# Patient Record
Sex: MALE | Race: WHITE | NOT HISPANIC OR LATINO | URBAN - METROPOLITAN AREA
[De-identification: names, ages, dates, MRNs, and addresses within clinical notes are randomized per-mention and may not be internally consistent; named-entity substitution may affect disease eponyms.]

---

## 2018-01-01 ENCOUNTER — TRANSFERRED RECORDS (OUTPATIENT)
Dept: HEALTH INFORMATION MANAGEMENT | Facility: CLINIC | Age: 63
End: 2018-01-01

## 2018-01-01 ENCOUNTER — TELEPHONE (OUTPATIENT)
Dept: INTERVENTIONAL RADIOLOGY/VASCULAR | Facility: CLINIC | Age: 63
End: 2018-01-01

## 2018-01-01 ENCOUNTER — APPOINTMENT (OUTPATIENT)
Dept: MEDSURG UNIT | Facility: CLINIC | Age: 63
DRG: 907 | End: 2018-01-01
Attending: RADIOLOGY
Payer: COMMERCIAL

## 2018-01-01 ENCOUNTER — APPOINTMENT (OUTPATIENT)
Dept: GENERAL RADIOLOGY | Facility: CLINIC | Age: 63
DRG: 987 | End: 2018-01-01
Attending: SURGERY
Payer: COMMERCIAL

## 2018-01-01 ENCOUNTER — PRE VISIT (OUTPATIENT)
Dept: PULMONOLOGY | Facility: CLINIC | Age: 63
End: 2018-01-01

## 2018-01-01 ENCOUNTER — TELEPHONE (OUTPATIENT)
Dept: ONCOLOGY | Facility: CLINIC | Age: 63
End: 2018-01-01

## 2018-01-01 ENCOUNTER — DOCUMENTATION ONLY (OUTPATIENT)
Dept: ONCOLOGY | Facility: CLINIC | Age: 63
End: 2018-01-01

## 2018-01-01 ENCOUNTER — APPOINTMENT (OUTPATIENT)
Dept: GENERAL RADIOLOGY | Facility: CLINIC | Age: 63
DRG: 987 | End: 2018-01-01
Attending: PHYSICIAN ASSISTANT
Payer: COMMERCIAL

## 2018-01-01 ENCOUNTER — RADIANT APPOINTMENT (OUTPATIENT)
Dept: GENERAL RADIOLOGY | Facility: CLINIC | Age: 63
End: 2018-01-01
Payer: COMMERCIAL

## 2018-01-01 ENCOUNTER — TELEPHONE (OUTPATIENT)
Dept: FAMILY MEDICINE | Facility: OTHER | Age: 63
End: 2018-01-01

## 2018-01-01 ENCOUNTER — HOSPITAL ENCOUNTER (OUTPATIENT)
Dept: CT IMAGING | Facility: CLINIC | Age: 63
DRG: 907 | End: 2018-05-09
Attending: RADIOLOGY | Admitting: RADIOLOGY
Payer: COMMERCIAL

## 2018-01-01 ENCOUNTER — APPOINTMENT (OUTPATIENT)
Dept: INTERVENTIONAL RADIOLOGY/VASCULAR | Facility: CLINIC | Age: 63
End: 2018-01-01
Attending: RADIOLOGY
Payer: COMMERCIAL

## 2018-01-01 ENCOUNTER — CARE COORDINATION (OUTPATIENT)
Dept: CARE COORDINATION | Facility: CLINIC | Age: 63
End: 2018-01-01

## 2018-01-01 ENCOUNTER — DOCUMENTATION ONLY (OUTPATIENT)
Dept: CARE COORDINATION | Facility: CLINIC | Age: 63
End: 2018-01-01

## 2018-01-01 ENCOUNTER — APPOINTMENT (OUTPATIENT)
Dept: INTERVENTIONAL RADIOLOGY/VASCULAR | Facility: CLINIC | Age: 63
DRG: 987 | End: 2018-01-01
Attending: RADIOLOGY
Payer: COMMERCIAL

## 2018-01-01 ENCOUNTER — APPOINTMENT (OUTPATIENT)
Dept: PHYSICAL THERAPY | Facility: CLINIC | Age: 63
DRG: 987 | End: 2018-01-01
Attending: SURGERY
Payer: COMMERCIAL

## 2018-01-01 ENCOUNTER — RADIANT APPOINTMENT (OUTPATIENT)
Dept: CT IMAGING | Facility: CLINIC | Age: 63
End: 2018-01-01
Payer: COMMERCIAL

## 2018-01-01 ENCOUNTER — HOSPITAL ENCOUNTER (OUTPATIENT)
Facility: CLINIC | Age: 63
Discharge: HOME | End: 2018-04-03
Attending: RADIOLOGY | Admitting: RADIOLOGY
Payer: COMMERCIAL

## 2018-01-01 ENCOUNTER — APPOINTMENT (OUTPATIENT)
Dept: MEDSURG UNIT | Facility: CLINIC | Age: 63
End: 2018-01-01
Attending: RADIOLOGY
Payer: COMMERCIAL

## 2018-01-01 ENCOUNTER — HOSPITAL ENCOUNTER (INPATIENT)
Facility: CLINIC | Age: 63
LOS: 8 days | Discharge: HOME | DRG: 987 | End: 2018-02-05
Attending: SURGERY | Admitting: SURGERY
Payer: COMMERCIAL

## 2018-01-01 ENCOUNTER — APPOINTMENT (OUTPATIENT)
Dept: LAB | Facility: CLINIC | Age: 63
End: 2018-01-01
Payer: COMMERCIAL

## 2018-01-01 ENCOUNTER — TELEPHONE (OUTPATIENT)
Dept: FAMILY MEDICINE | Facility: CLINIC | Age: 63
End: 2018-01-01

## 2018-01-01 ENCOUNTER — APPOINTMENT (OUTPATIENT)
Dept: INTERVENTIONAL RADIOLOGY/VASCULAR | Facility: CLINIC | Age: 63
DRG: 907 | End: 2018-01-01
Attending: NURSE PRACTITIONER
Payer: COMMERCIAL

## 2018-01-01 ENCOUNTER — ONCOLOGY VISIT (OUTPATIENT)
Dept: ONCOLOGY | Facility: CLINIC | Age: 63
End: 2018-01-01
Attending: INTERNAL MEDICINE
Payer: COMMERCIAL

## 2018-01-01 ENCOUNTER — OFFICE VISIT (OUTPATIENT)
Dept: FAMILY MEDICINE | Facility: OTHER | Age: 63
End: 2018-01-01
Payer: COMMERCIAL

## 2018-01-01 ENCOUNTER — OFFICE VISIT (OUTPATIENT)
Dept: GASTROENTEROLOGY | Facility: CLINIC | Age: 63
End: 2018-01-01
Attending: INTERNAL MEDICINE
Payer: COMMERCIAL

## 2018-01-01 ENCOUNTER — HOSPITAL ENCOUNTER (OUTPATIENT)
Dept: MRI IMAGING | Facility: CLINIC | Age: 63
Discharge: HOME | End: 2018-04-25
Attending: RADIOLOGY | Admitting: RADIOLOGY
Payer: COMMERCIAL

## 2018-01-01 ENCOUNTER — APPOINTMENT (OUTPATIENT)
Dept: ULTRASOUND IMAGING | Facility: CLINIC | Age: 63
DRG: 907 | End: 2018-01-01
Attending: RADIOLOGY
Payer: COMMERCIAL

## 2018-01-01 ENCOUNTER — OFFICE VISIT (OUTPATIENT)
Dept: INTERVENTIONAL RADIOLOGY/VASCULAR | Facility: CLINIC | Age: 63
End: 2018-01-01
Payer: COMMERCIAL

## 2018-01-01 ENCOUNTER — HOSPITAL ENCOUNTER (OUTPATIENT)
Dept: CARDIOLOGY | Facility: CLINIC | Age: 63
Discharge: HOME | End: 2018-02-15
Attending: INTERNAL MEDICINE | Admitting: INTERNAL MEDICINE
Payer: COMMERCIAL

## 2018-01-01 ENCOUNTER — OFFICE VISIT (OUTPATIENT)
Dept: CARDIOLOGY | Facility: CLINIC | Age: 63
End: 2018-01-01
Attending: INTERNAL MEDICINE
Payer: COMMERCIAL

## 2018-01-01 ENCOUNTER — CARE COORDINATION (OUTPATIENT)
Dept: ONCOLOGY | Facility: CLINIC | Age: 63
End: 2018-01-01

## 2018-01-01 ENCOUNTER — ANESTHESIA (OUTPATIENT)
Dept: INTENSIVE CARE | Facility: CLINIC | Age: 63
DRG: 987 | End: 2018-01-01
Payer: COMMERCIAL

## 2018-01-01 ENCOUNTER — HOSPITAL ENCOUNTER (INPATIENT)
Facility: CLINIC | Age: 63
LOS: 5 days | Discharge: HOME | DRG: 907 | End: 2018-05-14
Attending: RADIOLOGY | Admitting: INTERNAL MEDICINE
Payer: COMMERCIAL

## 2018-01-01 ENCOUNTER — APPOINTMENT (OUTPATIENT)
Dept: INTERVENTIONAL RADIOLOGY/VASCULAR | Facility: CLINIC | Age: 63
DRG: 907 | End: 2018-01-01
Attending: RADIOLOGY PRACTITIONER ASSISTANT
Payer: COMMERCIAL

## 2018-01-01 ENCOUNTER — OFFICE VISIT (OUTPATIENT)
Dept: PULMONOLOGY | Facility: CLINIC | Age: 63
End: 2018-01-01
Attending: INTERNAL MEDICINE
Payer: COMMERCIAL

## 2018-01-01 ENCOUNTER — APPOINTMENT (OUTPATIENT)
Dept: OCCUPATIONAL THERAPY | Facility: CLINIC | Age: 63
DRG: 987 | End: 2018-01-01
Attending: SURGERY
Payer: COMMERCIAL

## 2018-01-01 ENCOUNTER — OFFICE VISIT (OUTPATIENT)
Dept: SURGERY | Facility: CLINIC | Age: 63
End: 2018-01-01
Attending: SURGERY
Payer: COMMERCIAL

## 2018-01-01 ENCOUNTER — APPOINTMENT (OUTPATIENT)
Dept: CT IMAGING | Facility: CLINIC | Age: 63
DRG: 907 | End: 2018-01-01
Attending: PHYSICIAN ASSISTANT
Payer: COMMERCIAL

## 2018-01-01 ENCOUNTER — ANESTHESIA EVENT (OUTPATIENT)
Dept: INTENSIVE CARE | Facility: CLINIC | Age: 63
DRG: 987 | End: 2018-01-01
Payer: COMMERCIAL

## 2018-01-01 ENCOUNTER — CARE COORDINATION (OUTPATIENT)
Dept: SURGERY | Facility: CLINIC | Age: 63
End: 2018-01-01

## 2018-01-01 ENCOUNTER — APPOINTMENT (OUTPATIENT)
Dept: CT IMAGING | Facility: CLINIC | Age: 63
DRG: 907 | End: 2018-01-01
Attending: RADIOLOGY PRACTITIONER ASSISTANT
Payer: COMMERCIAL

## 2018-01-01 VITALS
DIASTOLIC BLOOD PRESSURE: 62 MMHG | HEIGHT: 65 IN | RESPIRATION RATE: 24 BRPM | SYSTOLIC BLOOD PRESSURE: 114 MMHG | OXYGEN SATURATION: 91 % | TEMPERATURE: 98.1 F | WEIGHT: 146.61 LBS | BODY MASS INDEX: 24.43 KG/M2 | HEART RATE: 104 BPM

## 2018-01-01 VITALS
WEIGHT: 134.04 LBS | HEART RATE: 76 BPM | HEIGHT: 64 IN | DIASTOLIC BLOOD PRESSURE: 76 MMHG | BODY MASS INDEX: 22.88 KG/M2 | OXYGEN SATURATION: 93 % | RESPIRATION RATE: 20 BRPM | SYSTOLIC BLOOD PRESSURE: 142 MMHG

## 2018-01-01 VITALS
SYSTOLIC BLOOD PRESSURE: 131 MMHG | RESPIRATION RATE: 20 BRPM | HEART RATE: 68 BPM | DIASTOLIC BLOOD PRESSURE: 94 MMHG | OXYGEN SATURATION: 93 %

## 2018-01-01 VITALS
HEIGHT: 63 IN | TEMPERATURE: 97.7 F | RESPIRATION RATE: 18 BRPM | SYSTOLIC BLOOD PRESSURE: 149 MMHG | BODY MASS INDEX: 24.66 KG/M2 | WEIGHT: 139.2 LBS | OXYGEN SATURATION: 95 % | DIASTOLIC BLOOD PRESSURE: 87 MMHG | HEART RATE: 95 BPM

## 2018-01-01 VITALS
OXYGEN SATURATION: 95 % | TEMPERATURE: 98.5 F | SYSTOLIC BLOOD PRESSURE: 133 MMHG | WEIGHT: 147 LBS | BODY MASS INDEX: 26.05 KG/M2 | HEIGHT: 63 IN | HEART RATE: 95 BPM | RESPIRATION RATE: 20 BRPM | DIASTOLIC BLOOD PRESSURE: 82 MMHG

## 2018-01-01 VITALS — DIASTOLIC BLOOD PRESSURE: 83 MMHG | OXYGEN SATURATION: 93 % | SYSTOLIC BLOOD PRESSURE: 141 MMHG | HEART RATE: 87 BPM

## 2018-01-01 VITALS
SYSTOLIC BLOOD PRESSURE: 159 MMHG | WEIGHT: 136 LBS | TEMPERATURE: 98.5 F | HEIGHT: 64 IN | HEART RATE: 80 BPM | OXYGEN SATURATION: 92 % | DIASTOLIC BLOOD PRESSURE: 84 MMHG | BODY MASS INDEX: 23.22 KG/M2

## 2018-01-01 VITALS
SYSTOLIC BLOOD PRESSURE: 123 MMHG | OXYGEN SATURATION: 91 % | HEART RATE: 89 BPM | TEMPERATURE: 98.5 F | WEIGHT: 143.2 LBS | BODY MASS INDEX: 24.58 KG/M2 | RESPIRATION RATE: 18 BRPM | DIASTOLIC BLOOD PRESSURE: 83 MMHG

## 2018-01-01 VITALS
RESPIRATION RATE: 16 BRPM | WEIGHT: 135 LBS | SYSTOLIC BLOOD PRESSURE: 150 MMHG | BODY MASS INDEX: 23.16 KG/M2 | TEMPERATURE: 98.9 F | DIASTOLIC BLOOD PRESSURE: 94 MMHG | OXYGEN SATURATION: 93 % | HEART RATE: 100 BPM

## 2018-01-01 VITALS
DIASTOLIC BLOOD PRESSURE: 66 MMHG | OXYGEN SATURATION: 93 % | HEART RATE: 103 BPM | SYSTOLIC BLOOD PRESSURE: 119 MMHG | HEIGHT: 65 IN

## 2018-01-01 VITALS
HEART RATE: 96 BPM | BODY MASS INDEX: 24.65 KG/M2 | TEMPERATURE: 98.5 F | WEIGHT: 144.4 LBS | HEIGHT: 64 IN | SYSTOLIC BLOOD PRESSURE: 144 MMHG | OXYGEN SATURATION: 91 % | DIASTOLIC BLOOD PRESSURE: 77 MMHG

## 2018-01-01 DIAGNOSIS — Z01.810 PRE-OPERATIVE CARDIOVASCULAR EXAMINATION: Primary | ICD-10-CM

## 2018-01-01 DIAGNOSIS — J18.9 COMMUNITY ACQUIRED PNEUMONIA, UNSPECIFIED LATERALITY: ICD-10-CM

## 2018-01-01 DIAGNOSIS — K76.9 LIVER LESION: Primary | ICD-10-CM

## 2018-01-01 DIAGNOSIS — G93.40 ENCEPHALOPATHY: ICD-10-CM

## 2018-01-01 DIAGNOSIS — R16.0 LIVER MASS: Primary | ICD-10-CM

## 2018-01-01 DIAGNOSIS — J44.9 CHRONIC OBSTRUCTIVE PULMONARY DISEASE, UNSPECIFIED COPD TYPE (H): ICD-10-CM

## 2018-01-01 DIAGNOSIS — C22.0 HCC (HEPATOCELLULAR CARCINOMA) (H): ICD-10-CM

## 2018-01-01 DIAGNOSIS — Z01.810 PRE-OPERATIVE CARDIOVASCULAR EXAMINATION: ICD-10-CM

## 2018-01-01 DIAGNOSIS — R16.0 LIVER MASS: ICD-10-CM

## 2018-01-01 DIAGNOSIS — C22.0 HCC (HEPATOCELLULAR CARCINOMA) (H): Primary | ICD-10-CM

## 2018-01-01 DIAGNOSIS — R06.02 SHORTNESS OF BREATH: ICD-10-CM

## 2018-01-01 DIAGNOSIS — K59.00 CONSTIPATION, UNSPECIFIED CONSTIPATION TYPE: ICD-10-CM

## 2018-01-01 DIAGNOSIS — J30.2 ACUTE SEASONAL ALLERGIC RHINITIS, UNSPECIFIED TRIGGER: ICD-10-CM

## 2018-01-01 DIAGNOSIS — B19.20 HEPATITIS C VIRUS INFECTION, UNSPECIFIED CHRONICITY: Primary | ICD-10-CM

## 2018-01-01 DIAGNOSIS — F10.10 ALCOHOL ABUSE: ICD-10-CM

## 2018-01-01 DIAGNOSIS — M54.6 ACUTE MIDLINE THORACIC BACK PAIN: ICD-10-CM

## 2018-01-01 DIAGNOSIS — Z01.818 PRE-OPERATIVE EXAMINATION: ICD-10-CM

## 2018-01-01 DIAGNOSIS — J44.1 COPD EXACERBATION (H): Primary | ICD-10-CM

## 2018-01-01 DIAGNOSIS — J44.1 COPD EXACERBATION (H): ICD-10-CM

## 2018-01-01 DIAGNOSIS — C78.7 SECONDARY MALIGNANT NEOPLASM OF LIVER (H): Primary | ICD-10-CM

## 2018-01-01 DIAGNOSIS — B18.2 CHRONIC HEPATITIS C WITHOUT HEPATIC COMA (H): ICD-10-CM

## 2018-01-01 DIAGNOSIS — C22.8: Primary | ICD-10-CM

## 2018-01-01 DIAGNOSIS — J44.9 COPD (CHRONIC OBSTRUCTIVE PULMONARY DISEASE) (H): ICD-10-CM

## 2018-01-01 DIAGNOSIS — Z01.818 PRE-OPERATIVE EXAMINATION: Primary | ICD-10-CM

## 2018-01-01 DIAGNOSIS — R06.02 SHORTNESS OF BREATH: Primary | ICD-10-CM

## 2018-01-01 DIAGNOSIS — C79.9: Primary | ICD-10-CM

## 2018-01-01 DIAGNOSIS — K76.9 LIVER LESION: ICD-10-CM

## 2018-01-01 LAB
A1AT PHENOTYP SERPL-IMP: ABNORMAL
A1AT SERPL-MCNC: 328 MG/DL (ref 90–200)
A1AT SS SERPL-MCNC: NEGATIVE
A1AT SZ SERPL-MCNC: ABNORMAL
A1AT ZZ SERPL-MCNC: NEGATIVE
ABO + RH BLD: NORMAL
AFP SERPL-MCNC: 2.8 UG/L (ref 0–8)
AFP SERPL-MCNC: 9.1 UG/L (ref 0–8)
ALBUMIN SERPL-MCNC: 2 G/DL (ref 3.4–5)
ALBUMIN SERPL-MCNC: 2.1 G/DL (ref 3.4–5)
ALBUMIN SERPL-MCNC: 2.4 G/DL (ref 3.4–5)
ALBUMIN SERPL-MCNC: 2.5 G/DL (ref 3.4–5)
ALBUMIN SERPL-MCNC: 2.7 G/DL (ref 3.4–5)
ALBUMIN SERPL-MCNC: 2.7 G/DL (ref 3.4–5)
ALBUMIN SERPL-MCNC: 2.8 G/DL (ref 3.4–5)
ALBUMIN SERPL-MCNC: 3.1 G/DL (ref 3.4–5)
ALBUMIN SERPL-MCNC: 3.3 G/DL (ref 3.4–5)
ALBUMIN SERPL-MCNC: 3.6 G/DL (ref 3.4–5)
ALP SERPL-CCNC: 100 U/L (ref 40–150)
ALP SERPL-CCNC: 100 U/L (ref 40–150)
ALP SERPL-CCNC: 102 U/L (ref 40–150)
ALP SERPL-CCNC: 111 U/L (ref 40–150)
ALP SERPL-CCNC: 112 U/L (ref 40–150)
ALP SERPL-CCNC: 115 U/L (ref 40–150)
ALP SERPL-CCNC: 120 U/L (ref 40–150)
ALP SERPL-CCNC: 126 U/L (ref 40–150)
ALP SERPL-CCNC: 176 U/L (ref 40–150)
ALP SERPL-CCNC: 96 U/L (ref 40–150)
ALT SERPL W P-5'-P-CCNC: 121 U/L (ref 0–70)
ALT SERPL W P-5'-P-CCNC: 42 U/L (ref 0–70)
ALT SERPL W P-5'-P-CCNC: 51 U/L (ref 0–70)
ALT SERPL W P-5'-P-CCNC: 59 U/L (ref 0–70)
ALT SERPL W P-5'-P-CCNC: 63 U/L (ref 0–70)
ALT SERPL W P-5'-P-CCNC: 69 U/L (ref 0–70)
ALT SERPL W P-5'-P-CCNC: 72 U/L (ref 0–70)
ALT SERPL W P-5'-P-CCNC: 73 U/L (ref 0–70)
ALT SERPL W P-5'-P-CCNC: 89 U/L (ref 0–70)
ALT SERPL W P-5'-P-CCNC: 92 U/L (ref 0–70)
AMMONIA PLAS-SCNC: 15 UMOL/L (ref 10–50)
ANGLE RATE OF CLOT STRENGTH: 73.2 DEGREES (ref 53–72)
ANGLE RATE OF CLOT STRENGTH: NORMAL DEGREES (ref 53–72)
ANION GAP SERPL CALCULATED.3IONS-SCNC: 10 MMOL/L (ref 3–14)
ANION GAP SERPL CALCULATED.3IONS-SCNC: 5 MMOL/L (ref 3–14)
ANION GAP SERPL CALCULATED.3IONS-SCNC: 6 MMOL/L (ref 3–14)
ANION GAP SERPL CALCULATED.3IONS-SCNC: 7 MMOL/L (ref 3–14)
ANION GAP SERPL CALCULATED.3IONS-SCNC: 8 MMOL/L (ref 3–14)
APTT PPP: 28 SEC (ref 22–37)
APTT PPP: 32 SEC (ref 22–37)
AST SERPL W P-5'-P-CCNC: 151 U/L (ref 0–45)
AST SERPL W P-5'-P-CCNC: 182 U/L (ref 0–45)
AST SERPL W P-5'-P-CCNC: 183 U/L (ref 0–45)
AST SERPL W P-5'-P-CCNC: 216 U/L (ref 0–45)
AST SERPL W P-5'-P-CCNC: 261 U/L (ref 0–45)
AST SERPL W P-5'-P-CCNC: 261 U/L (ref 0–45)
AST SERPL W P-5'-P-CCNC: 418 U/L (ref 0–45)
AST SERPL W P-5'-P-CCNC: 47 U/L (ref 0–45)
AST SERPL W P-5'-P-CCNC: 526 U/L (ref 0–45)
AST SERPL W P-5'-P-CCNC: ABNORMAL U/L (ref 0–45)
BACTERIA SPEC CULT: NORMAL
BASE DEFICIT BLDA-SCNC: 1.2 MMOL/L
BASE DEFICIT BLDV-SCNC: 1.4 MMOL/L
BASE EXCESS BLDA CALC-SCNC: 1.5 MMOL/L
BASE EXCESS BLDA CALC-SCNC: 2.8 MMOL/L
BASE EXCESS BLDA CALC-SCNC: 3.1 MMOL/L
BASE EXCESS BLDA CALC-SCNC: 3.8 MMOL/L
BASE EXCESS BLDV CALC-SCNC: 2.5 MMOL/L
BASE EXCESS BLDV CALC-SCNC: 3.1 MMOL/L
BASOPHILS # BLD AUTO: 0.1 10E9/L (ref 0–0.2)
BASOPHILS # BLD AUTO: 0.1 10E9/L (ref 0–0.2)
BASOPHILS NFR BLD AUTO: 0.7 %
BASOPHILS NFR BLD AUTO: 0.9 %
BILIRUB DIRECT SERPL-MCNC: 0.4 MG/DL (ref 0–0.2)
BILIRUB SERPL-MCNC: 0.6 MG/DL (ref 0.2–1.3)
BILIRUB SERPL-MCNC: 0.7 MG/DL (ref 0.2–1.3)
BILIRUB SERPL-MCNC: 1.2 MG/DL (ref 0.2–1.3)
BILIRUB SERPL-MCNC: 1.2 MG/DL (ref 0.2–1.3)
BILIRUB SERPL-MCNC: 1.4 MG/DL (ref 0.2–1.3)
BILIRUB SERPL-MCNC: 1.6 MG/DL (ref 0.2–1.3)
BILIRUB SERPL-MCNC: 1.8 MG/DL (ref 0.2–1.3)
BILIRUB SERPL-MCNC: 2.3 MG/DL (ref 0.2–1.3)
BLD GP AB SCN SERPL QL: NORMAL
BLD PROD TYP BPU: NORMAL
BLD UNIT ID BPU: 0
BLOOD BANK CMNT PATIENT-IMP: NORMAL
BLOOD PRODUCT CODE: NORMAL
BPU ID: NORMAL
BUN SERPL-MCNC: 10 MG/DL (ref 7–30)
BUN SERPL-MCNC: 12 MG/DL (ref 7–30)
BUN SERPL-MCNC: 14 MG/DL (ref 7–30)
BUN SERPL-MCNC: 15 MG/DL (ref 7–30)
BUN SERPL-MCNC: 16 MG/DL (ref 7–30)
BUN SERPL-MCNC: 18 MG/DL (ref 7–30)
BUN SERPL-MCNC: 18 MG/DL (ref 7–30)
BUN SERPL-MCNC: 19 MG/DL (ref 7–30)
BUN SERPL-MCNC: 20 MG/DL (ref 7–30)
BUN SERPL-MCNC: 21 MG/DL (ref 7–30)
BUN SERPL-MCNC: 22 MG/DL (ref 7–30)
BUN SERPL-MCNC: 23 MG/DL (ref 7–30)
BUN SERPL-MCNC: 24 MG/DL (ref 7–30)
BUN SERPL-MCNC: 24 MG/DL (ref 7–30)
BUN SERPL-MCNC: 25 MG/DL (ref 7–30)
BUN SERPL-MCNC: 28 MG/DL (ref 7–30)
BUN SERPL-MCNC: 7 MG/DL (ref 7–30)
BUN SERPL-MCNC: 9 MG/DL (ref 7–30)
CA-I BLD-SCNC: 4.6 MG/DL (ref 4.4–5.2)
CALCIUM SERPL-MCNC: 7.6 MG/DL (ref 8.5–10.1)
CALCIUM SERPL-MCNC: 7.7 MG/DL (ref 8.5–10.1)
CALCIUM SERPL-MCNC: 7.8 MG/DL (ref 8.5–10.1)
CALCIUM SERPL-MCNC: 7.8 MG/DL (ref 8.5–10.1)
CALCIUM SERPL-MCNC: 7.9 MG/DL (ref 8.5–10.1)
CALCIUM SERPL-MCNC: 8 MG/DL (ref 8.5–10.1)
CALCIUM SERPL-MCNC: 8.1 MG/DL (ref 8.5–10.1)
CALCIUM SERPL-MCNC: 8.2 MG/DL (ref 8.5–10.1)
CALCIUM SERPL-MCNC: 8.2 MG/DL (ref 8.5–10.1)
CALCIUM SERPL-MCNC: 8.4 MG/DL (ref 8.5–10.1)
CALCIUM SERPL-MCNC: 9 MG/DL (ref 8.5–10.1)
CALCIUM SERPL-MCNC: 9.7 MG/DL (ref 8.5–10.1)
CANCER AG19-9 SERPL-ACNC: 18 U/ML (ref 0–37)
CEA SERPL-MCNC: 2.8 UG/L (ref 0–2.5)
CHLORIDE SERPL-SCNC: 102 MMOL/L (ref 94–109)
CHLORIDE SERPL-SCNC: 103 MMOL/L (ref 94–109)
CHLORIDE SERPL-SCNC: 104 MMOL/L (ref 94–109)
CHLORIDE SERPL-SCNC: 105 MMOL/L (ref 94–109)
CHLORIDE SERPL-SCNC: 106 MMOL/L (ref 94–109)
CHLORIDE SERPL-SCNC: 107 MMOL/L (ref 94–109)
CHLORIDE SERPL-SCNC: 107 MMOL/L (ref 94–109)
CHLORIDE SERPL-SCNC: 108 MMOL/L (ref 94–109)
CHLORIDE SERPL-SCNC: 97 MMOL/L (ref 94–109)
CHLORIDE SERPL-SCNC: 99 MMOL/L (ref 94–109)
CI HYPERCOAGULATION INDEX: 3.3 RATIO (ref 0–3)
CI HYPERCOAGULATION INDEX: NORMAL RATIO (ref 0–3)
CI HYPOCOAGULATION INDEX: NORMAL RATIO (ref 0–3)
CO2 BLD-SCNC: 26 MMOL/L (ref 21–28)
CO2 SERPL-SCNC: 20 MMOL/L (ref 20–32)
CO2 SERPL-SCNC: 25 MMOL/L (ref 20–32)
CO2 SERPL-SCNC: 25 MMOL/L (ref 20–32)
CO2 SERPL-SCNC: 27 MMOL/L (ref 20–32)
CO2 SERPL-SCNC: 27 MMOL/L (ref 20–32)
CO2 SERPL-SCNC: 28 MMOL/L (ref 20–32)
CO2 SERPL-SCNC: 29 MMOL/L (ref 20–32)
CO2 SERPL-SCNC: 30 MMOL/L (ref 20–32)
CO2 SERPL-SCNC: 31 MMOL/L (ref 20–32)
CO2 SERPL-SCNC: 31 MMOL/L (ref 20–32)
CO2 SERPL-SCNC: 33 MMOL/L (ref 20–32)
COPATH REPORT: NORMAL
CREAT SERPL-MCNC: 0.49 MG/DL (ref 0.66–1.25)
CREAT SERPL-MCNC: 0.53 MG/DL (ref 0.66–1.25)
CREAT SERPL-MCNC: 0.53 MG/DL (ref 0.66–1.25)
CREAT SERPL-MCNC: 0.54 MG/DL (ref 0.66–1.25)
CREAT SERPL-MCNC: 0.57 MG/DL (ref 0.66–1.25)
CREAT SERPL-MCNC: 0.67 MG/DL (ref 0.66–1.25)
CREAT SERPL-MCNC: 0.68 MG/DL (ref 0.66–1.25)
CREAT SERPL-MCNC: 0.69 MG/DL (ref 0.66–1.25)
CREAT SERPL-MCNC: 0.71 MG/DL (ref 0.66–1.25)
CREAT SERPL-MCNC: 0.75 MG/DL (ref 0.66–1.25)
CREAT SERPL-MCNC: 0.77 MG/DL (ref 0.66–1.25)
CREAT SERPL-MCNC: 0.77 MG/DL (ref 0.66–1.25)
CREAT SERPL-MCNC: 0.85 MG/DL (ref 0.66–1.25)
CREAT SERPL-MCNC: 0.86 MG/DL (ref 0.66–1.25)
CREAT SERPL-MCNC: 0.94 MG/DL (ref 0.66–1.25)
CREAT SERPL-MCNC: 1.11 MG/DL (ref 0.66–1.25)
CRP SERPL-MCNC: 65 MG/L (ref 0–8)
DIFFERENTIAL METHOD BLD: ABNORMAL
DIFFERENTIAL METHOD BLD: ABNORMAL
DLCOCOR-%PRED-PRE: 47 %
DLCOCOR-PRE: 12.16 ML/MIN/MMHG
DLCOUNC-%PRED-PRE: 38 %
DLCOUNC-PRE: 10.01 ML/MIN/MMHG
DLCOUNC-PRED: 25.69 ML/MIN/MMHG
EOSINOPHIL # BLD AUTO: 0.3 10E9/L (ref 0–0.7)
EOSINOPHIL # BLD AUTO: 0.3 10E9/L (ref 0–0.7)
EOSINOPHIL NFR BLD AUTO: 2.7 %
EOSINOPHIL NFR BLD AUTO: 3.5 %
ERV-%PRED-PRE: 36 %
ERV-PRE: 0.36 L
ERV-PRED: 0.98 L
ERYTHROCYTE [DISTWIDTH] IN BLOOD BY AUTOMATED COUNT: 13.7 % (ref 10–15)
ERYTHROCYTE [DISTWIDTH] IN BLOOD BY AUTOMATED COUNT: 13.8 % (ref 10–15)
ERYTHROCYTE [DISTWIDTH] IN BLOOD BY AUTOMATED COUNT: 14.3 % (ref 10–15)
ERYTHROCYTE [DISTWIDTH] IN BLOOD BY AUTOMATED COUNT: 14.5 % (ref 10–15)
ERYTHROCYTE [DISTWIDTH] IN BLOOD BY AUTOMATED COUNT: 15.1 % (ref 10–15)
ERYTHROCYTE [DISTWIDTH] IN BLOOD BY AUTOMATED COUNT: 15.3 % (ref 10–15)
ERYTHROCYTE [DISTWIDTH] IN BLOOD BY AUTOMATED COUNT: 15.5 % (ref 10–15)
ERYTHROCYTE [DISTWIDTH] IN BLOOD BY AUTOMATED COUNT: 15.7 % (ref 10–15)
ERYTHROCYTE [DISTWIDTH] IN BLOOD BY AUTOMATED COUNT: 15.8 % (ref 10–15)
ERYTHROCYTE [DISTWIDTH] IN BLOOD BY AUTOMATED COUNT: 15.8 % (ref 10–15)
ERYTHROCYTE [DISTWIDTH] IN BLOOD BY AUTOMATED COUNT: 15.9 % (ref 10–15)
ERYTHROCYTE [DISTWIDTH] IN BLOOD BY AUTOMATED COUNT: 15.9 % (ref 10–15)
ERYTHROCYTE [DISTWIDTH] IN BLOOD BY AUTOMATED COUNT: 16.1 % (ref 10–15)
ERYTHROCYTE [DISTWIDTH] IN BLOOD BY AUTOMATED COUNT: 16.2 % (ref 10–15)
ERYTHROCYTE [DISTWIDTH] IN BLOOD BY AUTOMATED COUNT: 17 % (ref 10–15)
ERYTHROCYTE [DISTWIDTH] IN BLOOD BY AUTOMATED COUNT: 17 % (ref 10–15)
EXPTIME-PRE: 12.25 SEC
FEF2575-%PRED-POST: 11 %
FEF2575-%PRED-PRE: 6 %
FEF2575-POST: 0.28 L/SEC
FEF2575-PRE: 0.15 L/SEC
FEF2575-PRED: 2.51 L/SEC
FEFMAX-%PRED-PRE: 23 %
FEFMAX-PRE: 1.86 L/SEC
FEFMAX-PRED: 7.89 L/SEC
FEV1-%PRED-PRE: 17 %
FEV1-PRE: 0.5 L
FEV1FEV6-PRE: 45 %
FEV1FEV6-PRED: 79 %
FEV1FVC-PRE: 38 %
FEV1FVC-PRED: 78 %
FEV1SVC-PRE: 36 %
FEV1SVC-PRED: 74 %
FIBRINOGEN PPP-MCNC: 631 MG/DL (ref 200–420)
FIFMAX-PRE: 1.87 L/SEC
FOLATE SERPL-MCNC: 19.6 NG/ML
FRCPLETH-%PRED-PRE: 182 %
FRCPLETH-PRE: 6.01 L
FRCPLETH-PRED: 3.3 L
FVC-%PRED-PRE: 35 %
FVC-PRE: 1.32 L
FVC-PRED: 3.75 L
G ACTUAL CLOT STRENGTH: 14.8 KD/SC (ref 4.5–11)
G ACTUAL CLOT STRENGTH: NORMAL KD/SC (ref 4.5–11)
GFR SERPL CREATININE-BSD FRML MDRD: 67 ML/MIN/1.7M2
GFR SERPL CREATININE-BSD FRML MDRD: 81 ML/MIN/1.7M2
GFR SERPL CREATININE-BSD FRML MDRD: >90 ML/MIN/1.7M2
GLUCOSE BLD-MCNC: 87 MG/DL (ref 70–99)
GLUCOSE BLDC GLUCOMTR-MCNC: 130 MG/DL (ref 70–99)
GLUCOSE BLDC GLUCOMTR-MCNC: 135 MG/DL (ref 70–99)
GLUCOSE BLDC GLUCOMTR-MCNC: 141 MG/DL (ref 70–99)
GLUCOSE BLDC GLUCOMTR-MCNC: 146 MG/DL (ref 70–99)
GLUCOSE BLDC GLUCOMTR-MCNC: 146 MG/DL (ref 70–99)
GLUCOSE BLDC GLUCOMTR-MCNC: 156 MG/DL (ref 70–99)
GLUCOSE BLDC GLUCOMTR-MCNC: 156 MG/DL (ref 70–99)
GLUCOSE BLDC GLUCOMTR-MCNC: 178 MG/DL (ref 70–99)
GLUCOSE BLDC GLUCOMTR-MCNC: 208 MG/DL (ref 70–99)
GLUCOSE SERPL-MCNC: 100 MG/DL (ref 70–99)
GLUCOSE SERPL-MCNC: 101 MG/DL (ref 70–99)
GLUCOSE SERPL-MCNC: 105 MG/DL (ref 70–99)
GLUCOSE SERPL-MCNC: 108 MG/DL (ref 70–99)
GLUCOSE SERPL-MCNC: 113 MG/DL (ref 70–99)
GLUCOSE SERPL-MCNC: 115 MG/DL (ref 70–99)
GLUCOSE SERPL-MCNC: 122 MG/DL (ref 70–99)
GLUCOSE SERPL-MCNC: 126 MG/DL (ref 70–99)
GLUCOSE SERPL-MCNC: 128 MG/DL (ref 70–99)
GLUCOSE SERPL-MCNC: 136 MG/DL (ref 70–99)
GLUCOSE SERPL-MCNC: 142 MG/DL (ref 70–99)
GLUCOSE SERPL-MCNC: 156 MG/DL (ref 70–99)
GLUCOSE SERPL-MCNC: 227 MG/DL (ref 70–99)
GLUCOSE SERPL-MCNC: 87 MG/DL (ref 70–99)
GLUCOSE SERPL-MCNC: 95 MG/DL (ref 70–99)
GLUCOSE SERPL-MCNC: 95 MG/DL (ref 70–99)
GLUCOSE SERPL-MCNC: 96 MG/DL (ref 70–99)
GLUCOSE SERPL-MCNC: 96 MG/DL (ref 70–99)
HBV SURFACE AB SERPL IA-ACNC: 7.48 M[IU]/ML
HBV SURFACE AG SERPL QL IA: NONREACTIVE
HCO3 BLD-SCNC: 28 MMOL/L (ref 21–28)
HCO3 BLD-SCNC: 28 MMOL/L (ref 21–28)
HCO3 BLD-SCNC: 29 MMOL/L (ref 21–28)
HCO3 BLD-SCNC: 29 MMOL/L (ref 21–28)
HCO3 BLD-SCNC: 30 MMOL/L (ref 21–28)
HCO3 BLDV-SCNC: 27 MMOL/L (ref 21–28)
HCO3 BLDV-SCNC: 29 MMOL/L (ref 21–28)
HCO3 BLDV-SCNC: 29 MMOL/L (ref 21–28)
HCT VFR BLD AUTO: 20.8 % (ref 40–53)
HCT VFR BLD AUTO: 21.6 % (ref 40–53)
HCT VFR BLD AUTO: 25.6 % (ref 40–53)
HCT VFR BLD AUTO: 25.8 % (ref 40–53)
HCT VFR BLD AUTO: 26.4 % (ref 40–53)
HCT VFR BLD AUTO: 26.6 % (ref 40–53)
HCT VFR BLD AUTO: 27.7 % (ref 40–53)
HCT VFR BLD AUTO: 27.9 % (ref 40–53)
HCT VFR BLD AUTO: 29.4 % (ref 40–53)
HCT VFR BLD AUTO: 29.6 % (ref 40–53)
HCT VFR BLD AUTO: 29.7 % (ref 40–53)
HCT VFR BLD AUTO: 31.7 % (ref 40–53)
HCT VFR BLD AUTO: 33.5 % (ref 40–53)
HCT VFR BLD AUTO: 49 % (ref 40–53)
HCT VFR BLD AUTO: 49.3 % (ref 40–53)
HCT VFR BLD AUTO: 51.9 % (ref 40–53)
HCT VFR BLD CALC: 42 %PCV (ref 40–53)
HCV AB SERPL QL IA: REACTIVE
HGB BLD CALC-MCNC: 14.3 G/DL (ref 13.3–17.7)
HGB BLD-MCNC: 10.7 G/DL (ref 13.3–17.7)
HGB BLD-MCNC: 11.7 G/DL (ref 13.3–17.7)
HGB BLD-MCNC: 13.1 G/DL (ref 13.3–17.7)
HGB BLD-MCNC: 13.9 G/DL (ref 13.3–17.7)
HGB BLD-MCNC: 15.4 G/DL (ref 13.3–17.7)
HGB BLD-MCNC: 15.9 G/DL (ref 13.3–17.7)
HGB BLD-MCNC: 16.6 G/DL (ref 13.3–17.7)
HGB BLD-MCNC: 6.4 G/DL (ref 13.3–17.7)
HGB BLD-MCNC: 6.5 G/DL (ref 13.3–17.7)
HGB BLD-MCNC: 6.8 G/DL (ref 13.3–17.7)
HGB BLD-MCNC: 7.2 G/DL (ref 13.3–17.7)
HGB BLD-MCNC: 7.2 G/DL (ref 13.3–17.7)
HGB BLD-MCNC: 7.3 G/DL (ref 13.3–17.7)
HGB BLD-MCNC: 7.4 G/DL (ref 13.3–17.7)
HGB BLD-MCNC: 7.5 G/DL (ref 13.3–17.7)
HGB BLD-MCNC: 7.5 G/DL (ref 13.3–17.7)
HGB BLD-MCNC: 8 G/DL (ref 13.3–17.7)
HGB BLD-MCNC: 8.1 G/DL (ref 13.3–17.7)
HGB BLD-MCNC: 8.2 G/DL (ref 13.3–17.7)
HGB BLD-MCNC: 8.3 G/DL (ref 13.3–17.7)
HGB BLD-MCNC: 8.4 G/DL (ref 13.3–17.7)
HGB BLD-MCNC: 8.5 G/DL (ref 13.3–17.7)
HGB BLD-MCNC: 8.6 G/DL (ref 13.3–17.7)
HGB BLD-MCNC: 8.7 G/DL (ref 13.3–17.7)
HGB BLD-MCNC: 8.8 G/DL (ref 13.3–17.7)
HGB BLD-MCNC: 8.9 G/DL (ref 13.3–17.7)
HGB BLD-MCNC: 9 G/DL (ref 13.3–17.7)
HGB BLD-MCNC: 9 G/DL (ref 13.3–17.7)
HGB BLD-MCNC: 9.2 G/DL (ref 13.3–17.7)
HGB BLD-MCNC: 9.3 G/DL (ref 13.3–17.7)
HGB BLD-MCNC: 9.6 G/DL (ref 13.3–17.7)
HGB BLD-MCNC: 9.9 G/DL (ref 13.3–17.7)
IC-%PRED-PRE: 34 %
IC-PRE: 1.03 L
IC-PRED: 3 L
IMM GRANULOCYTES # BLD: 0 10E9/L (ref 0–0.4)
IMM GRANULOCYTES # BLD: 0.1 10E9/L (ref 0–0.4)
IMM GRANULOCYTES NFR BLD: 0.3 %
IMM GRANULOCYTES NFR BLD: 0.5 %
INR PPP: 0.89 (ref 0.86–1.14)
INR PPP: 0.97 (ref 0.86–1.14)
INR PPP: 0.98 (ref 0.86–1.14)
INR PPP: 0.98 (ref 0.86–1.14)
INR PPP: 1.01 (ref 0.86–1.14)
INR PPP: 1.15 (ref 0.86–1.14)
INTERPRETATION ECG - MUSE: NORMAL
INTERPRETATION ECG - MUSE: NORMAL
K TIME TO SPEC CLOT STRENGTH: 1.2 MINUTE (ref 1–3)
K TIME TO SPEC CLOT STRENGTH: NORMAL MINUTE (ref 1–3)
LACTATE BLD-SCNC: 0.4 MMOL/L (ref 0.4–1.9)
LACTATE BLD-SCNC: 0.4 MMOL/L (ref 0.4–1.9)
LACTATE BLD-SCNC: 0.7 MMOL/L (ref 0.4–1.9)
LACTATE BLD-SCNC: 0.8 MMOL/L (ref 0.7–2)
LACTATE BLD-SCNC: 1.1 MMOL/L (ref 0.4–1.9)
LACTATE BLD-SCNC: 1.4 MMOL/L (ref 0.4–1.9)
LACTATE BLD-SCNC: 2.1 MMOL/L (ref 0.7–2)
LY30 LYSIS AT 30 MINUTES: 0.6 % (ref 0–8)
LY30 LYSIS AT 30 MINUTES: NORMAL % (ref 0–8)
LY60 LYSIS AT 60 MINUTES: 3.2 % (ref 0–15)
LY60 LYSIS AT 60 MINUTES: NORMAL % (ref 0–15)
LYMPHOCYTES # BLD AUTO: 1.7 10E9/L (ref 0.8–5.3)
LYMPHOCYTES # BLD AUTO: 2.3 10E9/L (ref 0.8–5.3)
LYMPHOCYTES NFR BLD AUTO: 17.8 %
LYMPHOCYTES NFR BLD AUTO: 23.3 %
MA MAXIMUM CLOT STRENGTH: 74.8 MM (ref 50–70)
MA MAXIMUM CLOT STRENGTH: NORMAL MM (ref 50–70)
MAGNESIUM SERPL-MCNC: 2 MG/DL (ref 1.6–2.3)
MAGNESIUM SERPL-MCNC: 2 MG/DL (ref 1.6–2.3)
MAGNESIUM SERPL-MCNC: 2.1 MG/DL (ref 1.6–2.3)
MAGNESIUM SERPL-MCNC: 2.2 MG/DL (ref 1.6–2.3)
MAGNESIUM SERPL-MCNC: 2.3 MG/DL (ref 1.6–2.3)
MAGNESIUM SERPL-MCNC: 2.4 MG/DL (ref 1.6–2.3)
MCH RBC QN AUTO: 29.1 PG (ref 26.5–33)
MCH RBC QN AUTO: 29.3 PG (ref 26.5–33)
MCH RBC QN AUTO: 29.4 PG (ref 26.5–33)
MCH RBC QN AUTO: 29.5 PG (ref 26.5–33)
MCH RBC QN AUTO: 29.6 PG (ref 26.5–33)
MCH RBC QN AUTO: 29.7 PG (ref 26.5–33)
MCH RBC QN AUTO: 29.8 PG (ref 26.5–33)
MCH RBC QN AUTO: 29.9 PG (ref 26.5–33)
MCH RBC QN AUTO: 30.1 PG (ref 26.5–33)
MCH RBC QN AUTO: 30.4 PG (ref 26.5–33)
MCH RBC QN AUTO: 30.9 PG (ref 26.5–33)
MCH RBC QN AUTO: 31.1 PG (ref 26.5–33)
MCHC RBC AUTO-ENTMCNC: 29.7 G/DL (ref 31.5–36.5)
MCHC RBC AUTO-ENTMCNC: 30.3 G/DL (ref 31.5–36.5)
MCHC RBC AUTO-ENTMCNC: 30.5 G/DL (ref 31.5–36.5)
MCHC RBC AUTO-ENTMCNC: 30.7 G/DL (ref 31.5–36.5)
MCHC RBC AUTO-ENTMCNC: 31 G/DL (ref 31.5–36.5)
MCHC RBC AUTO-ENTMCNC: 31.2 G/DL (ref 31.5–36.5)
MCHC RBC AUTO-ENTMCNC: 31.3 G/DL (ref 31.5–36.5)
MCHC RBC AUTO-ENTMCNC: 31.3 G/DL (ref 31.5–36.5)
MCHC RBC AUTO-ENTMCNC: 31.5 G/DL (ref 31.5–36.5)
MCHC RBC AUTO-ENTMCNC: 31.6 G/DL (ref 31.5–36.5)
MCHC RBC AUTO-ENTMCNC: 31.8 G/DL (ref 31.5–36.5)
MCHC RBC AUTO-ENTMCNC: 31.9 G/DL (ref 31.5–36.5)
MCHC RBC AUTO-ENTMCNC: 32 G/DL (ref 31.5–36.5)
MCHC RBC AUTO-ENTMCNC: 32.4 G/DL (ref 31.5–36.5)
MCV RBC AUTO: 100 FL (ref 78–100)
MCV RBC AUTO: 103 FL (ref 78–100)
MCV RBC AUTO: 93 FL (ref 78–100)
MCV RBC AUTO: 94 FL (ref 78–100)
MCV RBC AUTO: 95 FL (ref 78–100)
MCV RBC AUTO: 96 FL (ref 78–100)
MCV RBC AUTO: 96 FL (ref 78–100)
MCV RBC AUTO: 97 FL (ref 78–100)
MCV RBC AUTO: 98 FL (ref 78–100)
MONOCYTES # BLD AUTO: 0.9 10E9/L (ref 0–1.3)
MONOCYTES # BLD AUTO: 1.1 10E9/L (ref 0–1.3)
MONOCYTES NFR BLD AUTO: 11.2 %
MONOCYTES NFR BLD AUTO: 8.9 %
MRSA DNA SPEC QL NAA+PROBE: NEGATIVE
NEUTROPHILS # BLD AUTO: 6.3 10E9/L (ref 1.6–8.3)
NEUTROPHILS # BLD AUTO: 6.5 10E9/L (ref 1.6–8.3)
NEUTROPHILS NFR BLD AUTO: 63.9 %
NEUTROPHILS NFR BLD AUTO: 66.3 %
NRBC # BLD AUTO: 0 10*3/UL
NRBC # BLD AUTO: 0 10*3/UL
NRBC BLD AUTO-RTO: 0 /100
NRBC BLD AUTO-RTO: 0 /100
NT-PROBNP SERPL-MCNC: 1328 PG/ML (ref 0–900)
NUM BPU REQUESTED: 2
NUM BPU REQUESTED: 2
NUM BPU REQUESTED: 5
O2/TOTAL GAS SETTING VFR VENT: 40 %
O2/TOTAL GAS SETTING VFR VENT: 60 %
O2/TOTAL GAS SETTING VFR VENT: ABNORMAL %
OXYHGB MFR BLDV: 84 %
PCO2 BLD: 44 MM HG (ref 35–45)
PCO2 BLD: 53 MM HG (ref 35–45)
PCO2 BLD: 55 MM HG (ref 35–45)
PCO2 BLD: 57 MM HG (ref 35–45)
PCO2 BLD: 61 MM HG (ref 35–45)
PCO2 BLD: 83 MM HG (ref 35–45)
PCO2 BLDV: 53 MM HG (ref 40–50)
PCO2 BLDV: 58 MM HG (ref 40–50)
PCO2 BLDV: 77 MM HG (ref 40–50)
PH BLD: 7.14 PH (ref 7.35–7.45)
PH BLD: 7.26 PH (ref 7.35–7.45)
PH BLD: 7.29 PH (ref 7.35–7.45)
PH BLD: 7.32 PH (ref 7.35–7.45)
PH BLD: 7.35 PH (ref 7.35–7.45)
PH BLD: 7.42 PH (ref 7.35–7.45)
PH BLDV: 7.15 PH (ref 7.32–7.43)
PH BLDV: 7.31 PH (ref 7.32–7.43)
PH BLDV: 7.35 PH (ref 7.32–7.43)
PHOSPHATE SERPL-MCNC: 1.7 MG/DL (ref 2.5–4.5)
PHOSPHATE SERPL-MCNC: 2.6 MG/DL (ref 2.5–4.5)
PHOSPHATE SERPL-MCNC: 2.9 MG/DL (ref 2.5–4.5)
PHOSPHATE SERPL-MCNC: 3 MG/DL (ref 2.5–4.5)
PHOSPHATE SERPL-MCNC: 3 MG/DL (ref 2.5–4.5)
PHOSPHATE SERPL-MCNC: 3.2 MG/DL (ref 2.5–4.5)
PHOSPHATE SERPL-MCNC: 3.3 MG/DL (ref 2.5–4.5)
PHOSPHATE SERPL-MCNC: 3.3 MG/DL (ref 2.5–4.5)
PHOSPHATE SERPL-MCNC: 3.5 MG/DL (ref 2.5–4.5)
PHOSPHATE SERPL-MCNC: 5.3 MG/DL (ref 2.5–4.5)
PLATELET # BLD AUTO: 199 10E9/L (ref 150–450)
PLATELET # BLD AUTO: 201 10E9/L (ref 150–450)
PLATELET # BLD AUTO: 221 10E9/L (ref 150–450)
PLATELET # BLD AUTO: 231 10E9/L (ref 150–450)
PLATELET # BLD AUTO: 240 10E9/L (ref 150–450)
PLATELET # BLD AUTO: 244 10E9/L (ref 150–450)
PLATELET # BLD AUTO: 252 10E9/L (ref 150–450)
PLATELET # BLD AUTO: 253 10E9/L (ref 150–450)
PLATELET # BLD AUTO: 265 10E9/L (ref 150–450)
PLATELET # BLD AUTO: 282 10E9/L (ref 150–450)
PLATELET # BLD AUTO: 284 10E9/L (ref 150–450)
PLATELET # BLD AUTO: 286 10E9/L (ref 150–450)
PLATELET # BLD AUTO: 290 10E9/L (ref 150–450)
PLATELET # BLD AUTO: 300 10E9/L (ref 150–450)
PLATELET # BLD AUTO: 350 10E9/L (ref 150–450)
PLATELET # BLD AUTO: 356 10E9/L (ref 150–450)
PO2 BLD: 159 MM HG (ref 80–105)
PO2 BLD: 54 MM HG (ref 80–105)
PO2 BLD: 79 MM HG (ref 80–105)
PO2 BLD: 83 MM HG (ref 80–105)
PO2 BLD: 83 MM HG (ref 80–105)
PO2 BLD: 91 MM HG (ref 80–105)
PO2 BLDV: 38 MM HG (ref 25–47)
PO2 BLDV: 43 MM HG (ref 25–47)
PO2 BLDV: 59 MM HG (ref 25–47)
POTASSIUM BLD-SCNC: 4.3 MMOL/L (ref 3.4–5.3)
POTASSIUM SERPL-SCNC: 3.6 MMOL/L (ref 3.4–5.3)
POTASSIUM SERPL-SCNC: 3.7 MMOL/L (ref 3.4–5.3)
POTASSIUM SERPL-SCNC: 3.7 MMOL/L (ref 3.4–5.3)
POTASSIUM SERPL-SCNC: 3.9 MMOL/L (ref 3.4–5.3)
POTASSIUM SERPL-SCNC: 4 MMOL/L (ref 3.4–5.3)
POTASSIUM SERPL-SCNC: 4 MMOL/L (ref 3.4–5.3)
POTASSIUM SERPL-SCNC: 4.1 MMOL/L (ref 3.4–5.3)
POTASSIUM SERPL-SCNC: 4.1 MMOL/L (ref 3.4–5.3)
POTASSIUM SERPL-SCNC: 4.2 MMOL/L (ref 3.4–5.3)
POTASSIUM SERPL-SCNC: 4.3 MMOL/L (ref 3.4–5.3)
POTASSIUM SERPL-SCNC: 4.3 MMOL/L (ref 3.4–5.3)
POTASSIUM SERPL-SCNC: 4.5 MMOL/L (ref 3.4–5.3)
POTASSIUM SERPL-SCNC: 4.7 MMOL/L (ref 3.4–5.3)
POTASSIUM SERPL-SCNC: 4.9 MMOL/L (ref 3.4–5.3)
POTASSIUM SERPL-SCNC: 5 MMOL/L (ref 3.4–5.3)
POTASSIUM SERPL-SCNC: 5 MMOL/L (ref 3.4–5.3)
POTASSIUM SERPL-SCNC: 5.4 MMOL/L (ref 3.4–5.3)
POTASSIUM SERPL-SCNC: 5.4 MMOL/L (ref 3.4–5.3)
PROCALCITONIN SERPL-MCNC: 0.48 NG/ML
PROCALCITONIN SERPL-MCNC: 0.5 NG/ML
PROT SERPL-MCNC: 5.9 G/DL (ref 6.8–8.8)
PROT SERPL-MCNC: 5.9 G/DL (ref 6.8–8.8)
PROT SERPL-MCNC: 6.5 G/DL (ref 6.8–8.8)
PROT SERPL-MCNC: 6.5 G/DL (ref 6.8–8.8)
PROT SERPL-MCNC: 6.9 G/DL (ref 6.8–8.8)
PROT SERPL-MCNC: 7 G/DL (ref 6.8–8.8)
PROT SERPL-MCNC: 7.2 G/DL (ref 6.8–8.8)
PROT SERPL-MCNC: 7.7 G/DL (ref 6.8–8.8)
PROT SERPL-MCNC: 8.7 G/DL (ref 6.8–8.8)
PROT SERPL-MCNC: 9.8 G/DL (ref 6.8–8.8)
R TIME UNTIL CLOT FORMS: 4.8 MINUTE (ref 5–10)
R TIME UNTIL CLOT FORMS: NORMAL MINUTE (ref 5–10)
RADIOLOGIST FLAGS: ABNORMAL
RADIOLOGIST FLAGS: ABNORMAL
RBC # BLD AUTO: 2.14 10E12/L (ref 4.4–5.9)
RBC # BLD AUTO: 2.3 10E12/L (ref 4.4–5.9)
RBC # BLD AUTO: 2.57 10E12/L (ref 4.4–5.9)
RBC # BLD AUTO: 2.68 10E12/L (ref 4.4–5.9)
RBC # BLD AUTO: 2.76 10E12/L (ref 4.4–5.9)
RBC # BLD AUTO: 2.85 10E12/L (ref 4.4–5.9)
RBC # BLD AUTO: 2.87 10E12/L (ref 4.4–5.9)
RBC # BLD AUTO: 2.89 10E12/L (ref 4.4–5.9)
RBC # BLD AUTO: 2.96 10E12/L (ref 4.4–5.9)
RBC # BLD AUTO: 2.99 10E12/L (ref 4.4–5.9)
RBC # BLD AUTO: 3.02 10E12/L (ref 4.4–5.9)
RBC # BLD AUTO: 3.24 10E12/L (ref 4.4–5.9)
RBC # BLD AUTO: 3.55 10E12/L (ref 4.4–5.9)
RBC # BLD AUTO: 5.14 10E12/L (ref 4.4–5.9)
RBC # BLD AUTO: 5.25 10E12/L (ref 4.4–5.9)
RBC # BLD AUTO: 5.34 10E12/L (ref 4.4–5.9)
RVPLETH-%PRED-PRE: 247 %
RVPLETH-PRE: 5.65 L
RVPLETH-PRED: 2.28 L
SAO2 % BLDA FROM PO2: 94 % (ref 92–100)
SODIUM BLD-SCNC: 140 MMOL/L (ref 133–144)
SODIUM SERPL-SCNC: 134 MMOL/L (ref 133–144)
SODIUM SERPL-SCNC: 136 MMOL/L (ref 133–144)
SODIUM SERPL-SCNC: 137 MMOL/L (ref 133–144)
SODIUM SERPL-SCNC: 137 MMOL/L (ref 133–144)
SODIUM SERPL-SCNC: 138 MMOL/L (ref 133–144)
SODIUM SERPL-SCNC: 138 MMOL/L (ref 133–144)
SODIUM SERPL-SCNC: 139 MMOL/L (ref 133–144)
SODIUM SERPL-SCNC: 140 MMOL/L (ref 133–144)
SODIUM SERPL-SCNC: 141 MMOL/L (ref 133–144)
SODIUM SERPL-SCNC: 144 MMOL/L (ref 133–144)
SODIUM SERPL-SCNC: 145 MMOL/L (ref 133–144)
SODIUM SERPL-SCNC: 145 MMOL/L (ref 133–144)
SPECIMEN EXP DATE BLD: NORMAL
SPECIMEN SOURCE: ABNORMAL
SPECIMEN SOURCE: NORMAL
SPECIMEN SOURCE: NORMAL
TLCPLETH-%PRED-PRE: 117 %
TLCPLETH-PRE: 7.04 L
TLCPLETH-PRED: 6.01 L
TRANSFUSION STATUS PATIENT QL: NORMAL
VA-%PRED-PRE: 44 %
VA-PRE: 2.56 L
VC-%PRED-PRE: 34 %
VC-PRE: 1.39 L
VC-PRED: 3.98 L
WBC # BLD AUTO: 10.3 10E9/L (ref 4–11)
WBC # BLD AUTO: 12.1 10E9/L (ref 4–11)
WBC # BLD AUTO: 12.9 10E9/L (ref 4–11)
WBC # BLD AUTO: 12.9 10E9/L (ref 4–11)
WBC # BLD AUTO: 13 10E9/L (ref 4–11)
WBC # BLD AUTO: 13.5 10E9/L (ref 4–11)
WBC # BLD AUTO: 14 10E9/L (ref 4–11)
WBC # BLD AUTO: 16.5 10E9/L (ref 4–11)
WBC # BLD AUTO: 16.6 10E9/L (ref 4–11)
WBC # BLD AUTO: 17.9 10E9/L (ref 4–11)
WBC # BLD AUTO: 20.9 10E9/L (ref 4–11)
WBC # BLD AUTO: 23 10E9/L (ref 4–11)
WBC # BLD AUTO: 7.3 10E9/L (ref 4–11)
WBC # BLD AUTO: 9.4 10E9/L (ref 4–11)
WBC # BLD AUTO: 9.8 10E9/L (ref 4–11)
WBC # BLD AUTO: 9.8 10E9/L (ref 4–11)

## 2018-01-01 PROCEDURE — 25000125 ZZHC RX 250: Performed by: RADIOLOGY

## 2018-01-01 PROCEDURE — 93350 STRESS TTE ONLY: CPT | Mod: 26 | Performed by: INTERNAL MEDICINE

## 2018-01-01 PROCEDURE — 25000132 ZZH RX MED GY IP 250 OP 250 PS 637: Performed by: STUDENT IN AN ORGANIZED HEALTH CARE EDUCATION/TRAINING PROGRAM

## 2018-01-01 PROCEDURE — 25000132 ZZH RX MED GY IP 250 OP 250 PS 637: Performed by: PHYSICIAN ASSISTANT

## 2018-01-01 PROCEDURE — 25000128 H RX IP 250 OP 636: Performed by: PHYSICIAN ASSISTANT

## 2018-01-01 PROCEDURE — 25000128 H RX IP 250 OP 636: Performed by: SURGERY

## 2018-01-01 PROCEDURE — C1887 CATHETER, GUIDING: HCPCS

## 2018-01-01 PROCEDURE — 85027 COMPLETE CBC AUTOMATED: CPT | Performed by: SURGERY

## 2018-01-01 PROCEDURE — 86850 RBC ANTIBODY SCREEN: CPT | Performed by: RADIOLOGY

## 2018-01-01 PROCEDURE — 25000132 ZZH RX MED GY IP 250 OP 250 PS 637: Performed by: SURGERY

## 2018-01-01 PROCEDURE — 25800025 ZZH RX 258: Performed by: STUDENT IN AN ORGANIZED HEALTH CARE EDUCATION/TRAINING PROGRAM

## 2018-01-01 PROCEDURE — 86901 BLOOD TYPING SEROLOGIC RH(D): CPT | Performed by: SURGERY

## 2018-01-01 PROCEDURE — 40000344 ZZHCL STATISTIC THAWING COMPONENT: Performed by: SURGERY

## 2018-01-01 PROCEDURE — 94640 AIRWAY INHALATION TREATMENT: CPT | Mod: 76

## 2018-01-01 PROCEDURE — 83735 ASSAY OF MAGNESIUM: CPT | Performed by: SURGERY

## 2018-01-01 PROCEDURE — 25000128 H RX IP 250 OP 636: Performed by: RADIOLOGY

## 2018-01-01 PROCEDURE — 85018 HEMOGLOBIN: CPT | Performed by: PHYSICIAN ASSISTANT

## 2018-01-01 PROCEDURE — C1760 CLOSURE DEV, VASC: HCPCS

## 2018-01-01 PROCEDURE — 97116 GAIT TRAINING THERAPY: CPT | Mod: GP

## 2018-01-01 PROCEDURE — B4121ZZ FLUOROSCOPY OF HEPATIC ARTERY USING LOW OSMOLAR CONTRAST: ICD-10-PCS | Performed by: RADIOLOGY

## 2018-01-01 PROCEDURE — 80048 BASIC METABOLIC PNL TOTAL CA: CPT | Performed by: SURGERY

## 2018-01-01 PROCEDURE — 82805 BLOOD GASES W/O2 SATURATION: CPT | Performed by: SURGERY

## 2018-01-01 PROCEDURE — 97530 THERAPEUTIC ACTIVITIES: CPT | Mod: GP

## 2018-01-01 PROCEDURE — 40000802 ZZH SITE CHECK

## 2018-01-01 PROCEDURE — 99207 ZZC APP CREDIT; MD BILLING SHARED VISIT: CPT | Performed by: PHYSICIAN ASSISTANT

## 2018-01-01 PROCEDURE — 25000128 H RX IP 250 OP 636: Performed by: INTERNAL MEDICINE

## 2018-01-01 PROCEDURE — 25000128 H RX IP 250 OP 636: Performed by: NURSE PRACTITIONER

## 2018-01-01 PROCEDURE — 40000193 ZZH STATISTIC PT WARD VISIT

## 2018-01-01 PROCEDURE — 94640 AIRWAY INHALATION TREATMENT: CPT

## 2018-01-01 PROCEDURE — 25000132 ZZH RX MED GY IP 250 OP 250 PS 637: Performed by: NURSE PRACTITIONER

## 2018-01-01 PROCEDURE — 85610 PROTHROMBIN TIME: CPT | Performed by: SURGERY

## 2018-01-01 PROCEDURE — 80053 COMPREHEN METABOLIC PANEL: CPT | Performed by: RADIOLOGY

## 2018-01-01 PROCEDURE — 25500064 ZZH RX 255 OP 636: Performed by: INTERNAL MEDICINE

## 2018-01-01 PROCEDURE — G0463 HOSPITAL OUTPT CLINIC VISIT: HCPCS | Mod: ZF

## 2018-01-01 PROCEDURE — 25000128 H RX IP 250 OP 636: Performed by: STUDENT IN AN ORGANIZED HEALTH CARE EDUCATION/TRAINING PROGRAM

## 2018-01-01 PROCEDURE — 94003 VENT MGMT INPAT SUBQ DAY: CPT

## 2018-01-01 PROCEDURE — 40000167 ZZH STATISTIC PP CARE STAGE 2

## 2018-01-01 PROCEDURE — P9016 RBC LEUKOCYTES REDUCED: HCPCS | Performed by: RADIOLOGY

## 2018-01-01 PROCEDURE — 36415 COLL VENOUS BLD VENIPUNCTURE: CPT | Performed by: SURGERY

## 2018-01-01 PROCEDURE — 0DBU3ZX EXCISION OF OMENTUM, PERCUTANEOUS APPROACH, DIAGNOSTIC: ICD-10-PCS | Performed by: RADIOLOGY

## 2018-01-01 PROCEDURE — 40000135 MR MHEALTH OVERREAD

## 2018-01-01 PROCEDURE — 36415 COLL VENOUS BLD VENIPUNCTURE: CPT | Performed by: PHYSICIAN ASSISTANT

## 2018-01-01 PROCEDURE — 82140 ASSAY OF AMMONIA: CPT | Performed by: SURGERY

## 2018-01-01 PROCEDURE — 82105 ALPHA-FETOPROTEIN SERUM: CPT | Performed by: STUDENT IN AN ORGANIZED HEALTH CARE EDUCATION/TRAINING PROGRAM

## 2018-01-01 PROCEDURE — 27210780 ZZH KIT CR3

## 2018-01-01 PROCEDURE — 85018 HEMOGLOBIN: CPT | Performed by: SURGERY

## 2018-01-01 PROCEDURE — 71045 X-RAY EXAM CHEST 1 VIEW: CPT

## 2018-01-01 PROCEDURE — 82330 ASSAY OF CALCIUM: CPT

## 2018-01-01 PROCEDURE — 25000128 H RX IP 250 OP 636

## 2018-01-01 PROCEDURE — 36415 COLL VENOUS BLD VENIPUNCTURE: CPT | Performed by: INTERNAL MEDICINE

## 2018-01-01 PROCEDURE — 40000809 ZZH STATISTIC NO DOCUMENTATION TO SUPPORT CHARGE

## 2018-01-01 PROCEDURE — 99205 OFFICE O/P NEW HI 60 MIN: CPT | Mod: ZP | Performed by: INTERNAL MEDICINE

## 2018-01-01 PROCEDURE — 20000004 ZZH R&B ICU UMMC

## 2018-01-01 PROCEDURE — 99152 MOD SED SAME PHYS/QHP 5/>YRS: CPT

## 2018-01-01 PROCEDURE — C1769 GUIDE WIRE: HCPCS

## 2018-01-01 PROCEDURE — 85730 THROMBOPLASTIN TIME PARTIAL: CPT | Performed by: SURGERY

## 2018-01-01 PROCEDURE — 84100 ASSAY OF PHOSPHORUS: CPT | Performed by: SURGERY

## 2018-01-01 PROCEDURE — 83605 ASSAY OF LACTIC ACID: CPT | Performed by: SURGERY

## 2018-01-01 PROCEDURE — 86803 HEPATITIS C AB TEST: CPT | Performed by: PHYSICIAN ASSISTANT

## 2018-01-01 PROCEDURE — 75726 ARTERY X-RAYS ABDOMEN: CPT

## 2018-01-01 PROCEDURE — 25000125 ZZHC RX 250: Performed by: SURGERY

## 2018-01-01 PROCEDURE — 99223 1ST HOSP IP/OBS HIGH 75: CPT | Mod: AI | Performed by: INTERNAL MEDICINE

## 2018-01-01 PROCEDURE — 27210908 ZZH NEEDLE CR4

## 2018-01-01 PROCEDURE — 87340 HEPATITIS B SURFACE AG IA: CPT | Performed by: PHYSICIAN ASSISTANT

## 2018-01-01 PROCEDURE — 99291 CRITICAL CARE FIRST HOUR: CPT | Mod: GC | Performed by: SURGERY

## 2018-01-01 PROCEDURE — 80053 COMPREHEN METABOLIC PANEL: CPT | Performed by: SURGERY

## 2018-01-01 PROCEDURE — 40000983 ZZH STATISTIC HFNC ADULT NON-CPAP

## 2018-01-01 PROCEDURE — 40000047 ZZH STATISTIC CTO2 CONT OXYGEN TECH TIME EA 90 MIN

## 2018-01-01 PROCEDURE — 76705 ECHO EXAM OF ABDOMEN: CPT

## 2018-01-01 PROCEDURE — 27810149 ZZH COIL/EMBOLIC DEVICE CR12

## 2018-01-01 PROCEDURE — 86901 BLOOD TYPING SEROLOGIC RH(D): CPT | Performed by: STUDENT IN AN ORGANIZED HEALTH CARE EDUCATION/TRAINING PROGRAM

## 2018-01-01 PROCEDURE — 86900 BLOOD TYPING SEROLOGIC ABO: CPT | Performed by: SURGERY

## 2018-01-01 PROCEDURE — 82947 ASSAY GLUCOSE BLOOD QUANT: CPT

## 2018-01-01 PROCEDURE — 40000275 ZZH STATISTIC RCP TIME EA 10 MIN

## 2018-01-01 PROCEDURE — 88342 IMHCHEM/IMCYTCHM 1ST ANTB: CPT | Performed by: RADIOLOGY

## 2018-01-01 PROCEDURE — 12000006 ZZH R&B IMCU INTERMEDIATE UMMC

## 2018-01-01 PROCEDURE — 99232 SBSQ HOSP IP/OBS MODERATE 35: CPT | Performed by: INTERNAL MEDICINE

## 2018-01-01 PROCEDURE — 36600 WITHDRAWAL OF ARTERIAL BLOOD: CPT

## 2018-01-01 PROCEDURE — 82803 BLOOD GASES ANY COMBINATION: CPT

## 2018-01-01 PROCEDURE — 36584 COMPL RPLCMT PICC RS&I: CPT

## 2018-01-01 PROCEDURE — 99239 HOSP IP/OBS DSCHRG MGMT >30: CPT | Performed by: INTERNAL MEDICINE

## 2018-01-01 PROCEDURE — 99153 MOD SED SAME PHYS/QHP EA: CPT

## 2018-01-01 PROCEDURE — 85018 HEMOGLOBIN: CPT | Performed by: RADIOLOGY

## 2018-01-01 PROCEDURE — 94660 CPAP INITIATION&MGMT: CPT

## 2018-01-01 PROCEDURE — 88305 TISSUE EXAM BY PATHOLOGIST: CPT | Performed by: RADIOLOGY

## 2018-01-01 PROCEDURE — 40000141 ZZH STATISTIC PERIPHERAL IV START W/O US GUIDANCE

## 2018-01-01 PROCEDURE — 99233 SBSQ HOSP IP/OBS HIGH 50: CPT | Performed by: PHYSICIAN ASSISTANT

## 2018-01-01 PROCEDURE — 80053 COMPREHEN METABOLIC PANEL: CPT | Performed by: PHYSICIAN ASSISTANT

## 2018-01-01 PROCEDURE — 27210732 ZZH ACCESSORY CR1

## 2018-01-01 PROCEDURE — 83605 ASSAY OF LACTIC ACID: CPT | Performed by: INTERNAL MEDICINE

## 2018-01-01 PROCEDURE — 93350 STRESS TTE ONLY: CPT | Mod: TC

## 2018-01-01 PROCEDURE — 84145 PROCALCITONIN (PCT): CPT | Performed by: PHYSICIAN ASSISTANT

## 2018-01-01 PROCEDURE — 83735 ASSAY OF MAGNESIUM: CPT | Performed by: RADIOLOGY

## 2018-01-01 PROCEDURE — 86301 IMMUNOASSAY TUMOR CA 19-9: CPT | Performed by: SURGERY

## 2018-01-01 PROCEDURE — 99223 1ST HOSP IP/OBS HIGH 75: CPT | Performed by: NURSE PRACTITIONER

## 2018-01-01 PROCEDURE — 40000559 ZZH STATISTIC FAILED PERIPHERAL IV START

## 2018-01-01 PROCEDURE — P9016 RBC LEUKOCYTES REDUCED: HCPCS | Performed by: SURGERY

## 2018-01-01 PROCEDURE — 85027 COMPLETE CBC AUTOMATED: CPT | Performed by: PHYSICIAN ASSISTANT

## 2018-01-01 PROCEDURE — 47000 NEEDLE BIOPSY OF LIVER PERQ: CPT

## 2018-01-01 PROCEDURE — 86900 BLOOD TYPING SEROLOGIC ABO: CPT | Performed by: STUDENT IN AN ORGANIZED HEALTH CARE EDUCATION/TRAINING PROGRAM

## 2018-01-01 PROCEDURE — 84132 ASSAY OF SERUM POTASSIUM: CPT

## 2018-01-01 PROCEDURE — 84132 ASSAY OF SERUM POTASSIUM: CPT | Performed by: STUDENT IN AN ORGANIZED HEALTH CARE EDUCATION/TRAINING PROGRAM

## 2018-01-01 PROCEDURE — 87081 CULTURE SCREEN ONLY: CPT | Performed by: SURGERY

## 2018-01-01 PROCEDURE — 86706 HEP B SURFACE ANTIBODY: CPT | Performed by: PHYSICIAN ASSISTANT

## 2018-01-01 PROCEDURE — 81332 SERPINA1 GENE: CPT | Performed by: INTERNAL MEDICINE

## 2018-01-01 PROCEDURE — 40000135 CT MHEALTH OVERREAD

## 2018-01-01 PROCEDURE — G0463 HOSPITAL OUTPT CLINIC VISIT: HCPCS

## 2018-01-01 PROCEDURE — 88333 PATH CONSLTJ SURG CYTO XM 1: CPT | Performed by: RADIOLOGY

## 2018-01-01 PROCEDURE — 99207 ZZC CDG-MDM COMPONENT: MEETS LOW - DOWN CODED: CPT | Performed by: PHYSICIAN ASSISTANT

## 2018-01-01 PROCEDURE — 93325 DOPPLER ECHO COLOR FLOW MAPG: CPT | Mod: 26 | Performed by: INTERNAL MEDICINE

## 2018-01-01 PROCEDURE — 88341 IMHCHEM/IMCYTCHM EA ADD ANTB: CPT | Performed by: RADIOLOGY

## 2018-01-01 PROCEDURE — 82103 ALPHA-1-ANTITRYPSIN TOTAL: CPT | Performed by: INTERNAL MEDICINE

## 2018-01-01 PROCEDURE — 27210804 ZZH SHEATH CR3

## 2018-01-01 PROCEDURE — 88313 SPECIAL STAINS GROUP 2: CPT | Performed by: RADIOLOGY

## 2018-01-01 PROCEDURE — 99207 ZZC CDG-MDM COMPONENT: MEETS LOW - DOWN CODED: CPT | Performed by: INTERNAL MEDICINE

## 2018-01-01 PROCEDURE — 82803 BLOOD GASES ANY COMBINATION: CPT | Performed by: STUDENT IN AN ORGANIZED HEALTH CARE EDUCATION/TRAINING PROGRAM

## 2018-01-01 PROCEDURE — 74177 CT ABD & PELVIS W/CONTRAST: CPT

## 2018-01-01 PROCEDURE — 99233 SBSQ HOSP IP/OBS HIGH 50: CPT | Performed by: NURSE PRACTITIONER

## 2018-01-01 PROCEDURE — 36247 INS CATH ABD/L-EXT ART 3RD: CPT

## 2018-01-01 PROCEDURE — 99232 SBSQ HOSP IP/OBS MODERATE 35: CPT | Performed by: NURSE PRACTITIONER

## 2018-01-01 PROCEDURE — 82378 CARCINOEMBRYONIC ANTIGEN: CPT | Performed by: SURGERY

## 2018-01-01 PROCEDURE — 82803 BLOOD GASES ANY COMBINATION: CPT | Performed by: SURGERY

## 2018-01-01 PROCEDURE — 97535 SELF CARE MNGMENT TRAINING: CPT | Mod: GO

## 2018-01-01 PROCEDURE — 40000275 ZZH STATISTIC RCP TIME EA 10 MIN: Performed by: OPTOMETRIST

## 2018-01-01 PROCEDURE — 99205 OFFICE O/P NEW HI 60 MIN: CPT | Mod: ZP | Performed by: SURGERY

## 2018-01-01 PROCEDURE — 76942 ECHO GUIDE FOR BIOPSY: CPT

## 2018-01-01 PROCEDURE — 99233 SBSQ HOSP IP/OBS HIGH 50: CPT | Performed by: INTERNAL MEDICINE

## 2018-01-01 PROCEDURE — 36592 COLLECT BLOOD FROM PICC: CPT | Performed by: SURGERY

## 2018-01-01 PROCEDURE — 93018 CV STRESS TEST I&R ONLY: CPT | Performed by: INTERNAL MEDICINE

## 2018-01-01 PROCEDURE — 84132 ASSAY OF SERUM POTASSIUM: CPT | Performed by: SURGERY

## 2018-01-01 PROCEDURE — 12000001 ZZH R&B MED SURG/OB UMMC

## 2018-01-01 PROCEDURE — 25000125 ZZHC RX 250: Performed by: STUDENT IN AN ORGANIZED HEALTH CARE EDUCATION/TRAINING PROGRAM

## 2018-01-01 PROCEDURE — 97165 OT EVAL LOW COMPLEX 30 MIN: CPT | Mod: GO

## 2018-01-01 PROCEDURE — 36569 INSJ PICC 5 YR+ W/O IMAGING: CPT

## 2018-01-01 PROCEDURE — 94799 UNLISTED PULMONARY SVC/PX: CPT

## 2018-01-01 PROCEDURE — 37244 VASC EMBOLIZE/OCCLUDE BLEED: CPT

## 2018-01-01 PROCEDURE — 25000125 ZZHC RX 250: Performed by: PHYSICIAN ASSISTANT

## 2018-01-01 PROCEDURE — 97750 PHYSICAL PERFORMANCE TEST: CPT | Mod: GP

## 2018-01-01 PROCEDURE — 94002 VENT MGMT INPAT INIT DAY: CPT

## 2018-01-01 PROCEDURE — 27210905 ZZH KIT CR7

## 2018-01-01 PROCEDURE — 93005 ELECTROCARDIOGRAM TRACING: CPT

## 2018-01-01 PROCEDURE — 80048 BASIC METABOLIC PNL TOTAL CA: CPT | Performed by: INTERNAL MEDICINE

## 2018-01-01 PROCEDURE — 04L33ZZ OCCLUSION OF HEPATIC ARTERY, PERCUTANEOUS APPROACH: ICD-10-PCS | Performed by: RADIOLOGY

## 2018-01-01 PROCEDURE — 36415 COLL VENOUS BLD VENIPUNCTURE: CPT | Performed by: STUDENT IN AN ORGANIZED HEALTH CARE EDUCATION/TRAINING PROGRAM

## 2018-01-01 PROCEDURE — 96374 THER/PROPH/DIAG INJ IV PUSH: CPT

## 2018-01-01 PROCEDURE — 86850 RBC ANTIBODY SCREEN: CPT | Performed by: STUDENT IN AN ORGANIZED HEALTH CARE EDUCATION/TRAINING PROGRAM

## 2018-01-01 PROCEDURE — 83880 ASSAY OF NATRIURETIC PEPTIDE: CPT | Performed by: PHYSICIAN ASSISTANT

## 2018-01-01 PROCEDURE — 82746 ASSAY OF FOLIC ACID SERUM: CPT | Performed by: SURGERY

## 2018-01-01 PROCEDURE — 80076 HEPATIC FUNCTION PANEL: CPT | Performed by: INTERNAL MEDICINE

## 2018-01-01 PROCEDURE — 00000146 ZZHCL STATISTIC GLUCOSE BY METER IP

## 2018-01-01 PROCEDURE — 40000281 ZZH STATISTIC TRANSPORT TIME EA 15 MIN

## 2018-01-01 PROCEDURE — 87641 MR-STAPH DNA AMP PROBE: CPT | Performed by: INTERNAL MEDICINE

## 2018-01-01 PROCEDURE — 86901 BLOOD TYPING SEROLOGIC RH(D): CPT | Performed by: RADIOLOGY

## 2018-01-01 PROCEDURE — 99221 1ST HOSP IP/OBS SF/LOW 40: CPT | Mod: GC | Performed by: SURGERY

## 2018-01-01 PROCEDURE — 27211040 ZZH CONTINUOUS NEBULIZER MICRO PUMP

## 2018-01-01 PROCEDURE — 93010 ELECTROCARDIOGRAM REPORT: CPT | Performed by: INTERNAL MEDICINE

## 2018-01-01 PROCEDURE — 27210914 ZZH SHEATH CR8

## 2018-01-01 PROCEDURE — 99214 OFFICE O/P EST MOD 30 MIN: CPT | Performed by: FAMILY MEDICINE

## 2018-01-01 PROCEDURE — 84295 ASSAY OF SERUM SODIUM: CPT

## 2018-01-01 PROCEDURE — 87640 STAPH A DNA AMP PROBE: CPT | Performed by: INTERNAL MEDICINE

## 2018-01-01 PROCEDURE — 86850 RBC ANTIBODY SCREEN: CPT | Performed by: SURGERY

## 2018-01-01 PROCEDURE — 74183 MRI ABD W/O CNTR FLWD CNTR: CPT

## 2018-01-01 PROCEDURE — 85396 CLOTTING ASSAY WHOLE BLOOD: CPT | Performed by: SURGERY

## 2018-01-01 PROCEDURE — 40000671 ZZH STATISTIC ANESTHESIA CASE

## 2018-01-01 PROCEDURE — 85027 COMPLETE CBC AUTOMATED: CPT | Performed by: INTERNAL MEDICINE

## 2018-01-01 PROCEDURE — 94640 AIRWAY INHALATION TREATMENT: CPT | Mod: 76 | Performed by: OPTOMETRIST

## 2018-01-01 PROCEDURE — 27210197 ZZH KIT POWER PICC TRIPLE LUMEN

## 2018-01-01 PROCEDURE — 40000556 ZZH STATISTIC PERIPHERAL IV START W US GUIDANCE

## 2018-01-01 PROCEDURE — 85610 PROTHROMBIN TIME: CPT | Performed by: RADIOLOGY

## 2018-01-01 PROCEDURE — 82105 ALPHA-FETOPROTEIN SERUM: CPT | Performed by: PHYSICIAN ASSISTANT

## 2018-01-01 PROCEDURE — 04LH3DZ OCCLUSION OF RIGHT EXTERNAL ILIAC ARTERY WITH INTRALUMINAL DEVICE, PERCUTANEOUS APPROACH: ICD-10-PCS | Performed by: RADIOLOGY

## 2018-01-01 PROCEDURE — 71250 CT THORAX DX C-: CPT

## 2018-01-01 PROCEDURE — 86140 C-REACTIVE PROTEIN: CPT | Performed by: PHYSICIAN ASSISTANT

## 2018-01-01 PROCEDURE — 85014 HEMATOCRIT: CPT

## 2018-01-01 PROCEDURE — P9059 PLASMA, FRZ BETWEEN 8-24HOUR: HCPCS | Performed by: SURGERY

## 2018-01-01 PROCEDURE — 27210903 ZZH KIT CR5

## 2018-01-01 PROCEDURE — 85384 FIBRINOGEN ACTIVITY: CPT | Performed by: SURGERY

## 2018-01-01 PROCEDURE — G0463 HOSPITAL OUTPT CLINIC VISIT: HCPCS | Mod: 25

## 2018-01-01 PROCEDURE — 93321 DOPPLER ECHO F-UP/LMTD STD: CPT | Mod: 26 | Performed by: INTERNAL MEDICINE

## 2018-01-01 PROCEDURE — 25000125 ZZHC RX 250: Performed by: INTERNAL MEDICINE

## 2018-01-01 PROCEDURE — 40000989 ZZH STATISTIC CHRONIC PULMONARY DISEASE SPECIALIST

## 2018-01-01 PROCEDURE — 97112 NEUROMUSCULAR REEDUCATION: CPT | Mod: GP

## 2018-01-01 PROCEDURE — 40000166 ZZH STATISTIC PP CARE STAGE 1

## 2018-01-01 PROCEDURE — 44500 INTRO GASTROINTESTINAL TUBE: CPT

## 2018-01-01 PROCEDURE — 99291 CRITICAL CARE FIRST HOUR: CPT | Performed by: NURSE PRACTITIONER

## 2018-01-01 PROCEDURE — 99232 SBSQ HOSP IP/OBS MODERATE 35: CPT | Performed by: PHYSICIAN ASSISTANT

## 2018-01-01 PROCEDURE — 25000132 ZZH RX MED GY IP 250 OP 250 PS 637: Performed by: INTERNAL MEDICINE

## 2018-01-01 PROCEDURE — 27210429 ZZH NUTRITION PRODUCT INTERMEDIATE LITER

## 2018-01-01 PROCEDURE — 99204 OFFICE O/P NEW MOD 45 MIN: CPT | Mod: ZP | Performed by: INTERNAL MEDICINE

## 2018-01-01 PROCEDURE — 85025 COMPLETE CBC W/AUTO DIFF WBC: CPT | Performed by: RADIOLOGY

## 2018-01-01 PROCEDURE — 74174 CTA ABD&PLVS W/CONTRAST: CPT

## 2018-01-01 PROCEDURE — 85610 PROTHROMBIN TIME: CPT | Performed by: PHYSICIAN ASSISTANT

## 2018-01-01 PROCEDURE — 93016 CV STRESS TEST SUPVJ ONLY: CPT | Performed by: INTERNAL MEDICINE

## 2018-01-01 PROCEDURE — 75774 ARTERY X-RAY EACH VESSEL: CPT

## 2018-01-01 PROCEDURE — 27210909 ZZH NEEDLE CR5

## 2018-01-01 PROCEDURE — 97161 PT EVAL LOW COMPLEX 20 MIN: CPT | Mod: GP

## 2018-01-01 PROCEDURE — 40000986 XR ABDOMEN PORT F1 VW

## 2018-01-01 PROCEDURE — 85610 PROTHROMBIN TIME: CPT | Performed by: INTERNAL MEDICINE

## 2018-01-01 PROCEDURE — A9585 GADOBUTROL INJECTION: HCPCS | Performed by: RADIOLOGY

## 2018-01-01 PROCEDURE — 36248 INS CATH ABD/L-EXT ART ADDL: CPT

## 2018-01-01 PROCEDURE — 96365 THER/PROPH/DIAG IV INF INIT: CPT

## 2018-01-01 PROCEDURE — 40000133 ZZH STATISTIC OT WARD VISIT

## 2018-01-01 PROCEDURE — 74018 RADEX ABDOMEN 1 VIEW: CPT

## 2018-01-01 PROCEDURE — 86900 BLOOD TYPING SEROLOGIC ABO: CPT | Performed by: RADIOLOGY

## 2018-01-01 PROCEDURE — 25800025 ZZH RX 258

## 2018-01-01 PROCEDURE — 99207 ZZC APP CREDIT; MD BILLING SHARED VISIT: CPT | Performed by: NURSE PRACTITIONER

## 2018-01-01 PROCEDURE — 25000125 ZZHC RX 250

## 2018-01-01 PROCEDURE — 86923 COMPATIBILITY TEST ELECTRIC: CPT | Performed by: RADIOLOGY

## 2018-01-01 PROCEDURE — 86923 COMPATIBILITY TEST ELECTRIC: CPT | Performed by: SURGERY

## 2018-01-01 PROCEDURE — 40000168 ZZH STATISTIC PP CARE STAGE 3

## 2018-01-01 RX ORDER — ONDANSETRON 4 MG/1
4 TABLET, ORALLY DISINTEGRATING ORAL EVERY 6 HOURS PRN
Status: DISCONTINUED | OUTPATIENT
Start: 2018-01-01 | End: 2018-01-01 | Stop reason: HOSPADM

## 2018-01-01 RX ORDER — ALBUTEROL SULFATE 0.83 MG/ML
2.5 SOLUTION RESPIRATORY (INHALATION)
Status: DISCONTINUED | OUTPATIENT
Start: 2018-01-01 | End: 2018-01-01

## 2018-01-01 RX ORDER — DIAZEPAM 5 MG
5-20 TABLET ORAL SEE ADMIN INSTRUCTIONS
Status: DISCONTINUED | OUTPATIENT
Start: 2018-01-01 | End: 2018-01-01

## 2018-01-01 RX ORDER — ALBUTEROL SULFATE 0.83 MG/ML
2.5 SOLUTION RESPIRATORY (INHALATION)
Status: DISCONTINUED | OUTPATIENT
Start: 2018-01-01 | End: 2018-01-01 | Stop reason: HOSPADM

## 2018-01-01 RX ORDER — SODIUM CHLORIDE 9 MG/ML
INJECTION, SOLUTION INTRAVENOUS CONTINUOUS
Status: DISCONTINUED | OUTPATIENT
Start: 2018-01-01 | End: 2018-01-01

## 2018-01-01 RX ORDER — LIDOCAINE 40 MG/G
CREAM TOPICAL
Status: DISCONTINUED | OUTPATIENT
Start: 2018-01-01 | End: 2018-01-01 | Stop reason: HOSPADM

## 2018-01-01 RX ORDER — SODIUM CHLORIDE 9 MG/ML
INJECTION, SOLUTION INTRAVENOUS CONTINUOUS
Status: DISCONTINUED | OUTPATIENT
Start: 2018-01-01 | End: 2018-01-01 | Stop reason: HOSPADM

## 2018-01-01 RX ORDER — ALBUTEROL SULFATE 0.83 MG/ML
1.25 SOLUTION RESPIRATORY (INHALATION)
Status: DISCONTINUED | OUTPATIENT
Start: 2018-01-01 | End: 2018-01-01

## 2018-01-01 RX ORDER — FUROSEMIDE 10 MG/ML
60 INJECTION INTRAMUSCULAR; INTRAVENOUS ONCE
Status: COMPLETED | OUTPATIENT
Start: 2018-01-01 | End: 2018-01-01

## 2018-01-01 RX ORDER — GABAPENTIN 100 MG/1
100-200 CAPSULE ORAL 3 TIMES DAILY
Qty: 90 CAPSULE | Refills: 1 | Status: SHIPPED | OUTPATIENT
Start: 2018-01-01

## 2018-01-01 RX ORDER — FOLIC ACID 1 MG/1
1 TABLET ORAL DAILY
Qty: 30 TABLET | Refills: 0 | Status: SHIPPED | OUTPATIENT
Start: 2018-01-01 | End: 2018-01-01

## 2018-01-01 RX ORDER — POLYETHYLENE GLYCOL 3350 17 G/17G
17 POWDER, FOR SOLUTION ORAL DAILY
Status: DISCONTINUED | OUTPATIENT
Start: 2018-01-01 | End: 2018-01-01

## 2018-01-01 RX ORDER — FLUMAZENIL 0.1 MG/ML
0.2 INJECTION, SOLUTION INTRAVENOUS
Status: DISCONTINUED | OUTPATIENT
Start: 2018-01-01 | End: 2018-01-01 | Stop reason: HOSPADM

## 2018-01-01 RX ORDER — HEPARIN SODIUM,PORCINE 10 UNIT/ML
2-5 VIAL (ML) INTRAVENOUS
Status: COMPLETED | OUTPATIENT
Start: 2018-01-01 | End: 2018-01-01

## 2018-01-01 RX ORDER — PROPOFOL 10 MG/ML
5-75 INJECTION, EMULSION INTRAVENOUS CONTINUOUS
Status: DISCONTINUED | OUTPATIENT
Start: 2018-01-01 | End: 2018-01-01

## 2018-01-01 RX ORDER — POTASSIUM CHLORIDE 29.8 MG/ML
20 INJECTION INTRAVENOUS
Status: DISCONTINUED | OUTPATIENT
Start: 2018-01-01 | End: 2018-01-01 | Stop reason: HOSPADM

## 2018-01-01 RX ORDER — NALOXONE HYDROCHLORIDE 0.4 MG/ML
.1-.4 INJECTION, SOLUTION INTRAMUSCULAR; INTRAVENOUS; SUBCUTANEOUS
Status: DISCONTINUED | OUTPATIENT
Start: 2018-01-01 | End: 2018-01-01 | Stop reason: HOSPADM

## 2018-01-01 RX ORDER — POTASSIUM CL/LIDO/0.9 % NACL 10MEQ/0.1L
10 INTRAVENOUS SOLUTION, PIGGYBACK (ML) INTRAVENOUS
Status: DISCONTINUED | OUTPATIENT
Start: 2018-01-01 | End: 2018-01-01 | Stop reason: HOSPADM

## 2018-01-01 RX ORDER — HYDROMORPHONE HYDROCHLORIDE 1 MG/ML
INJECTION, SOLUTION INTRAMUSCULAR; INTRAVENOUS; SUBCUTANEOUS
Status: COMPLETED
Start: 2018-01-01 | End: 2018-01-01

## 2018-01-01 RX ORDER — IOPAMIDOL 755 MG/ML
91 INJECTION, SOLUTION INTRAVASCULAR ONCE
Status: COMPLETED | OUTPATIENT
Start: 2018-01-01 | End: 2018-01-01

## 2018-01-01 RX ORDER — POTASSIUM CHLORIDE 750 MG/1
20-40 TABLET, EXTENDED RELEASE ORAL
Status: DISCONTINUED | OUTPATIENT
Start: 2018-01-01 | End: 2018-01-01 | Stop reason: HOSPADM

## 2018-01-01 RX ORDER — HYDROMORPHONE HYDROCHLORIDE 2 MG/1
2 TABLET ORAL
Status: DISCONTINUED | OUTPATIENT
Start: 2018-01-01 | End: 2018-01-01

## 2018-01-01 RX ORDER — HYDROMORPHONE HYDROCHLORIDE 1 MG/ML
.3-.5 INJECTION, SOLUTION INTRAMUSCULAR; INTRAVENOUS; SUBCUTANEOUS
Status: DISCONTINUED | OUTPATIENT
Start: 2018-01-01 | End: 2018-01-01

## 2018-01-01 RX ORDER — PROPOFOL 10 MG/ML
INJECTION, EMULSION INTRAVENOUS PRN
Status: DISCONTINUED | OUTPATIENT
Start: 2018-01-01 | End: 2018-01-01

## 2018-01-01 RX ORDER — IPRATROPIUM BROMIDE AND ALBUTEROL SULFATE 2.5; .5 MG/3ML; MG/3ML
3 SOLUTION RESPIRATORY (INHALATION) 4 TIMES DAILY
Status: DISCONTINUED | OUTPATIENT
Start: 2018-01-01 | End: 2018-01-01 | Stop reason: HOSPADM

## 2018-01-01 RX ORDER — AZITHROMYCIN 250 MG/1
TABLET, FILM COATED ORAL
Qty: 6 TABLET | Refills: 0 | Status: SHIPPED | OUTPATIENT
Start: 2018-01-01

## 2018-01-01 RX ORDER — IOPAMIDOL 755 MG/ML
91 INJECTION, SOLUTION INTRAVASCULAR ONCE
Status: DISCONTINUED | OUTPATIENT
Start: 2018-01-01 | End: 2018-01-01 | Stop reason: CLARIF

## 2018-01-01 RX ORDER — FOLIC ACID 1 MG/1
1 TABLET ORAL DAILY
Status: DISCONTINUED | OUTPATIENT
Start: 2018-01-01 | End: 2018-01-01 | Stop reason: HOSPADM

## 2018-01-01 RX ORDER — DEXTROSE MONOHYDRATE 25 G/50ML
25 INJECTION, SOLUTION INTRAVENOUS ONCE
Status: COMPLETED | OUTPATIENT
Start: 2018-01-01 | End: 2018-01-01

## 2018-01-01 RX ORDER — IPRATROPIUM BROMIDE AND ALBUTEROL SULFATE 2.5; .5 MG/3ML; MG/3ML
3 SOLUTION RESPIRATORY (INHALATION)
Status: DISCONTINUED | OUTPATIENT
Start: 2018-01-01 | End: 2018-01-01 | Stop reason: HOSPADM

## 2018-01-01 RX ORDER — ACETAMINOPHEN 325 MG/1
650 TABLET ORAL EVERY 4 HOURS PRN
Status: DISCONTINUED | OUTPATIENT
Start: 2018-01-01 | End: 2018-01-01

## 2018-01-01 RX ORDER — FENTANYL CITRATE 50 UG/ML
25-50 INJECTION, SOLUTION INTRAMUSCULAR; INTRAVENOUS EVERY 5 MIN PRN
Status: DISCONTINUED | OUTPATIENT
Start: 2018-01-01 | End: 2018-01-01 | Stop reason: HOSPADM

## 2018-01-01 RX ORDER — HALOPERIDOL 5 MG/ML
2.5-5 INJECTION INTRAMUSCULAR EVERY 6 HOURS PRN
Status: DISCONTINUED | OUTPATIENT
Start: 2018-01-01 | End: 2018-01-01

## 2018-01-01 RX ORDER — DEXTROSE MONOHYDRATE 25 G/50ML
50 INJECTION, SOLUTION INTRAVENOUS ONCE
Status: DISCONTINUED | OUTPATIENT
Start: 2018-01-01 | End: 2018-01-01

## 2018-01-01 RX ORDER — METOPROLOL TARTRATE 1 MG/ML
1-30 INJECTION, SOLUTION INTRAVENOUS
Status: DISPENSED | OUTPATIENT
Start: 2018-01-01 | End: 2018-01-01

## 2018-01-01 RX ORDER — KETOROLAC TROMETHAMINE 15 MG/ML
15 INJECTION, SOLUTION INTRAMUSCULAR; INTRAVENOUS
Status: DISCONTINUED | OUTPATIENT
Start: 2018-01-01 | End: 2018-01-01

## 2018-01-01 RX ORDER — AMINO ACIDS/PROTEIN HYDROLYS 11G-40/45
1 LIQUID IN PACKET (ML) ORAL 3 TIMES DAILY
Status: DISCONTINUED | OUTPATIENT
Start: 2018-01-01 | End: 2018-01-01 | Stop reason: HOSPADM

## 2018-01-01 RX ORDER — BISACODYL 10 MG
10 SUPPOSITORY, RECTAL RECTAL DAILY PRN
Status: DISCONTINUED | OUTPATIENT
Start: 2018-01-01 | End: 2018-01-01 | Stop reason: HOSPADM

## 2018-01-01 RX ORDER — FUROSEMIDE 10 MG/ML
40 INJECTION INTRAMUSCULAR; INTRAVENOUS ONCE
Status: COMPLETED | OUTPATIENT
Start: 2018-01-01 | End: 2018-01-01

## 2018-01-01 RX ORDER — NICOTINE POLACRILEX 4 MG
15-30 LOZENGE BUCCAL
Status: DISCONTINUED | OUTPATIENT
Start: 2018-01-01 | End: 2018-01-01 | Stop reason: HOSPADM

## 2018-01-01 RX ORDER — FLUTICASONE PROPIONATE 50 MCG
2 SPRAY, SUSPENSION (ML) NASAL DAILY
COMMUNITY
End: 2018-01-01

## 2018-01-01 RX ORDER — HEPARIN SODIUM,PORCINE 10 UNIT/ML
5-10 VIAL (ML) INTRAVENOUS
Status: DISCONTINUED | OUTPATIENT
Start: 2018-01-01 | End: 2018-01-01 | Stop reason: HOSPADM

## 2018-01-01 RX ORDER — LANOLIN ALCOHOL/MO/W.PET/CERES
100 CREAM (GRAM) TOPICAL DAILY
Qty: 30 TABLET | Refills: 0 | Status: SHIPPED | OUTPATIENT
Start: 2018-01-01

## 2018-01-01 RX ORDER — ALBUTEROL SULFATE 90 UG/1
2 AEROSOL, METERED RESPIRATORY (INHALATION) EVERY 4 HOURS PRN
COMMUNITY

## 2018-01-01 RX ORDER — IOPAMIDOL 755 MG/ML
100 INJECTION, SOLUTION INTRAVASCULAR ONCE
Status: COMPLETED | OUTPATIENT
Start: 2018-01-01 | End: 2018-01-01

## 2018-01-01 RX ORDER — HYDROMORPHONE HCL/0.9% NACL/PF 0.2MG/0.2
0.2 SYRINGE (ML) INTRAVENOUS
Status: DISCONTINUED | OUTPATIENT
Start: 2018-01-01 | End: 2018-01-01

## 2018-01-01 RX ORDER — DEXTROSE MONOHYDRATE 25 G/50ML
25-50 INJECTION, SOLUTION INTRAVENOUS
Status: DISCONTINUED | OUTPATIENT
Start: 2018-01-01 | End: 2018-01-01 | Stop reason: HOSPADM

## 2018-01-01 RX ORDER — IODIXANOL 320 MG/ML
150 INJECTION, SOLUTION INTRAVASCULAR ONCE
Status: COMPLETED | OUTPATIENT
Start: 2018-01-01 | End: 2018-01-01

## 2018-01-01 RX ORDER — LANOLIN ALCOHOL/MO/W.PET/CERES
200 CREAM (GRAM) TOPICAL DAILY
Status: DISCONTINUED | OUTPATIENT
Start: 2018-01-01 | End: 2018-01-01 | Stop reason: HOSPADM

## 2018-01-01 RX ORDER — POTASSIUM CHLORIDE 7.45 MG/ML
10 INJECTION INTRAVENOUS
Status: DISCONTINUED | OUTPATIENT
Start: 2018-01-01 | End: 2018-01-01 | Stop reason: HOSPADM

## 2018-01-01 RX ORDER — OXYCODONE HYDROCHLORIDE 10 MG/1
10 TABLET ORAL EVERY 4 HOURS PRN
Status: DISCONTINUED | OUTPATIENT
Start: 2018-01-01 | End: 2018-01-01

## 2018-01-01 RX ORDER — DEXTROSE MONOHYDRATE 100 MG/ML
INJECTION, SOLUTION INTRAVENOUS CONTINUOUS
Status: DISCONTINUED | OUTPATIENT
Start: 2018-01-01 | End: 2018-01-01

## 2018-01-01 RX ORDER — ALBUTEROL SULFATE 0.83 MG/ML
2.5 SOLUTION RESPIRATORY (INHALATION) EVERY 4 HOURS PRN
Status: DISCONTINUED | OUTPATIENT
Start: 2018-01-01 | End: 2018-01-01 | Stop reason: HOSPADM

## 2018-01-01 RX ORDER — QUETIAPINE FUMARATE 25 MG/1
25 TABLET, FILM COATED ORAL 2 TIMES DAILY
Qty: 60 TABLET | Refills: 0 | Status: SHIPPED | OUTPATIENT
Start: 2018-01-01 | End: 2018-01-01 | Stop reason: SINTOL

## 2018-01-01 RX ORDER — POTASSIUM CHLORIDE 1.5 G/1.58G
20-40 POWDER, FOR SOLUTION ORAL
Status: DISCONTINUED | OUTPATIENT
Start: 2018-01-01 | End: 2018-01-01 | Stop reason: HOSPADM

## 2018-01-01 RX ORDER — IPRATROPIUM BROMIDE AND ALBUTEROL SULFATE 2.5; .5 MG/3ML; MG/3ML
1 SOLUTION RESPIRATORY (INHALATION) 4 TIMES DAILY
Qty: 360 ML | Refills: 0 | COMMUNITY
Start: 2018-01-01

## 2018-01-01 RX ORDER — NITROGLYCERIN 5 MG/ML
100 VIAL (ML) INTRAVENOUS ONCE
Status: COMPLETED | OUTPATIENT
Start: 2018-01-01 | End: 2018-01-01

## 2018-01-01 RX ORDER — MAGNESIUM SULFATE HEPTAHYDRATE 40 MG/ML
4 INJECTION, SOLUTION INTRAVENOUS EVERY 4 HOURS PRN
Status: DISCONTINUED | OUTPATIENT
Start: 2018-01-01 | End: 2018-01-01 | Stop reason: HOSPADM

## 2018-01-01 RX ORDER — AMOXICILLIN 250 MG
2 CAPSULE ORAL 2 TIMES DAILY
Status: DISCONTINUED | OUTPATIENT
Start: 2018-01-01 | End: 2018-01-01

## 2018-01-01 RX ORDER — SODIUM CHLORIDE, SODIUM LACTATE, POTASSIUM CHLORIDE, CALCIUM CHLORIDE 600; 310; 30; 20 MG/100ML; MG/100ML; MG/100ML; MG/100ML
INJECTION, SOLUTION INTRAVENOUS CONTINUOUS
Status: DISCONTINUED | OUTPATIENT
Start: 2018-01-01 | End: 2018-01-01

## 2018-01-01 RX ORDER — OXYCODONE HYDROCHLORIDE 5 MG/1
5 TABLET ORAL EVERY 4 HOURS PRN
Qty: 120 TABLET | Refills: 0 | Status: SHIPPED | OUTPATIENT
Start: 2018-01-01

## 2018-01-01 RX ORDER — BISACODYL 10 MG
10 SUPPOSITORY, RECTAL RECTAL DAILY PRN
Status: DISCONTINUED | OUTPATIENT
Start: 2018-01-01 | End: 2018-01-01

## 2018-01-01 RX ORDER — IPRATROPIUM BROMIDE AND ALBUTEROL SULFATE 2.5; .5 MG/3ML; MG/3ML
3 SOLUTION RESPIRATORY (INHALATION) EVERY 4 HOURS PRN
Status: DISCONTINUED | OUTPATIENT
Start: 2018-01-01 | End: 2018-01-01

## 2018-01-01 RX ORDER — FUROSEMIDE 10 MG/ML
60 INJECTION INTRAMUSCULAR; INTRAVENOUS ONCE
Status: DISCONTINUED | OUTPATIENT
Start: 2018-01-01 | End: 2018-01-01

## 2018-01-01 RX ORDER — METOPROLOL TARTRATE 1 MG/ML
2.5 INJECTION, SOLUTION INTRAVENOUS EVERY 6 HOURS PRN
Status: DISCONTINUED | OUTPATIENT
Start: 2018-01-01 | End: 2018-01-01

## 2018-01-01 RX ORDER — OXYCODONE HYDROCHLORIDE 10 MG/1
10 TABLET ORAL EVERY 4 HOURS PRN
Status: DISCONTINUED | OUTPATIENT
Start: 2018-01-01 | End: 2018-01-01 | Stop reason: HOSPADM

## 2018-01-01 RX ORDER — IPRATROPIUM BROMIDE AND ALBUTEROL SULFATE 2.5; .5 MG/3ML; MG/3ML
SOLUTION RESPIRATORY (INHALATION)
Status: COMPLETED
Start: 2018-01-01 | End: 2018-01-01

## 2018-01-01 RX ORDER — HEPARIN SODIUM,PORCINE 10 UNIT/ML
5-10 VIAL (ML) INTRAVENOUS EVERY 24 HOURS
Status: DISCONTINUED | OUTPATIENT
Start: 2018-01-01 | End: 2018-01-01 | Stop reason: HOSPADM

## 2018-01-01 RX ORDER — HEPARIN SODIUM 5000 [USP'U]/.5ML
5000 INJECTION, SOLUTION INTRAVENOUS; SUBCUTANEOUS EVERY 8 HOURS
Status: DISCONTINUED | OUTPATIENT
Start: 2018-01-01 | End: 2018-01-01

## 2018-01-01 RX ORDER — LABETALOL HYDROCHLORIDE 5 MG/ML
20 INJECTION, SOLUTION INTRAVENOUS ONCE
Status: DISCONTINUED | OUTPATIENT
Start: 2018-01-01 | End: 2018-01-01

## 2018-01-01 RX ORDER — ONDANSETRON 2 MG/ML
4 INJECTION INTRAMUSCULAR; INTRAVENOUS EVERY 6 HOURS PRN
Status: DISCONTINUED | OUTPATIENT
Start: 2018-01-01 | End: 2018-01-01 | Stop reason: HOSPADM

## 2018-01-01 RX ORDER — DOBUTAMINE HYDROCHLORIDE 200 MG/100ML
10-50 INJECTION INTRAVENOUS CONTINUOUS
Status: ACTIVE | OUTPATIENT
Start: 2018-01-01 | End: 2018-01-01

## 2018-01-01 RX ORDER — POLYETHYLENE GLYCOL 3350 17 G/17G
17 POWDER, FOR SOLUTION ORAL DAILY
Status: DISCONTINUED | OUTPATIENT
Start: 2018-01-01 | End: 2018-01-01 | Stop reason: HOSPADM

## 2018-01-01 RX ORDER — HYDROMORPHONE HYDROCHLORIDE 2 MG/1
2 TABLET ORAL EVERY 4 HOURS PRN
Status: DISCONTINUED | OUTPATIENT
Start: 2018-01-01 | End: 2018-01-01

## 2018-01-01 RX ORDER — QUETIAPINE FUMARATE 25 MG/1
25 TABLET, FILM COATED ORAL 2 TIMES DAILY
Status: DISCONTINUED | OUTPATIENT
Start: 2018-01-01 | End: 2018-01-01 | Stop reason: HOSPADM

## 2018-01-01 RX ORDER — OXYCODONE HYDROCHLORIDE 5 MG/1
5 TABLET ORAL EVERY 4 HOURS PRN
Qty: 120 TABLET | Refills: 0 | Status: CANCELLED | OUTPATIENT
Start: 2018-01-01

## 2018-01-01 RX ORDER — IPRATROPIUM BROMIDE AND ALBUTEROL SULFATE 2.5; .5 MG/3ML; MG/3ML
1 SOLUTION RESPIRATORY (INHALATION) 4 TIMES DAILY
Status: ON HOLD | COMMUNITY
End: 2018-01-01

## 2018-01-01 RX ORDER — LORATADINE 10 MG/1
10 TABLET ORAL DAILY
Qty: 30 TABLET | Refills: 0 | COMMUNITY
Start: 2018-01-01

## 2018-01-01 RX ORDER — IPRATROPIUM BROMIDE AND ALBUTEROL SULFATE 2.5; .5 MG/3ML; MG/3ML
1 SOLUTION RESPIRATORY (INHALATION) EVERY 4 HOURS PRN
Qty: 360 ML | Refills: 0 | Status: SHIPPED | OUTPATIENT
Start: 2018-01-01 | End: 2018-01-01

## 2018-01-01 RX ORDER — AZITHROMYCIN 250 MG/1
TABLET, FILM COATED ORAL
Qty: 6 TABLET | Refills: 0 | Status: SHIPPED | OUTPATIENT
Start: 2018-01-01 | End: 2018-01-01

## 2018-01-01 RX ORDER — DIAZEPAM 10 MG/2ML
5-20 INJECTION, SOLUTION INTRAMUSCULAR; INTRAVENOUS SEE ADMIN INSTRUCTIONS
Status: DISCONTINUED | OUTPATIENT
Start: 2018-01-01 | End: 2018-01-01

## 2018-01-01 RX ORDER — LABETALOL HYDROCHLORIDE 5 MG/ML
20 INJECTION, SOLUTION INTRAVENOUS EVERY 6 HOURS PRN
Status: DISCONTINUED | OUTPATIENT
Start: 2018-01-01 | End: 2018-01-01 | Stop reason: HOSPADM

## 2018-01-01 RX ORDER — LANOLIN ALCOHOL/MO/W.PET/CERES
3 CREAM (GRAM) TOPICAL
Status: DISCONTINUED | OUTPATIENT
Start: 2018-01-01 | End: 2018-01-01 | Stop reason: HOSPADM

## 2018-01-01 RX ORDER — LEVOFLOXACIN 750 MG/1
750 TABLET, FILM COATED ORAL DAILY
Status: DISCONTINUED | OUTPATIENT
Start: 2018-01-01 | End: 2018-01-01

## 2018-01-01 RX ORDER — GADOBUTROL 604.72 MG/ML
7.5 INJECTION INTRAVENOUS ONCE
Status: COMPLETED | OUTPATIENT
Start: 2018-01-01 | End: 2018-01-01

## 2018-01-01 RX ADMIN — Medication 200 MG: at 09:21

## 2018-01-01 RX ADMIN — GADOBUTROL 6 ML: 604.72 INJECTION INTRAVENOUS at 16:56

## 2018-01-01 RX ADMIN — Medication 1 PACKET: at 19:06

## 2018-01-01 RX ADMIN — ALBUTEROL SULFATE 1.25 MG: 2.5 SOLUTION RESPIRATORY (INHALATION) at 22:31

## 2018-01-01 RX ADMIN — IPRATROPIUM BROMIDE AND ALBUTEROL SULFATE 3 ML: .5; 3 SOLUTION RESPIRATORY (INHALATION) at 16:00

## 2018-01-01 RX ADMIN — POTASSIUM PHOSPHATE, MONOBASIC AND POTASSIUM PHOSPHATE, DIBASIC 10 MMOL: 224; 236 INJECTION, SOLUTION INTRAVENOUS at 07:30

## 2018-01-01 RX ADMIN — HYDROMORPHONE HYDROCHLORIDE 2 MG: 2 TABLET ORAL at 20:03

## 2018-01-01 RX ADMIN — PROPOFOL 70 MG: 10 INJECTION, EMULSION INTRAVENOUS at 16:40

## 2018-01-01 RX ADMIN — ONDANSETRON 4 MG: 2 INJECTION INTRAMUSCULAR; INTRAVENOUS at 21:58

## 2018-01-01 RX ADMIN — ALBUTEROL SULFATE 2.5 MG: 2.5 SOLUTION RESPIRATORY (INHALATION) at 01:51

## 2018-01-01 RX ADMIN — POLYETHYLENE GLYCOL 3350 17 G: 17 POWDER, FOR SOLUTION ORAL at 08:44

## 2018-01-01 RX ADMIN — HEPARIN SODIUM 5000 UNITS: 5000 INJECTION, SOLUTION INTRAVENOUS; SUBCUTANEOUS at 01:22

## 2018-01-01 RX ADMIN — MULTIVITAMIN 15 ML: LIQUID ORAL at 08:58

## 2018-01-01 RX ADMIN — HYDROMORPHONE HYDROCHLORIDE 2 MG: 2 TABLET ORAL at 08:50

## 2018-01-01 RX ADMIN — IPRATROPIUM BROMIDE AND ALBUTEROL SULFATE 3 ML: .5; 3 SOLUTION RESPIRATORY (INHALATION) at 19:53

## 2018-01-01 RX ADMIN — HEPARIN SODIUM 5000 UNITS: 5000 INJECTION, SOLUTION INTRAVENOUS; SUBCUTANEOUS at 19:06

## 2018-01-01 RX ADMIN — FOLIC ACID 1 MG: 1 TABLET ORAL at 11:59

## 2018-01-01 RX ADMIN — PROPOFOL 25 MCG/KG/MIN: 10 INJECTION, EMULSION INTRAVENOUS at 14:13

## 2018-01-01 RX ADMIN — ALBUTEROL SULFATE 2.5 MG: 2.5 SOLUTION RESPIRATORY (INHALATION) at 12:26

## 2018-01-01 RX ADMIN — HYDROMORPHONE HYDROCHLORIDE 1 MG: 2 TABLET ORAL at 15:03

## 2018-01-01 RX ADMIN — IPRATROPIUM BROMIDE AND ALBUTEROL SULFATE 3 ML: .5; 3 SOLUTION RESPIRATORY (INHALATION) at 08:32

## 2018-01-01 RX ADMIN — FLUTICASONE FUROATE AND VILANTEROL TRIFENATATE 1 PUFF: 100; 25 POWDER RESPIRATORY (INHALATION) at 09:24

## 2018-01-01 RX ADMIN — THIAMINE HYDROCHLORIDE 250 MG: 100 INJECTION, SOLUTION INTRAMUSCULAR; INTRAVENOUS at 07:30

## 2018-01-01 RX ADMIN — FENTANYL CITRATE 100 MCG: 50 INJECTION INTRAMUSCULAR; INTRAVENOUS at 10:49

## 2018-01-01 RX ADMIN — HYDROMORPHONE HYDROCHLORIDE 0.2 MG: 1 INJECTION, SOLUTION INTRAMUSCULAR; INTRAVENOUS; SUBCUTANEOUS at 13:56

## 2018-01-01 RX ADMIN — DIAZEPAM 5 MG: 5 INJECTION, SOLUTION INTRAMUSCULAR; INTRAVENOUS at 00:43

## 2018-01-01 RX ADMIN — POLYETHYLENE GLYCOL 3350 17 G: 17 POWDER, FOR SOLUTION ORAL at 21:07

## 2018-01-01 RX ADMIN — HYDROMORPHONE HYDROCHLORIDE 2 MG: 2 TABLET ORAL at 03:50

## 2018-01-01 RX ADMIN — HYDROMORPHONE HYDROCHLORIDE 2 MG: 2 TABLET ORAL at 16:22

## 2018-01-01 RX ADMIN — IPRATROPIUM BROMIDE AND ALBUTEROL SULFATE 3 ML: .5; 3 SOLUTION RESPIRATORY (INHALATION) at 12:22

## 2018-01-01 RX ADMIN — DEXTROSE MONOHYDRATE 25 G: 25 INJECTION, SOLUTION INTRAVENOUS at 08:25

## 2018-01-01 RX ADMIN — IPRATROPIUM BROMIDE AND ALBUTEROL SULFATE 3 ML: .5; 3 SOLUTION RESPIRATORY (INHALATION) at 15:29

## 2018-01-01 RX ADMIN — Medication 75 MCG/HR: at 04:24

## 2018-01-01 RX ADMIN — IPRATROPIUM BROMIDE AND ALBUTEROL SULFATE 3 ML: .5; 3 SOLUTION RESPIRATORY (INHALATION) at 08:02

## 2018-01-01 RX ADMIN — PROPOFOL 30 MCG/KG/MIN: 10 INJECTION, EMULSION INTRAVENOUS at 17:10

## 2018-01-01 RX ADMIN — HYDROMORPHONE HYDROCHLORIDE 0.5 MG: 1 INJECTION, SOLUTION INTRAMUSCULAR; INTRAVENOUS; SUBCUTANEOUS at 09:51

## 2018-01-01 RX ADMIN — SODIUM CHLORIDE: 9 INJECTION, SOLUTION INTRAVENOUS at 05:26

## 2018-01-01 RX ADMIN — ALBUTEROL SULFATE 1.25 MG: 2.5 SOLUTION RESPIRATORY (INHALATION) at 01:03

## 2018-01-01 RX ADMIN — PROPOFOL 25 MCG/KG/MIN: 10 INJECTION, EMULSION INTRAVENOUS at 08:10

## 2018-01-01 RX ADMIN — IPRATROPIUM BROMIDE AND ALBUTEROL SULFATE 3 ML: .5; 3 SOLUTION RESPIRATORY (INHALATION) at 20:22

## 2018-01-01 RX ADMIN — HUMAN ALBUMIN MICROSPHERES AND PERFLUTREN 9 ML: 10; .22 INJECTION, SOLUTION INTRAVENOUS at 13:33

## 2018-01-01 RX ADMIN — SENNOSIDES AND DOCUSATE SODIUM 2 TABLET: 8.6; 5 TABLET ORAL at 21:08

## 2018-01-01 RX ADMIN — IPRATROPIUM BROMIDE AND ALBUTEROL SULFATE 3 ML: .5; 3 SOLUTION RESPIRATORY (INHALATION) at 16:11

## 2018-01-01 RX ADMIN — SENNOSIDES AND DOCUSATE SODIUM 1 TABLET: 8.6; 5 TABLET ORAL at 08:43

## 2018-01-01 RX ADMIN — PHENYLEPHRINE HYDROCHLORIDE 100 MCG: 10 INJECTION, SOLUTION INTRAMUSCULAR; INTRAVENOUS; SUBCUTANEOUS at 16:45

## 2018-01-01 RX ADMIN — HYDROMORPHONE HYDROCHLORIDE 2 MG: 2 TABLET ORAL at 12:27

## 2018-01-01 RX ADMIN — Medication 0.5 MG: at 11:29

## 2018-01-01 RX ADMIN — THIAMINE HYDROCHLORIDE 250 MG: 100 INJECTION, SOLUTION INTRAMUSCULAR; INTRAVENOUS at 09:28

## 2018-01-01 RX ADMIN — IODIXANOL 60 ML: 320 INJECTION, SOLUTION INTRAVASCULAR at 19:14

## 2018-01-01 RX ADMIN — ALBUTEROL SULFATE 2.5 MG: 2.5 SOLUTION RESPIRATORY (INHALATION) at 22:52

## 2018-01-01 RX ADMIN — QUETIAPINE 25 MG: 25 TABLET ORAL at 08:56

## 2018-01-01 RX ADMIN — Medication 100 MCG/HR: at 22:39

## 2018-01-01 RX ADMIN — SODIUM CHLORIDE, POTASSIUM CHLORIDE, SODIUM LACTATE AND CALCIUM CHLORIDE: 600; 310; 30; 20 INJECTION, SOLUTION INTRAVENOUS at 22:02

## 2018-01-01 RX ADMIN — MELATONIN TAB 3 MG 3 MG: 3 TAB at 01:22

## 2018-01-01 RX ADMIN — AMOXICILLIN AND CLAVULANATE POTASSIUM 1 TABLET: 875; 125 TABLET, FILM COATED ORAL at 13:26

## 2018-01-01 RX ADMIN — HYDROMORPHONE HYDROCHLORIDE 2 MG: 2 TABLET ORAL at 09:08

## 2018-01-01 RX ADMIN — ATROPINE SULFATE 0.2 MG: 0.4 INJECTION, SOLUTION INTRAMUSCULAR; INTRAVENOUS; SUBCUTANEOUS at 13:26

## 2018-01-01 RX ADMIN — FOLIC ACID 1 MG: 1 TABLET ORAL at 08:57

## 2018-01-01 RX ADMIN — Medication 1 PACKET: at 09:26

## 2018-01-01 RX ADMIN — AMOXICILLIN AND CLAVULANATE POTASSIUM 1 TABLET: 875; 125 TABLET, FILM COATED ORAL at 20:54

## 2018-01-01 RX ADMIN — DOBUTAMINE IN DEXTROSE 20 MCG/KG/MIN: 200 INJECTION, SOLUTION INTRAVENOUS at 13:20

## 2018-01-01 RX ADMIN — FAMOTIDINE 20 MG: 10 INJECTION, SOLUTION INTRAVENOUS at 13:23

## 2018-01-01 RX ADMIN — LIDOCAINE HYDROCHLORIDE 2 ML: 10 INJECTION, SOLUTION EPIDURAL; INFILTRATION; INTRACAUDAL; PERINEURAL at 10:59

## 2018-01-01 RX ADMIN — IPRATROPIUM BROMIDE AND ALBUTEROL SULFATE 3 ML: .5; 3 SOLUTION RESPIRATORY (INHALATION) at 15:58

## 2018-01-01 RX ADMIN — IPRATROPIUM BROMIDE AND ALBUTEROL SULFATE 3 ML: .5; 3 SOLUTION RESPIRATORY (INHALATION) at 09:08

## 2018-01-01 RX ADMIN — HYDROMORPHONE HYDROCHLORIDE 2 MG: 2 TABLET ORAL at 21:44

## 2018-01-01 RX ADMIN — HUMAN INSULIN 6.5 UNITS: 100 INJECTION, SOLUTION SUBCUTANEOUS at 08:25

## 2018-01-01 RX ADMIN — MULTIVITAMIN 15 ML: LIQUID ORAL at 09:24

## 2018-01-01 RX ADMIN — IPRATROPIUM BROMIDE AND ALBUTEROL SULFATE 3 ML: .5; 3 SOLUTION RESPIRATORY (INHALATION) at 20:32

## 2018-01-01 RX ADMIN — IPRATROPIUM BROMIDE AND ALBUTEROL SULFATE 3 ML: .5; 3 SOLUTION RESPIRATORY (INHALATION) at 12:53

## 2018-01-01 RX ADMIN — HYDROMORPHONE HYDROCHLORIDE 2 MG: 2 TABLET ORAL at 17:22

## 2018-01-01 RX ADMIN — IPRATROPIUM BROMIDE AND ALBUTEROL SULFATE 3 ML: .5; 3 SOLUTION RESPIRATORY (INHALATION) at 12:56

## 2018-01-01 RX ADMIN — HEPARIN SODIUM 5000 UNITS: 5000 INJECTION, SOLUTION INTRAVENOUS; SUBCUTANEOUS at 10:15

## 2018-01-01 RX ADMIN — ALBUTEROL SULFATE 2.5 MG: 2.5 SOLUTION RESPIRATORY (INHALATION) at 16:06

## 2018-01-01 RX ADMIN — SODIUM CHLORIDE: 9 INJECTION, SOLUTION INTRAVENOUS at 08:27

## 2018-01-01 RX ADMIN — FUROSEMIDE 60 MG: 10 INJECTION, SOLUTION INTRAVENOUS at 10:52

## 2018-01-01 RX ADMIN — LIDOCAINE HYDROCHLORIDE 5 ML: 10 INJECTION, SOLUTION EPIDURAL; INFILTRATION; INTRACAUDAL; PERINEURAL at 19:04

## 2018-01-01 RX ADMIN — QUETIAPINE 25 MG: 25 TABLET ORAL at 08:43

## 2018-01-01 RX ADMIN — PROPOFOL 35 MCG/KG/MIN: 10 INJECTION, EMULSION INTRAVENOUS at 20:52

## 2018-01-01 RX ADMIN — MIDAZOLAM 2 MG: 1 INJECTION INTRAMUSCULAR; INTRAVENOUS at 10:49

## 2018-01-01 RX ADMIN — AMOXICILLIN AND CLAVULANATE POTASSIUM 1 TABLET: 875; 125 TABLET, FILM COATED ORAL at 08:07

## 2018-01-01 RX ADMIN — IODIXANOL 100 ML: 320 INJECTION, SOLUTION INTRAVASCULAR at 20:30

## 2018-01-01 RX ADMIN — MIDAZOLAM 5 MG: 1 INJECTION INTRAMUSCULAR; INTRAVENOUS at 18:16

## 2018-01-01 RX ADMIN — IPRATROPIUM BROMIDE AND ALBUTEROL SULFATE 3 ML: .5; 3 SOLUTION RESPIRATORY (INHALATION) at 12:50

## 2018-01-01 RX ADMIN — HEPARIN SODIUM 5000 UNITS: 5000 INJECTION, SOLUTION INTRAVENOUS; SUBCUTANEOUS at 11:18

## 2018-01-01 RX ADMIN — ALBUTEROL SULFATE 2.5 MG: 2.5 SOLUTION RESPIRATORY (INHALATION) at 09:16

## 2018-01-01 RX ADMIN — QUETIAPINE 25 MG: 25 TABLET ORAL at 21:38

## 2018-01-01 RX ADMIN — HYDROMORPHONE HYDROCHLORIDE 2 MG: 2 TABLET ORAL at 12:52

## 2018-01-01 RX ADMIN — IPRATROPIUM BROMIDE AND ALBUTEROL SULFATE 3 ML: .5; 3 SOLUTION RESPIRATORY (INHALATION) at 09:12

## 2018-01-01 RX ADMIN — IOPAMIDOL 100 ML: 755 INJECTION, SOLUTION INTRAVENOUS at 16:43

## 2018-01-01 RX ADMIN — SODIUM CHLORIDE, PRESERVATIVE FREE 77 ML: 5 INJECTION INTRAVENOUS at 16:43

## 2018-01-01 RX ADMIN — ALBUTEROL SULFATE 2.5 MG: 2.5 SOLUTION RESPIRATORY (INHALATION) at 00:42

## 2018-01-01 RX ADMIN — IPRATROPIUM BROMIDE AND ALBUTEROL SULFATE 3 ML: .5; 3 SOLUTION RESPIRATORY (INHALATION) at 12:45

## 2018-01-01 RX ADMIN — DEXTROSE AND SODIUM CHLORIDE: 5; 450 INJECTION, SOLUTION INTRAVENOUS at 20:57

## 2018-01-01 RX ADMIN — IPRATROPIUM BROMIDE AND ALBUTEROL SULFATE 3 ML: .5; 3 SOLUTION RESPIRATORY (INHALATION) at 21:43

## 2018-01-01 RX ADMIN — MIDAZOLAM 1 MG: 1 INJECTION INTRAMUSCULAR; INTRAVENOUS at 18:30

## 2018-01-01 RX ADMIN — Medication 75 MCG/HR: at 01:11

## 2018-01-01 RX ADMIN — PROPOFOL 25 MCG/KG/MIN: 10 INJECTION, EMULSION INTRAVENOUS at 05:40

## 2018-01-01 RX ADMIN — IPRATROPIUM BROMIDE AND ALBUTEROL SULFATE 3 ML: .5; 3 SOLUTION RESPIRATORY (INHALATION) at 16:07

## 2018-01-01 RX ADMIN — IPRATROPIUM BROMIDE AND ALBUTEROL SULFATE 3 ML: .5; 3 SOLUTION RESPIRATORY (INHALATION) at 08:45

## 2018-01-01 RX ADMIN — QUETIAPINE 25 MG: 25 TABLET ORAL at 11:59

## 2018-01-01 RX ADMIN — FOLIC ACID 1 MG: 1 TABLET ORAL at 10:28

## 2018-01-01 RX ADMIN — ALBUTEROL SULFATE 2.5 MG: 2.5 SOLUTION RESPIRATORY (INHALATION) at 07:55

## 2018-01-01 RX ADMIN — HYDROMORPHONE HYDROCHLORIDE 2 MG: 2 TABLET ORAL at 23:47

## 2018-01-01 RX ADMIN — PHENYLEPHRINE HYDROCHLORIDE 100 MCG: 10 INJECTION, SOLUTION INTRAMUSCULAR; INTRAVENOUS; SUBCUTANEOUS at 16:43

## 2018-01-01 RX ADMIN — ROCURONIUM BROMIDE 100 MG: 10 INJECTION INTRAVENOUS at 16:40

## 2018-01-01 RX ADMIN — FENTANYL CITRATE 250 MCG: 50 INJECTION, SOLUTION INTRAMUSCULAR; INTRAVENOUS at 18:16

## 2018-01-01 RX ADMIN — THIAMINE HYDROCHLORIDE 250 MG: 100 INJECTION, SOLUTION INTRAMUSCULAR; INTRAVENOUS at 08:57

## 2018-01-01 RX ADMIN — MIDAZOLAM HYDROCHLORIDE 0.5 MG: 1 INJECTION, SOLUTION INTRAMUSCULAR; INTRAVENOUS at 19:04

## 2018-01-01 RX ADMIN — QUETIAPINE 25 MG: 25 TABLET ORAL at 08:57

## 2018-01-01 RX ADMIN — MULTIVITAMIN 15 ML: LIQUID ORAL at 15:42

## 2018-01-01 RX ADMIN — IPRATROPIUM BROMIDE AND ALBUTEROL SULFATE 3 ML: .5; 3 SOLUTION RESPIRATORY (INHALATION) at 19:44

## 2018-01-01 RX ADMIN — ALBUTEROL SULFATE 2.5 MG: 2.5 SOLUTION RESPIRATORY (INHALATION) at 12:38

## 2018-01-01 RX ADMIN — FLUTICASONE FUROATE AND VILANTEROL TRIFENATATE 1 PUFF: 100; 25 POWDER RESPIRATORY (INHALATION) at 08:21

## 2018-01-01 RX ADMIN — Medication 200 MG: at 08:56

## 2018-01-01 RX ADMIN — LIDOCAINE HYDROCHLORIDE 8 ML: 10 INJECTION, SOLUTION EPIDURAL; INFILTRATION; INTRACAUDAL; PERINEURAL at 10:49

## 2018-01-01 RX ADMIN — HEPARIN SODIUM 5000 UNITS: 5000 INJECTION, SOLUTION INTRAVENOUS; SUBCUTANEOUS at 17:58

## 2018-01-01 RX ADMIN — FENTANYL CITRATE 25 MCG: 50 INJECTION, SOLUTION INTRAMUSCULAR; INTRAVENOUS at 19:04

## 2018-01-01 RX ADMIN — IPRATROPIUM BROMIDE AND ALBUTEROL SULFATE 3 ML: .5; 3 SOLUTION RESPIRATORY (INHALATION) at 08:31

## 2018-01-01 RX ADMIN — FOLIC ACID 1 MG: 1 TABLET ORAL at 08:56

## 2018-01-01 RX ADMIN — IPRATROPIUM BROMIDE AND ALBUTEROL SULFATE 3 ML: .5; 3 SOLUTION RESPIRATORY (INHALATION) at 16:04

## 2018-01-01 RX ADMIN — FOLIC ACID 1 MG: 1 TABLET ORAL at 08:43

## 2018-01-01 RX ADMIN — SODIUM CHLORIDE, PRESERVATIVE FREE 5 ML: 5 INJECTION INTRAVENOUS at 06:24

## 2018-01-01 RX ADMIN — QUETIAPINE 25 MG: 25 TABLET ORAL at 09:23

## 2018-01-01 RX ADMIN — THIAMINE HYDROCHLORIDE 250 MG: 100 INJECTION, SOLUTION INTRAMUSCULAR; INTRAVENOUS at 08:41

## 2018-01-01 RX ADMIN — ALBUTEROL SULFATE 1.25 MG: 2.5 SOLUTION RESPIRATORY (INHALATION) at 12:00

## 2018-01-01 RX ADMIN — DEXTROSE AND SODIUM CHLORIDE: 5; 450 INJECTION, SOLUTION INTRAVENOUS at 09:51

## 2018-01-01 RX ADMIN — SODIUM CHLORIDE, PRESERVATIVE FREE 10 ML: 5 INJECTION INTRAVENOUS at 10:28

## 2018-01-01 RX ADMIN — FLUTICASONE FUROATE AND VILANTEROL TRIFENATATE 1 PUFF: 100; 25 POWDER RESPIRATORY (INHALATION) at 10:28

## 2018-01-01 RX ADMIN — HEPARIN SODIUM 5000 UNITS: 5000 INJECTION, SOLUTION INTRAVENOUS; SUBCUTANEOUS at 03:06

## 2018-01-01 RX ADMIN — HYDROMORPHONE HYDROCHLORIDE 2 MG: 2 TABLET ORAL at 04:51

## 2018-01-01 RX ADMIN — FUROSEMIDE 40 MG: 10 INJECTION, SOLUTION INTRAVENOUS at 17:20

## 2018-01-01 RX ADMIN — QUETIAPINE 25 MG: 25 TABLET ORAL at 21:11

## 2018-01-01 RX ADMIN — IPRATROPIUM BROMIDE AND ALBUTEROL SULFATE 3 ML: .5; 3 SOLUTION RESPIRATORY (INHALATION) at 20:30

## 2018-01-01 RX ADMIN — FUROSEMIDE 40 MG: 10 INJECTION, SOLUTION INTRAVENOUS at 10:22

## 2018-01-01 RX ADMIN — HYDROMORPHONE HYDROCHLORIDE 2 MG: 2 TABLET ORAL at 22:32

## 2018-01-01 RX ADMIN — HYDROMORPHONE HYDROCHLORIDE 0.3 MG: 1 INJECTION, SOLUTION INTRAMUSCULAR; INTRAVENOUS; SUBCUTANEOUS at 01:48

## 2018-01-01 RX ADMIN — METOPROLOL TARTRATE 3 MG: 5 INJECTION INTRAVENOUS at 13:31

## 2018-01-01 RX ADMIN — PANTOPRAZOLE SODIUM 40 MG: 40 TABLET, DELAYED RELEASE ORAL at 08:45

## 2018-01-01 RX ADMIN — FLUTICASONE FUROATE AND VILANTEROL TRIFENATATE 1 PUFF: 100; 25 POWDER RESPIRATORY (INHALATION) at 08:58

## 2018-01-01 RX ADMIN — FOLIC ACID 1 MG: 1 TABLET ORAL at 09:23

## 2018-01-01 RX ADMIN — Medication 50 MCG/HR: at 17:48

## 2018-01-01 RX ADMIN — IOPAMIDOL 91 ML: 755 INJECTION, SOLUTION INTRAVASCULAR at 15:24

## 2018-01-01 RX ADMIN — SODIUM CHLORIDE: 9 INJECTION, SOLUTION INTRAVENOUS at 09:57

## 2018-01-01 RX ADMIN — NITROGLYCERIN 100 MCG: 5 INJECTION, SOLUTION INTRAVENOUS at 19:41

## 2018-01-01 RX ADMIN — IPRATROPIUM BROMIDE AND ALBUTEROL SULFATE 3 ML: .5; 3 SOLUTION RESPIRATORY (INHALATION) at 20:03

## 2018-01-01 RX ADMIN — HYDROMORPHONE HYDROCHLORIDE 1 MG: 2 TABLET ORAL at 20:54

## 2018-01-01 RX ADMIN — QUETIAPINE 25 MG: 25 TABLET ORAL at 21:27

## 2018-01-01 RX ADMIN — HYDROMORPHONE HYDROCHLORIDE 2 MG: 2 TABLET ORAL at 09:58

## 2018-01-01 RX ADMIN — Medication 1 PACKET: at 15:42

## 2018-01-01 RX ADMIN — Medication 1 PACKET: at 09:01

## 2018-01-01 RX ADMIN — QUETIAPINE 25 MG: 25 TABLET ORAL at 20:07

## 2018-01-01 RX ADMIN — ALBUTEROL SULFATE 2.5 MG: 2.5 SOLUTION RESPIRATORY (INHALATION) at 16:20

## 2018-01-01 RX ADMIN — IPRATROPIUM BROMIDE AND ALBUTEROL SULFATE 3 ML: .5; 3 SOLUTION RESPIRATORY (INHALATION) at 16:19

## 2018-01-01 RX ADMIN — SODIUM CHLORIDE: 9 INJECTION, SOLUTION INTRAVENOUS at 01:17

## 2018-01-01 RX ADMIN — DEXTROSE AND SODIUM CHLORIDE: 5; 450 INJECTION, SOLUTION INTRAVENOUS at 16:38

## 2018-01-01 RX ADMIN — POTASSIUM PHOSPHATE, MONOBASIC AND POTASSIUM PHOSPHATE, DIBASIC 20 MMOL: 224; 236 INJECTION, SOLUTION INTRAVENOUS at 08:04

## 2018-01-01 RX ADMIN — HYDROMORPHONE HYDROCHLORIDE 0.5 MG: 1 INJECTION, SOLUTION INTRAMUSCULAR; INTRAVENOUS; SUBCUTANEOUS at 04:19

## 2018-01-01 RX ADMIN — HYDROMORPHONE HYDROCHLORIDE 0.5 MG: 1 INJECTION, SOLUTION INTRAMUSCULAR; INTRAVENOUS; SUBCUTANEOUS at 13:59

## 2018-01-01 RX ADMIN — ALBUTEROL SULFATE 2.5 MG: 2.5 SOLUTION RESPIRATORY (INHALATION) at 08:16

## 2018-01-01 RX ADMIN — IPRATROPIUM BROMIDE AND ALBUTEROL SULFATE 3 ML: .5; 3 SOLUTION RESPIRATORY (INHALATION) at 07:50

## 2018-01-01 RX ADMIN — PANTOPRAZOLE SODIUM 40 MG: 40 TABLET, DELAYED RELEASE ORAL at 08:57

## 2018-01-01 RX ADMIN — IOPAMIDOL 100 ML: 755 INJECTION, SOLUTION INTRAVENOUS at 15:19

## 2018-01-01 RX ADMIN — HYDROMORPHONE HYDROCHLORIDE 0.2 MG: 1 INJECTION, SOLUTION INTRAMUSCULAR; INTRAVENOUS; SUBCUTANEOUS at 22:07

## 2018-01-01 RX ADMIN — QUETIAPINE 25 MG: 25 TABLET ORAL at 19:57

## 2018-01-01 RX ADMIN — IPRATROPIUM BROMIDE AND ALBUTEROL SULFATE 3 ML: .5; 3 SOLUTION RESPIRATORY (INHALATION) at 22:30

## 2018-01-01 RX ADMIN — ALBUTEROL SULFATE 2.5 MG: 2.5 SOLUTION RESPIRATORY (INHALATION) at 21:14

## 2018-01-01 RX ADMIN — ALBUTEROL SULFATE 1.25 MG: 2.5 SOLUTION RESPIRATORY (INHALATION) at 08:18

## 2018-01-01 RX ADMIN — SODIUM CHLORIDE, POTASSIUM CHLORIDE, SODIUM LACTATE AND CALCIUM CHLORIDE 1000 ML: 600; 310; 30; 20 INJECTION, SOLUTION INTRAVENOUS at 23:15

## 2018-01-01 RX ADMIN — MIDAZOLAM 1 MG: 1 INJECTION INTRAMUSCULAR; INTRAVENOUS at 19:18

## 2018-01-01 RX ADMIN — OXYCODONE HYDROCHLORIDE 10 MG: 10 TABLET ORAL at 09:28

## 2018-01-01 RX ADMIN — MIDAZOLAM 1 MG: 1 INJECTION INTRAMUSCULAR; INTRAVENOUS at 20:26

## 2018-01-01 RX ADMIN — IPRATROPIUM BROMIDE AND ALBUTEROL SULFATE 3 ML: .5; 3 SOLUTION RESPIRATORY (INHALATION) at 08:56

## 2018-01-01 RX ADMIN — SODIUM CHLORIDE, PRESERVATIVE FREE 5 ML: 5 INJECTION INTRAVENOUS at 06:27

## 2018-01-01 RX ADMIN — IPRATROPIUM BROMIDE AND ALBUTEROL SULFATE 3 ML: .5; 3 SOLUTION RESPIRATORY (INHALATION) at 20:12

## 2018-01-01 RX ADMIN — Medication 200 MG: at 10:28

## 2018-01-01 RX ADMIN — SODIUM CHLORIDE: 9 INJECTION, SOLUTION INTRAVENOUS at 15:53

## 2018-01-01 RX ADMIN — QUETIAPINE 25 MG: 25 TABLET ORAL at 10:28

## 2018-01-01 RX ADMIN — FLUTICASONE FUROATE AND VILANTEROL TRIFENATATE 1 PUFF: 100; 25 POWDER RESPIRATORY (INHALATION) at 09:20

## 2018-01-01 RX ADMIN — FENTANYL CITRATE 75 MCG: 50 INJECTION INTRAMUSCULAR; INTRAVENOUS at 13:01

## 2018-01-01 RX ADMIN — LIDOCAINE HYDROCHLORIDE 2 ML: 20 SOLUTION ORAL; TOPICAL at 10:55

## 2018-01-01 RX ADMIN — PANTOPRAZOLE SODIUM 40 MG: 40 TABLET, DELAYED RELEASE ORAL at 08:43

## 2018-01-01 RX ADMIN — PROPOFOL 25 MCG/KG/MIN: 10 INJECTION, EMULSION INTRAVENOUS at 13:39

## 2018-01-01 RX ADMIN — IPRATROPIUM BROMIDE AND ALBUTEROL SULFATE 3 ML: .5; 3 SOLUTION RESPIRATORY (INHALATION) at 12:15

## 2018-01-01 RX ADMIN — PROPOFOL 25 MCG/KG/MIN: 10 INJECTION, EMULSION INTRAVENOUS at 23:03

## 2018-01-01 RX ADMIN — HYDROMORPHONE HYDROCHLORIDE 2 MG: 2 TABLET ORAL at 08:09

## 2018-01-01 RX ADMIN — MIDAZOLAM HYDROCHLORIDE 1.5 MG: 1 INJECTION, SOLUTION INTRAMUSCULAR; INTRAVENOUS at 13:02

## 2018-01-01 RX ADMIN — ALBUTEROL SULFATE 2.5 MG: 2.5 SOLUTION RESPIRATORY (INHALATION) at 23:58

## 2018-01-01 RX ADMIN — LIDOCAINE HYDROCHLORIDE 10 ML: 10 INJECTION, SOLUTION EPIDURAL; INFILTRATION; INTRACAUDAL; PERINEURAL at 12:48

## 2018-01-01 RX ADMIN — HYDROMORPHONE HYDROCHLORIDE 0.5 MG: 1 INJECTION, SOLUTION INTRAMUSCULAR; INTRAVENOUS; SUBCUTANEOUS at 06:14

## 2018-01-01 RX ADMIN — ALBUTEROL SULFATE 2.5 MG: 2.5 SOLUTION RESPIRATORY (INHALATION) at 16:08

## 2018-01-01 RX ADMIN — IPRATROPIUM BROMIDE AND ALBUTEROL SULFATE 3 ML: .5; 3 SOLUTION RESPIRATORY (INHALATION) at 12:27

## 2018-01-01 RX ADMIN — PROPOFOL 30 MG: 10 INJECTION, EMULSION INTRAVENOUS at 16:43

## 2018-01-01 RX ADMIN — POTASSIUM PHOSPHATE, MONOBASIC AND POTASSIUM PHOSPHATE, DIBASIC 20 MMOL: 224; 236 INJECTION, SOLUTION INTRAVENOUS at 14:28

## 2018-01-01 RX ADMIN — HYDROMORPHONE HYDROCHLORIDE 0.5 MG: 1 INJECTION, SOLUTION INTRAMUSCULAR; INTRAVENOUS; SUBCUTANEOUS at 08:21

## 2018-01-01 RX ADMIN — ALBUTEROL SULFATE 2.5 MG: 2.5 SOLUTION RESPIRATORY (INHALATION) at 20:31

## 2018-01-01 RX ADMIN — SODIUM CHLORIDE, PRESERVATIVE FREE 5 ML: 5 INJECTION INTRAVENOUS at 09:38

## 2018-01-01 RX ADMIN — SODIUM CHLORIDE: 9 INJECTION, SOLUTION INTRAVENOUS at 23:39

## 2018-01-01 RX ADMIN — SODIUM CHLORIDE: 9 INJECTION, SOLUTION INTRAVENOUS at 21:47

## 2018-01-01 RX ADMIN — QUETIAPINE 25 MG: 25 TABLET ORAL at 09:21

## 2018-01-01 RX ADMIN — Medication 0.2 MG: at 22:07

## 2018-01-01 RX ADMIN — IODIXANOL 100 ML: 320 INJECTION, SOLUTION INTRAVASCULAR at 18:02

## 2018-01-01 RX ADMIN — Medication 1 PACKET: at 21:38

## 2018-01-01 RX ADMIN — Medication 1 PACKET: at 19:58

## 2018-01-01 RX ADMIN — QUETIAPINE 25 MG: 25 TABLET ORAL at 21:08

## 2018-01-01 RX ADMIN — FAMOTIDINE 20 MG: 10 INJECTION, SOLUTION INTRAVENOUS at 23:15

## 2018-01-01 RX ADMIN — THIAMINE HYDROCHLORIDE 250 MG: 100 INJECTION, SOLUTION INTRAMUSCULAR; INTRAVENOUS at 12:00

## 2018-01-01 RX ADMIN — FLUTICASONE FUROATE AND VILANTEROL TRIFENATATE 1 PUFF: 200; 25 POWDER RESPIRATORY (INHALATION) at 09:08

## 2018-01-01 RX ADMIN — IPRATROPIUM BROMIDE AND ALBUTEROL SULFATE 3 ML: .5; 3 SOLUTION RESPIRATORY (INHALATION) at 12:26

## 2018-01-01 RX ADMIN — DEXTROSE AND SODIUM CHLORIDE: 5; 450 INJECTION, SOLUTION INTRAVENOUS at 11:18

## 2018-01-01 RX ADMIN — Medication 100 MCG/HR: at 13:43

## 2018-01-01 RX ADMIN — FOLIC ACID 1 MG: 1 TABLET ORAL at 09:21

## 2018-01-01 ASSESSMENT — PAIN DESCRIPTION - DESCRIPTORS
DESCRIPTORS: SHARP;CONSTANT
DESCRIPTORS: ACHING
DESCRIPTORS: DISCOMFORT
DESCRIPTORS: ACHING
DESCRIPTORS: ACHING
DESCRIPTORS: SHARP
DESCRIPTORS: DISCOMFORT
DESCRIPTORS: ACHING
DESCRIPTORS: ACHING
DESCRIPTORS: DISCOMFORT
DESCRIPTORS: ACHING
DESCRIPTORS: DISCOMFORT

## 2018-01-01 ASSESSMENT — ACTIVITIES OF DAILY LIVING (ADL)
ADLS_ACUITY_SCORE: 10
DRESS: 0-->INDEPENDENT
TOILETING: 0-->INDEPENDENT
ADLS_ACUITY_SCORE: 10
ADLS_ACUITY_SCORE: 10
ADLS_ACUITY_SCORE: 9
ADLS_ACUITY_SCORE: 10
ADLS_ACUITY_SCORE: 10
SWALLOWING: 0-->SWALLOWS FOODS/LIQUIDS WITHOUT DIFFICULTY
ADLS_ACUITY_SCORE: 10
ADLS_ACUITY_SCORE: 10
ADLS_ACUITY_SCORE: 9
AMBULATION: 0-->INDEPENDENT
ADLS_ACUITY_SCORE: 10
ADLS_ACUITY_SCORE: 10
ADLS_ACUITY_SCORE: 9
ADLS_ACUITY_SCORE: 10
TRANSFERRING: 0-->INDEPENDENT
ADLS_ACUITY_SCORE: 10
PREVIOUS_RESPONSIBILITIES: MEAL PREP;HOUSEKEEPING;LAUNDRY;SHOPPING;YARDWORK;MEDICATION MANAGEMENT;FINANCES;DRIVING;WORK;CHILD CARE
ADLS_ACUITY_SCORE: 10
BATHING: 0-->INDEPENDENT
ADLS_ACUITY_SCORE: 9
ADLS_ACUITY_SCORE: 10
ADLS_ACUITY_SCORE: 10
ADLS_ACUITY_SCORE: 9
ADLS_ACUITY_SCORE: 9
ADLS_ACUITY_SCORE: 10
ADLS_ACUITY_SCORE: 9
ADLS_ACUITY_SCORE: 10
COGNITION: 0 - NO COGNITION ISSUES REPORTED
ADLS_ACUITY_SCORE: 10
ADLS_ACUITY_SCORE: 10
RETIRED_EATING: 0-->INDEPENDENT
RETIRED_COMMUNICATION: 0-->UNDERSTANDS/COMMUNICATES WITHOUT DIFFICULTY
ADLS_ACUITY_SCORE: 9
FALL_HISTORY_WITHIN_LAST_SIX_MONTHS: NO
ADLS_ACUITY_SCORE: 10

## 2018-01-01 ASSESSMENT — PAIN SCALES - GENERAL
PAINLEVEL: NO PAIN (0)
PAINLEVEL: MILD PAIN (3)
PAINLEVEL: NO PAIN (0)
PAINLEVEL: MILD PAIN (2)

## 2018-01-01 ASSESSMENT — PATIENT HEALTH QUESTIONNAIRE - PHQ9
SUM OF ALL RESPONSES TO PHQ QUESTIONS 1-9: 4
10. IF YOU CHECKED OFF ANY PROBLEMS, HOW DIFFICULT HAVE THESE PROBLEMS MADE IT FOR YOU TO DO YOUR WORK, TAKE CARE OF THINGS AT HOME, OR GET ALONG WITH OTHER PEOPLE: NOT DIFFICULT AT ALL

## 2018-01-28 PROBLEM — K76.9 LIVER LESION: Status: ACTIVE | Noted: 2018-01-01

## 2018-01-28 NOTE — IP AVS SNAPSHOT
MRN:[id number]                      After Visit Summary   1/28/2018    Naseem Ferrer    MRN: [id number]           Thank you!     Thank you for choosing [facility] for your care. Our goal is always to provide you with excellent care. Hearing back from our patients is one way we can continue to improve our services. Please take a few minutes to complete the written survey that you may receive in the mail after you visit with us. Thank you!        Patient Information     Date Of Birth          1955        Designated Caregiver       Most Recent Value    Caregiver    Will someone help with your care after discharge? no      About your hospital stay     You were admitted on:  January 28, 2018 You last received care in the:  [address] [facility]    You were discharged on:  February 5, 2018        Reason for your hospital stay       You were hospitalized for a bleed in your liver lesion. No biopsy has been obtained yet, so this needs to be done in an outpatient setting.                  Who to Call     For medical emergencies, please call 911.  For non-urgent questions about your medical care, please call your primary care provider or clinic, [phone]          Attending Provider     Provider Specialty    Antoine Oro MD General Surgery    Golden Can MD Internal Medicine       Primary Care Provider Office Phone # Fax #    Jonna Clements [phone]       When to contact your care team       Call your PCP or return to ED for temperatures > 100.7 degrees, worsening or changing pain, uncontrolled vomiting or inability to tolerate oral intake, new or worsening diarrhea, new or worsening shortness of breath, decreased urine output, yellowing of the eyes or skin, confusion, weakness, blood in urine or stools.                  After Care Instructions     Activity       Your activity upon discharge: activity as tolerated and no driving while on analgesics             Diet       Follow this diet upon discharge: Regular Diet            Discharge Instructions       You need to wear your oxygen at all times until follow up with pulmonology. Absolutely no smoking while using oxygen.            Oxygen Adult       [facility] Order 6-8 liter(s) by nasal cannula continuously with use of portable tank. Expected treatment length is 6 months.. Test on conserving device as applicable.    Patients who qualify for home O2 coverage under the CMS guidelines require ABG tests or O2 sat readings obtained closest to, but no earlier than 2 days prior to the discharge, as evidence of the need for home oxygen therapy. Testing must be performed while patient is in the chronic stable state. See notes for O2 sats.    I certify that this patient, Naseem Ferrer has been under my care and that I, or a nurse practitioner or physician's assistant working with me, had a face-to-face encounter that meets the face-to-face encounter requirements with this patient on 2/5/2018. The patient, Naseem Ferrer was evaluated or treated in whole, or in part, for the following medical condition, which necessitates the use of the ordered oxygen. Treatment Diagnosis: COPD    Attending Provider: Golden Can  Physician signature: See electronic signature associated with these discharge orders  Date of Order: February 5, 2018                  Follow-up Appointments     Adult [facility] Follow-up and recommended labs and tests       Follow up with primary care provider, Physician No Ref-Primary, within 7 days for hospital follow up. Repeat CBC, CMP recommended. Discuss removing Seroquel.     Pulmonology follow up within 4 weeks of discharge for COPD optimization. You are requiring oxygen at all times.     Surgical Oncology follow up to discuss next step in liver biopsy.    Appointments on [facility] and/or [facility] (with [facility] or [facility] provider or service). Call [phone] if you haven't  "heard regarding these appointments within 7 days of discharge.                  Additional Services     Pulmonary Medicine Referral       COPD - needs to establish care. New o2 requirement this hospitalization.            Pulmonary Rehab Referral       *This therapy referral will be filtered to a centralized scheduling office at [facility] and the patient will receive a call to schedule an appointment at a [place] location most convenient for them.*     If you have not heard from the scheduling office within 2 business days, please call [phone] for all locations.    Please be aware that coverage of these services is subject to the terms and limitations of your health insurance plan.  Call member services at your health plan with any benefit or coverage questions.      **Note to Provider:  If you are referring outside of [place] for the therapy appointment, please list the name of the location in the \"special instructions\" above, print the referral and give to the patient to schedule the appointment.               SLEEP EVALUATION & MANAGEMENT REFERRAL - ADULT -Other (Respond in commments) (Closest to [place])       Please be aware that coverage of these services is subject to the terms and limitations of your health insurance plan.  Call member services at your health plan with any benefit or coverage questions.      Please bring the following to your appointment:    >>   List of current medications   >>   This referral request   >>   Any documents/labs given to you for this referral                      Pending Results     No orders found from 1/26/2018 to 1/29/2018.            Statement of Approval     Ordered          02/05/18 1227  I have reviewed and agree with all the recommendations and orders detailed in this document.  EFFECTIVE NOW     Approved and electronically signed by:  Michael Moran PA-C             Admission Information     Date & Time Provider Department Dept. " "Phone    2018 Golden Can MD [address] [facility] [phone]      Your Vitals Were     Blood Pressure Pulse Temperature Respirations Height Weight    114/62 (BP Location: Left arm) 104 98.1  F (36.7  C) (Oral) 24 1.638 m (5' 4.5\") 66.5 kg (146 lb 9.7 oz)    Pulse Oximetry BMI (Body Mass Index)                91% 24.78 kg/m2          DataParenting Information     DataParenting lets you send messages to your doctor, view your test results, renew your prescriptions, schedule appointments and more. To sign up, go to [email or web address] . Click on \"Log in\" on the left side of the screen, which will take you to the Welcome page. Then click on \"Sign up Now\" on the right side of the page.     You will be asked to enter the access code listed below, as well as some personal information. Please follow the directions to create your username and password.     Your access code is: [email or web address]  Expires: 2018  2:04 PM     Your access code will  in 90 days. If you need help or a new code, please call your [facility] clinic or [phone].        Care EveryWhere ID     This is your Care EveryWhere ID. This could be used by other organizations to access your [facility] medical records  [id number]        Equal Access to Services     GETACHEW MICHEL AH: Hadii norma levineo Sosrinivas, waaxda luqadaha, qaybta kaalmada curtis, aure bang. So [facility] [phone].    ATENCIÓN: Si habla español, tiene a bhagat disposición servicios gratuitos de asistencia lingüística. Llame al [phone].    We comply with applicable federal civil rights laws and [place] laws. We do not discriminate on the basis of race, color, national origin, age, disability, sex, sexual orientation, or gender identity.               Review of your medicines      START taking        Dose / Directions    folic acid 1 MG tablet   Commonly known as:  FOLVITE   Used for:  Alcohol abuse        Dose:  1 mg   Start taking " on:  2/6/2018   Take 1 tablet (1 mg) by mouth daily   Quantity:  30 tablet   Refills:  0       loratadine 10 MG tablet   Commonly known as:  CLARITIN   Used for:  Chronic obstructive pulmonary disease, unspecified COPD type (H), Acute seasonal allergic rhinitis, unspecified trigger        Dose:  10 mg   Take 1 tablet (10 mg) by mouth daily   Quantity:  30 tablet   Refills:  0       multivitamins with minerals Liqd liquid        Dose:  15 mL   Start taking on:  2/6/2018   15 mLs by Per Feeding Tube route daily   Refills:  0       QUEtiapine 25 MG tablet   Commonly known as:  SEROquel   Used for:  Encephalopathy        Dose:  25 mg   Take 1 tablet (25 mg) by mouth 2 times daily   Quantity:  60 tablet   Refills:  0       thiamine 100 MG tablet   Used for:  Alcohol abuse        Dose:  100 mg   Start taking on:  2/6/2018   Take 1 tablet (100 mg) by mouth daily   Quantity:  30 tablet   Refills:  0         CONTINUE these medicines which have NOT CHANGED        Dose / Directions    albuterol 108 (90 BASE) MCG/ACT Inhaler   Commonly known as:  PROAIR HFA/PROVENTIL HFA/VENTOLIN HFA        Dose:  2 puff   Inhale 2 puffs into the lungs every 4 hours as needed for shortness of breath / dyspnea or wheezing   Refills:  0       fluticasone 50 MCG/ACT spray   Commonly known as:  FLONASE   Indication:  Allergic Rhinitis        Dose:  2 spray   Spray 2 sprays into both nostrils daily   Refills:  0       fluticasone-salmeterol 100-50 MCG/DOSE diskus inhaler   Commonly known as:  ADVAIR   Used for:  Chronic obstructive pulmonary disease, unspecified COPD type (H)        Dose:  1 puff   Inhale 1 puff into the lungs 2 times daily   Quantity:  1 Inhaler   Refills:  0       ipratropium - albuterol 0.5 mg/2.5 mg/3 mL 0.5-2.5 (3) MG/3ML neb solution   Commonly known as:  DUONEB   Used for:  Chronic obstructive pulmonary disease, unspecified COPD type (H)        Dose:  1 vial   Take 1 vial (3 mLs) by nebulization 4 times daily   Quantity:  360  mL   Refills:  0       LORAZEPAM PO        Dose:  0.5 mg   Take 0.5 mg by mouth every 8 hours as needed for anxiety Prescribed in January 2018- patient has not started taking it yet.   Refills:  0         STOP taking     ASPIRIN PO                Where to get your medicines      These medications were sent to [facility] - [place], [place] - [address]  [address] [address]     Phone:  [phone]     fluticasone-salmeterol 100-50 MCG/DOSE diskus inhaler    folic acid 1 MG tablet    QUEtiapine 25 MG tablet    thiamine 100 MG tablet         Some of these will need a paper prescription and others can be bought over the counter. Ask your nurse if you have questions.     You don't need a prescription for these medications     loratadine 10 MG tablet                Protect others around you: Learn how to safely use, store and throw away your medicines at www.disposemymeds.org.             Medication List: This is a list of all your medications and when to take them. Check marks below indicate your daily home schedule. Keep this list as a reference.      Medications           Morning Afternoon Evening Bedtime As Needed    albuterol 108 (90 BASE) MCG/ACT Inhaler   Commonly known as:  PROAIR HFA/PROVENTIL HFA/VENTOLIN HFA   Inhale 2 puffs into the lungs every 4 hours as needed for shortness of breath / dyspnea or wheezing                                fluticasone 50 MCG/ACT spray   Commonly known as:  FLONASE   Spray 2 sprays into both nostrils daily                                fluticasone-salmeterol 100-50 MCG/DOSE diskus inhaler   Commonly known as:  ADVAIR   Inhale 1 puff into the lungs 2 times daily                                folic acid 1 MG tablet   Commonly known as:  FOLVITE   Take 1 tablet (1 mg) by mouth daily   Start taking on:  2/6/2018   Last time this was given:  1 mg on 2/5/2018 10:28 AM                                ipratropium - albuterol 0.5 mg/2.5  mg/3 mL 0.5-2.5 (3) MG/3ML neb solution   Commonly known as:  DUONEB   Take 1 vial (3 mLs) by nebulization 4 times daily   Last time this was given:  3 mLs on 2/5/2018 12:26 PM                                loratadine 10 MG tablet   Commonly known as:  CLARITIN   Take 1 tablet (10 mg) by mouth daily                                LORAZEPAM PO   Take 0.5 mg by mouth every 8 hours as needed for anxiety Prescribed in January 2018- patient has not started taking it yet.                                multivitamins with minerals Liqd liquid   15 mLs by Per Feeding Tube route daily   Start taking on:  2/6/2018   Last time this was given:  15 mLs on 2/3/2018  8:58 AM                                QUEtiapine 25 MG tablet   Commonly known as:  SEROquel   Take 1 tablet (25 mg) by mouth 2 times daily   Last time this was given:  25 mg on 2/5/2018 10:28 AM                                thiamine 100 MG tablet   Take 1 tablet (100 mg) by mouth daily   Start taking on:  2/6/2018   Last time this was given:  200 mg on 2/5/2018 10:28 AM

## 2018-01-28 NOTE — LETTER
Transition Communication Hand-off for Care Transitions to Next Level of Care Provider    Name: Naseem Ferrer  MRN #: [id number]  Primary Care Provider: Jonna Clements     Primary Clinic: [facility] [address] [address]     Reason for Hospitalization:  peritoneal bleeding  Liver lesion  Admit Date/Time: 1/28/2018  9:36 PM  Discharge Date: 2/5/2018    Payor Source: Payor: [facility] / Plan: [facility] CARE / Product Type: HMO /          Reason for Communication Hand-off Referral: Admission diagnoses: COPD  Difficulty understanding plan of care  Other Continuity of care    Discharge Plan: discharge to home with new supplemental oxygen and pulmonology referral     Concern for non-adherence with plan of care:  Yes  Discharge Needs Assessment:  Needs       Most Recent Value    Equipment Currently Used at Home none [does not have AE, AD at home, wife states could borrow]    Transportation Available -- [wife drives, pt reports he does not drive]          Already enrolled in Tele-monitoring program and name of program:  NA  Follow-up specialty is recommended: Yes    Follow-up plan:  Future Appointments  Date Time Provider Department Center   2/6/2018 6:00 AM Obdulia Brooks, [facility]   2/10/2018 10:30 AM SOFIA Hoskins MD [facility]       Any outstanding tests or procedures:    Procedures     Future Labs/Procedures    Oxygen Adult     Comments:    [facility] Order 6-8 liter(s) by nasal cannula continuously with use of portable tank. Expected treatment length is 6 months.. Test on conserving device as applicable.    Patients who qualify for home O2 coverage under the CMS guidelines require ABG tests or O2 sat readings obtained closest to, but no earlier than 2 days prior to the discharge, as evidence of the need for home oxygen therapy. Testing must be performed while patient is in the chronic stable state. See notes for O2 sats.    I certify that this patient,  "Naseem Ferrer has been under my care and that I, or a nurse practitioner or physician's assistant working with me, had a face-to-face encounter that meets the face-to-face encounter requirements with this patient on 2/5/2018. The patient, Naseem Ferrer was evaluated or treated in whole, or in part, for the following medical condition, which necessitates the use of the ordered oxygen. Treatment Diagnosis: COPD    Attending Provider: Golden Can  Physician signature: See electronic signature associated with these discharge orders  Date of Order: February 5, 2018          Referrals     Future Labs/Procedures    Pulmonary Medicine Referral     Comments:    COPD - needs to establish care. New o2 requirement this hospitalization.    Pulmonary Rehab Referral     Comments:    *This therapy referral will be filtered to a centralized scheduling office at [facility] and the patient will receive a call to schedule an appointment at a [place] location most convenient for them.*     If you have not heard from the scheduling office within 2 business days, please call [phone] for all locations.    Please be aware that coverage of these services is subject to the terms and limitations of your health insurance plan.  Call member services at your health plan with any benefit or coverage questions.      **Note to Provider:  If you are referring outside of [place] for the therapy appointment, please list the name of the location in the \"special instructions\" above, print the referral and give to the patient to schedule the appointment.       SLEEP EVALUATION & MANAGEMENT REFERRAL - ADULT -Other (Respond in commments) (Closest to [place])     Comments:    Please be aware that coverage of these services is subject to the terms and limitations of your health insurance plan.  Call member services at your health plan with any benefit or coverage questions.      Please bring the following " to your appointment:    >>   List of current medications   >>   This referral request   >>   Any documents/labs given to you for this referral                Key Recommendations:      Kenzie Ward  RN Care Coordinator  [phone]    AVS/Discharge Summary is the source of truth; this is a helpful guide for improved communication of patient story

## 2018-01-28 NOTE — IP AVS SNAPSHOT
[address] [address]    Phone:  [phone]                                       After Visit Summary   1/28/2018    Naseem Ferrer    MRN: [id number]           After Visit Summary Signature Page     I have received my discharge instructions, and my questions have been answered. I have discussed any challenges I see with this plan with the nurse or doctor.    ..........................................................................................................................................  Patient/Patient Representative Signature      ..........................................................................................................................................  Patient Representative Print Name and Relationship to Patient    ..................................................               ................................................  Date                                            Time    ..........................................................................................................................................  Reviewed by Signature/Title    ...................................................              ..............................................  Date                                                            Time

## 2018-01-29 NOTE — PROGRESS NOTES
SURGICAL ICU PROGRESS NOTE  January 29, 2018      CO-MORBIDITIES:   No diagnosis found.    ASSESSMENT: Naseem Ferrer is a 62 year old man with PMH of COPD presented to an outside hospital with worsening abdominal pain over the past 2-3 weeks. Found to have a large liver lesion on CT, with hemoglobin of 9 (from baseline reported 16), concern for lesion rupture and bleeding (outside hospital CT with contrast did not demonstrate active extravasation).    CHANGES FOR TODAY:  - Thiamine and folate. (Discussed with patient, no hx of daily benzodiazepines or heavy drinking)  - D5/1/2NS - maintenance fluid calculated from weight   - Hemoglobin BID and discontinue checks if stable.   - Potassium check at 1300  - Palliative consult       PLAN:   Neuro/ pain/ sedation:  - Dilaudid prn for pain  - History of anxiety, plan to avoid benzodiazepines  - Discussed with patient and wife at bedside, only drinks on weekends. Denies history of heavy drinking.      Pulmonary care:   - Supplemental oxygen, sat >88% okay given history of COPD   - Home inhalers and prn nebs       Cardiovascular:    - Monitor hemodynamic status. Currently blood pressure within normal limits. Lactate mildly elevated at 2.1 on admission then normalized later to 0.8     GI care:   - NPO except ice chips and medications  - Zofran for nausea   - Workup for liver lesion, no cirrhosis noted on CT however patient reports regular alcohol use      Fluids/ Electrolytes/ Nutrition:   - NS maintenance for IV fluid hydration  - K of 5.4, given insulin and dextrose. Repeat BMP later today     Renal/ Fluid Balance:    - Will monitor intake and output.      Endocrine:    - No history of diabetes       ID/ Antibiotics:  - No indication for antibiotics at this time       Heme:     - Monitor hemoglobin, coagulation parameters, platelets  - If further evidence of bleeding would consider IR consult for embolization      Prophylaxis:    - Mechanical prophylaxis for DVT.   -  No chemical DVT prophylaxis due to high risk of bleeding.  - GI ppx while NPO      Lines/ tubes/ drains:  - PIVs x 2      Disposition:  - Surgical ICU.     ====================================    SUBJECTIVE:   - Admitted overnight with bleeding hepatic lesion. Currently hemodynamically stable. Endorses abdominal pain. No nausea or vomiting.     OBJECTIVE:   1. VITAL SIGNS:   Temp:  [97.3  F (36.3  C)-97.8  F (36.6  C)] 97.8  F (36.6  C)  Heart Rate:  [] 102  Resp:  [18-24] 18  BP: (100-135)/(49-86) 103/51  SpO2:  [85 %-94 %] 92 %  Resp: 18    2. INTAKE/ OUTPUT:   I/O last 3 completed shifts:  In: 1798.34 [I.V.:798.34; IV Piggyback:1000]  Out: 700 [Urine:300; Emesis/NG output:400]    3. PHYSICAL EXAMINATION:   General: Sleeping in moderate distress  Neuro: Awake, moving all four extremities spontaneously.   Resp: Breathing non-labored on 2L NC  CV: RRR  Abdomen: Soft, ttp over RUQ, no peritoneal signs   Extremities: warm and well perfused    4. INVESTIGATIONS:   Complete Blood Count     Recent Labs  Lab 01/29/18  0430 01/28/18 2148   WBC 23.0* 16.6*   HGB 8.0* 9.2*    286     Basic Metabolic Panel    Recent Labs  Lab 01/29/18  0430 01/28/18 2148    136   POTASSIUM 5.4* 4.5   CHLORIDE 105 104   CO2 28 25   BUN 19 15   CR 0.86 0.69   * 122*     Liver Function Tests    Recent Labs  Lab 01/28/18 2148   *   *   ALKPHOS 120   BILITOTAL 1.2   ALBUMIN 2.7*   INR 0.98     Coagulation Profile    Recent Labs  Lab 01/28/18 2148   INR 0.98   PTT 28     Lactate  0.8    5. RADIOLOGY:   Recent Results (from the past 24 hour(s))   XR Chest Port 1 View    Narrative    XR CHEST PORT 1 VW  1/28/2018 10:13 PM      HISTORY: Preop/baseline    COMPARISON: None    FINDINGS: Single portable frontal view of the chest at 60 degree.  There is a slight oblique projection, limiting evaluation. Normal  heart size. Pulmonary vasculature within normal limits. No focal  consolidation. There is a lucent  tubular structure, deviating to the  left side of the mediastinum, presumably distended esophagus. No  pleural effusion or pneumothorax.      Impression    IMPRESSION:   1. Lungs are clear.  2. Presumably mildly distended esophagus, although evaluation is  limited by the suboptimal projection. May consider repeat PA and  lateral if clinically indicated.    I have personally reviewed the examination and initial interpretation  and I agree with the findings.    NABILA ESCAMILLA MD       =========================================      Patient seen, findings and plan discussed with surgical ICU staff Chitra.    Jaya Lanier MD  General Surgery, PGY-2  [phone]

## 2018-01-29 NOTE — ANESTHESIA PROCEDURE NOTES
ANESTHESIOLOGY RESIDENT/CRNA INTUBATION NOTE  Indication for intubation: respiratory insufficiency.  Provider Ordering Intubation: LIBRADO ANTOINE  History regarding the most recent potassium obtained: Yes  History regarding renal failure obtained: Yes  History of presence or absence of CVA/stroke was obtained: Yes  History of presence or absence of NM disorder obtained: Yes  Post Intubation:  No apparent complications, Sedation to be ordered by primary/ICU team, Primary/ICU team to review CXR, Vent settings by primary/ICU team, ETT secured and Report given to primary nurse and/or team  Called to ICU for respiratory failure and agitation. Informed by ICU team that patient had worsening ABGs despite bipap. Pt intubated with CMAC 4 and 8.0 ETT. End tidal CO2 was noted after intubation. ICU team to follow up CXR and post intubation ABG. Pt given long acting paralysis and would expect paralysis for another 2-3 hours.

## 2018-01-29 NOTE — H&P
SURGICAL ICU ADMISSION NOTE  1/28/2018    PRIMARY TEAM: General surgery  PRIMARY PHYSICIAN: Shorty    REASON FOR CRITICAL CARE ADMISSION: Hemodynamic monitoring    ADMITTING PHYSICIAN: Ron     ASSESSMENT: 62 year old man with PMH of COPD presented to an outside hospital with worsening abdominal pain over the past 2-3 weeks. Found to have a large liver lesion on CT, with hemoglobin of 9 (from baseline reported 16), concern for lesion rupture and bleeding (outside hospital CT with contrast did not demonstrate active extravasation).    PLAN:   Neuro/ pain/ sedation:  - Dilaudid prn for pain  - History of anxiety, prefer to avoid benzodiazepines      Pulmonary care:   - Supplemental oxygen, sat >88% okay given history of COPD   - Home inhalers and prn nebs      Cardiovascular:    - Monitor hemodynamic status. Currently blood pressure within normal limits. Lactate mildly elevated at 2.1, recheck in AM.     GI care:   - NPO except ice chips and medications  - Zofran for nausea   - Workup for liver lesion, no cirrhosis noted on CT however patient reports regular alcohol use     Fluids/ Electrolytes/ Nutrition:   - LR maintenance for IV fluid hydration  - Replace electrolytes as needed      Renal/ Fluid Balance:    - Will monitor intake and output.     Endocrine:    - No history of diabetes      ID/ Antibiotics:  - No indication for antibiotics at this time      Heme:     - Monitor hemoglobin, coagulation parameters, platelets  - If further evidence of bleeding would consider IR consult for embolization     Prophylaxis:    - Mechanical prophylaxis for DVT.   - No chemical DVT prophylaxis due to high risk of bleeding.  - GI ppx while NPO     Lines/ tubes/ drains:  - PIVs x 2     Disposition:  - Surgical ICU.     Patient seen, findings and plan discussed with ICU staff.    Scout Garrido MD  Surgery PGY-3    - - - - - - - - - - - - - - - - - - - - - - - - - - - - - - - - - - - - - - - - - - - - - - - - - - - - - - -  - - - - - - - - - - - - - - - - -     HISTORY PRESENTING ILLNESS: 62 year old man with PMH of COPD presenting from an outside hospital with 2-3 weeks of right-sided abdominal pain. He was seen by his primary doctor and underwent a CT and MRI of the abdomen recently to work up the pain, he was found to have a large mass in the right lobe of the liver. He was scheduled to undergo liver biopsy later this week, but today his pain worsened to the point of being unbearable and he presented to the emergency department where he was found to be tachycardic with a hemoglobin of 9. There was some question of whether the mass in the liver had bled into the abdomen. He was transferred here for further management. He denies other symptoms including fevers, chills, nausea, vomiting, constipation, jaundice.     REVIEW OF SYSTEMS: 10 point ROS neg other than the symptoms noted above in the HPI.    PAST MEDICAL HISTORY:   Porphyria   COPD  Anxiety     SURGICAL HISTORY:   Orthopedic surgeries     SOCIAL HISTORY:   0.25 - 1 PPD x 50 years smoking history  EtOH use heavy on the weekends (5-6 drinks), minimal during the week    FAMILY HISTORY: No bleeding/clotting disorders nor problems with anesthesia.     ALLERGIES:      Allergies   Allergen Reactions     Mold Unknown     Tramadol Unknown       MEDICATIONS:  Inhalers & nebs for COPD  Xanax prn for anxiety     PHYSICAL EXAMINATION:  Temp:  [97.3  F (36.3  C)] 97.3  F (36.3  C)  Heart Rate:  [122-124] 112  Resp:  [24] 24  BP: (100-118)/(62-85) 115/77  SpO2:  [85 %-91 %] 88 %    General: awake, alert, distressed due to pain  HEENT: no neck swelling or tenderness   Neuro: moves all extremities, speech clear   CV: sinus tachycardia   Pulm: breathing non-labored when at rest, RR exacerbated by pain  Abd: soft, diffusely tender to palpation, non-distended   Extremities: no edema   Skin: no rashes     LABS: Reviewed.     Complete Blood Count     Recent Labs  Lab 01/28/18  2148   WBC 16.6*    HGB 9.2*        Basic Metabolic Panel    Recent Labs  Lab 01/28/18  2148      POTASSIUM 4.5   CHLORIDE 104   CO2 25   BUN 15   CR 0.69   *     Liver Function Tests    Recent Labs  Lab 01/28/18  2148   *   *   ALKPHOS 120   BILITOTAL 1.2   ALBUMIN 2.7*   INR 0.98     Coagulation Profile    Recent Labs  Lab 01/28/18  2148   INR 0.98   PTT 28       IMAGING:  Recent Results (from the past 24 hour(s))   XR Chest Port 1 View    Narrative    Preliminary:   1. Lungs are clear.  2. Presumably mildly distended esophagus, although evaluation is  limited by the suboptimal projection. May consider repeat AP and  lateral if clinically indicated.

## 2018-01-29 NOTE — CONSULTS
Surgical Oncology Consult Note  Staff: Dr. Connelly  Date: 1/29/2018   Consulted for: Bleeding liver mass by Dr. Davis    Assessment/Plan:  62 year old male with COPD and a newly diagnosed liver mass. The etiology of the mass is unclear. If there are continual signs/symptoms of ongoing bleeding, patient should undergo IR embolization. Also, patient should have further work up for hypoxia.  -Agree with pRBC transfusion  -Recommend IR tonight for embolization  -Surg Onc to follow    Discussed with Dr. Pineda who will discuss with Dr. Maricel Jimenez MD  General Surgery PGY-3    ------------------------------------------  HPI:   Naseem Ferrer is a 62 year old male with COPD and a newly diagnosed liver mass. Patient is not very cooperative with history and must be prompted many times by wife to answer questions because he appears to ignore the author. He has had abdominal pain for a couple months and recently went to his PCP who ordered a CT scan that demonstrated a liver mass in the R lobe. He was scheduled to get this biopsied in [place] this Wednesday, however had increasing pain over the weekend and went to the ED. They did another CT scan and it appeared that the mass might have bled as his Hgb was 9 (apparently has baseline around 16). He was otherwise asymptomatic and hemodynamically normal but was transferred to the [facility] SICU for ongoing management. He denies fevers, chills, CP, and SOB and would not answer further ROS questions. States his father might have had leukemia and his mother and brother and sister all had lung cancer. 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 He states the pain is getting better. It is intermittent and does not radiate. Won't answer if anything makes it better or worse.  ?  PMH:  COPD  Liver mass    PSHx:  Back surgery  Shoulder surgery  "    Medications:  Advair    Allergies:   Mold and Tramadol     SocHx:  Lives with his wife and they run a day care  Smokes ~4 cigs/day  Drinks on the weekends. He or his wife would not quantify how much  No other drugs    FamHx:  Negative for bleeding disorders, clotting disorders, or problems with anesthesia    Review of Systems:  ROS: 10 point ROS neg other than the symptoms noted above in the HPI.    Physical Examination   /58 (BP Location: Right arm)  Temp 98.1  F (36.7  C) (Oral)  Resp 16  Ht 1.638 m (5' 4.5\")  Wt 66.7 kg (147 lb)  SpO2 96%  BMI 24.84 kg/m2  General: Awake and alert. Appears somewhat uncomfortable  Pulm: Somewhat labored breathing, no tachypnea on 6L oxymask  CV: RRR  Abdomen: soft, distended, tender in rights quadrants upper>lower, no guarding, no tenderness to percussion, no peritoneal signs, no surgical scars  Musculoskel/Extremities: no edema, erythema, tenderness   Skin: no rashes, no diaphoresis and skin color normal     Labs: Reviewed  WBC 16.6 -> 23  Hgb 9 -> 8 -> 6.4  Plt 284  INR 1    K 5.4 -> 5    Lactate 2.1 -> 0.8    Tbili 1.2  Alk P 120          Imaging: Reviewed  CT Abd/Pelvis - no official read available  Large heterogenous lesion in the R liver  "

## 2018-01-29 NOTE — CONSULTS
[facility] Surgery Consultation    Naseem Ferrer MRN# [id number]   Age: 62 year old YOB: 1955     Date of Admission: 1/28/2018    Date of Consult:  01/28/18    Reason for consult: Abdominal pain       Requesting service: SICU                    Assessment and Plan:   Assessment:   62 year old man with large liver lesion, presumably bled into the abdomen and led to hemoglobin drop with abdominal pain. Currently hemoglobin is stable and no indication of active ongoing massive bleeding.      Plan:   -recommend IR embolization if patient shows evidence of recurrent bleeding     Discussed with staff, Dr. Oro            Chief Complaint:   Abdominal pain         History of Present Illness:   62 year old man with PMH of COPD presenting from an outside hospital with 2-3 weeks of right-sided abdominal pain. He was seen by his primary doctor and underwent a CT and MRI of the abdomen recently to work up the pain, he was found to have a large mass in the right lobe of the liver. He was scheduled to undergo liver biopsy later this week, but today his pain worsened to the point of being unbearable and he presented to the emergency department where he was found to be tachycardic with a hemoglobin of 9. There was some question of whether the mass in the liver had bled into the abdomen. He was transferred here for further management. He denies other symptoms including fevers, chills, nausea, vomiting, constipation, jaundice.          Past Medical History:   COPD  Anxiety  Porphyria          Past Surgical History:   Orthopedic surgeries           Social History:   Tobacco: 50 pack-year history   EtOH: 5-6 drinks per day on the weekends           Family History:   No history of bleeding or clotting disorders           Allergies:   Mold  Tramadol          Medications:     Current Facility-Administered Medications   Medication     naloxone (NARCAN) injection 0.1-0.4 mg     lactated ringers infusion      acetaminophen (TYLENOL) tablet 650 mg    Or     acetaminophen (TYLENOL) solution 650 mg     ondansetron (ZOFRAN-ODT) ODT tab 4 mg    Or     ondansetron (ZOFRAN) injection 4 mg     HYDROmorphone (PF) (DILAUDID) injection 0.3-0.5 mg     albuterol neb solution 1.25 mg     [START ON 1/29/2018] fluticasone-vilanterol (BREO ELLIPTA) 100-25 MCG/INH oral inhaler 1 puff     ipratropium - albuterol 0.5 mg/2.5 mg/3 mL (DUONEB) neb solution 3 mL     famotidine (PEPCID) injection 20 mg     lactated ringers BOLUS 1,000 mL             Review of Systems:   No recent weight loss, fevers, or chills  No jaundice  No headaches or light-headedness, no dizziness  No cough, chest pain, shortness of breath  No pain or burning with urination  No numbness or tingling of the extremities          Physical Exam:   All vitals have been reviewed  Temp:  [97.3  F (36.3  C)] 97.3  F (36.3  C)  Heart Rate:  [111-124] 115  Resp:  [24] 24  BP: (100-131)/(62-86) 131/86  SpO2:  [85 %-91 %] 90 %    Intake/Output Summary (Last 24 hours) at 01/28/18 2349  Last data filed at 01/28/18 2300   Gross per 24 hour   Intake          1096.67 ml   Output              400 ml   Net           696.67 ml     Physical Exam:  General: awake, alert, NAD   HEENT: no neck swelling or tenderness   CV: sinus tachycardia   Pulm: non-labored breathing on nasal cannula O2  Abd: soft, diffusely tender but no peritonitis, mildly distended  Neuro: moving all four extremities, speech clear  Extremities: no edema  Vascular: palpable pulses  Skin: no rashes        Data:   All laboratory data reviewed    Results:  BMP  Recent Labs  Lab 01/28/18 2148      POTASSIUM 4.5   CHLORIDE 104   CO2 25   BUN 15   CR 0.69   *     CBC  Recent Labs  Lab 01/28/18 2148   WBC 16.6*   HGB 9.2*        LFT  Recent Labs  Lab 01/28/18 2148   *   *   ALKPHOS 120   BILITOTAL 1.2   ALBUMIN 2.7*   INR 0.98       Recent Labs  Lab 01/28/18  2148   *       Imaging: Outside  CT abdomen and MRI reviewed.     Scout Garrido MD

## 2018-01-29 NOTE — CONSULTS
"[facility]  Palliative Care Consultation         Naseem Ferrer MRN# [id number]   Age: 62 year old YOB: 1955   Date of Admission: 1/28/2018    Reason for consult: Goals of care    Symptom management   Requesting physician/service: SICU team       Recommendations        Pt seen and examined but very somnolent/restless. Answers to history questions per spouse.   She is concerned about his status and how perhaps he has recently increased ETOH consumption may be contributing to his condition- she is unable to quantify his drinking.     Some sense of ongoing abd discomfort. Reviewed with primary team.   REC: Continue with present interventions  - [facility] protocol for possible withdrawal-  I remain concerned he may be understating the amount he drinks.   - low dose opiates as you are for abd pain.  - Palliative counseling staff and  to see.     POLST - not complete at this time   Remains full code     Disease Process/es & Symptoms   COPD- not 02 dependent at home  Liver mass- unclear etiology- bleeding limits biopsy options at present.   Anemia related to liver mass.- transfusion per primary team    ETOH abuse- has been through treatment x2.- no history of seizure/withdrawl per spouse       Support/Coping   (We typically ask each of our patients the following questions:)  NOTE: Pt unable to answer questions- discussion of coping with spouse Nenita  Are you Adventist? Spiritual? Not so much? \" Not sure how he feels about toño at this time.\"  What are your concerns, medical and non-medical, that are not being addressed? He has a \"seven year old daughter at home who has been told her dad may die.\"  Who are your \"go-to\" people when you need support?- family    Plan for psychosocial/spiritual support/follow-up from palliative team: will ask team for support.     Decision-Making & Goals of Care     Discussion/counseling today about prognosis/goals of care/decisions:   Will " review with Palliative care team and with primary team  Patient has a completed health care directive available in the chart (N)  Health care agent (only if patient has an available, complete HCD): spouse  Physician orders for life-sustaining treatment (POLST) form is not completed  Code Status: Full code   Patient has decision-making capacity (question at this time.)    Coordination of Care     Findings & plan of care discussed with: family  Follow-up plan from palliative team: as above  Thank you for involving us in the patient's care.     Osman BEARDEN NP  Nurse Practitioner- Lead Advanced Practice Provider  [facility] Palliative Medicine Consult Service   [phone]    TT spent: 45 minutes of which 35 minutes were spent in direct face to face contact with patient/family. Greater than 50% of time spent counseling and/or coordinating care.           Chief Complaint     Abdominal pain         History of Present Illness     History obtained from: EPIC record and discussion with pt wife.    62 year old community dwelling man with PMH of COPD, non 02 dependent, ETOH use- presented to an outside hospital ([facility]) with worsening abdominal pain over the past 2-3 weeks. Found to have a large liver lesion on recent CT, with hemoglobin of 9 (from baseline reported 16), concern for lesion rupture and bleeding (outside hospital CT with contrast did not demonstrate active extravasation). Transferred to [facility] for evaluation, placed in ICU for cares- Palliative asked to see for support and goals of care with sudden change in health status.           Past Medical History:   ETOH use and abuse  COPD  Liver mass             Past Surgical History:   No past surgical history on file.            Social/Spiritual History:     Living situation: lives with wife and 6 yo daughter in home with stair requirement in [facility]  Family system: 2 adult children- ? Estranged from one- in [place] [facility] [place]\""., one in [place]. 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  Use of community resources: none  Activities/interests: more limited of late- TV, friends- used to enjoy family outings  History of substance use/abuse: ETOH, ? Other substance in past            Family History:   No family history on file.  Family history reviewed and updated in EPIC and reviewed           Allergies:   Allergies   Allergen Reactions     Mold Unknown     Tramadol Unknown              Medications:   I have reviewed this patient's medication profile and medications during this hospitalization             Review of Systems:   The comprehensive review of systems is negative other than noted here and in the HPI.    Palliative Symptom Review unable to be performed due to somnolence and critical illness. Unable to participate meaningfully in interview or exam.         Physical exam: Vitals: /61  Temp 97.4  F (36.3  C)  Resp 23  Ht 1.638 m (5' 4.5\")  Wt 66.7 kg (147 lb)  SpO2 93%  BMI 24.84 kg/m2  BMI= Body mass index is 24.84 kg/(m^2).  General: Quite somnolent, restless. Able to be redirected at present. Appears somewhat uncomfortable  HEENT: NC/AT, 02 via simple mask. Injected conjunctiva. Non icteric. MM mildly dry  Pulm: Somewhat labored breathing, no tachypnea on 6L oxymask  CV: RRR, occ ectopy. Sinus rhythm on monitor. S1 S2 w/o Murmur  Abdomen: soft, diffusely tender and distended, prefers side lying, no peritoneal signs, no surgical scars  Musculoskel/Extremities: Moves all 4's. Trace mandie LE edema, no erythema, tenderness   Skin: no exposed rashes, no diaphoresis and skin color normal             Data Reviewed:     ROUTINE LABS (Last four results)  BMP  Recent Labs  Lab 01/29/18  1411 01/29/18  0736 01/29/18  0430 01/28/18  2148   NA  --   --  139 136   POTASSIUM 5.0 5.4* 5.4* 4.5   CHLORIDE  --   --  105 104   JACKELIN  --   -- "  7.8* 7.6*   CO2  --   --  28 25   BUN  --   --  19 15   CR  --   --  0.86 0.69   GLC  --   --  128* 122*     CBC  Recent Labs  Lab 01/29/18  1411 01/29/18  0430 01/28/18 2148   WBC  --  23.0* 16.6*   RBC  --  2.57* 2.96*   HGB 6.4* 8.0* 9.2*   HCT  --  26.4* 29.7*   MCV  --  103* 100   MCH  --  31.1 31.1   MCHC  --  30.3* 31.0*   RDW  --  13.8 13.7   PLT  --  284 286     INR  Recent Labs  Lab 01/28/18 2148   INR 0.98

## 2018-01-29 NOTE — PLAN OF CARE
Problem: Patient Care Overview  Goal: Plan of Care/Patient Progress Review  Outcome: No Change              Progress Note      D/A:  62 year old man with PMH of COPD presented to an outside hospital with worsening abdominal pain over the past 2-3 weeks. Found to have a large liver lesion on CT, with hemoglobin of 9 (from baseline reported 16), concern for lesion rupture and bleeding (outside hospital CT with contrast did not demonstrate active extravasation).    Neuro: pt waxes with questions and at times has slurred/garbled/incoherrent speech making odd statements  CV: Sinus tachy with rare ectopy noted  Pulm: LS diminished and course, HX of COPD, pt on 6L via NC without pt sats in the 70's with no issues, dose not wear oxygen at home  GI: abd distended with pain  : voids freely in the urinal, tea/enriqueta/clear/dark urine  Skin: no issues noted, pale/ashen/mottled, very diaphoretic   Gtts:   Lines: PIV x2  Other: pt is very anxious and gets very frustrated quickly. Pt last known drink was sometime yesterday      R: Pt resting between cares   P: Continue with POC

## 2018-01-30 NOTE — BRIEF OP NOTE
Interventional Radiology Brief Post Procedure Note    Procedure: IR VISCERAL EMBOLIZATION    Proceduralist: iMc Renner MD    Assistant: Huber Ernst MD    Time Out: Prior to the start of the procedure and with procedural staff participation, I verbally confirmed the patient s identity using two indicators, relevant allergies, that the procedure was appropriate and matched the consent or emergent situation, and that the correct equipment/implants were available. Immediately prior to starting the procedure I conducted the Time Out with the procedural staff and re-confirmed the patient s name, procedure, and site/side. (The Joint Commission universal protocol was followed.)  Yes    Medications   Medication Event Details Admin User Admin Time   midazolam (VERSED) injection 0.5-1 mg Medication Given Dose: 1 mg; Route: Intravenous Maria Luz Griffin RN 1/29/2018  6:30 PM   midazolam (VERSED) injection 0.5-1 mg Medication Given Dose: 1 mg; Route: Intravenous Maria Luz Griffin RN 1/29/2018  7:18 PM   nitroGLYcerin injection 100 mcg Medication Given Dose: 100 mcg; Route: INTRA-ARTERIAL; Scheduled Time:  7:45 PM Maria Luz Griffin RN 1/29/2018  7:41 PM       Sedation: IR Nurse Monitored Care   Post Procedure Summary:  Prior to the start of the procedure and with procedural staff participation, I verbally confirmed the patient s identity using two indicators, relevant allergies, that the procedure was appropriate and matched the consent or emergent situation, and that the correct equipment/implants were available. Immediately prior to starting the procedure I conducted the Time Out with the procedural staff and re-confirmed the patient s name, procedure, and site/side. (The Joint Commission universal protocol was followed.)  Yes       Sedatives: Fentanyl and Midazolam (Versed)    Vital signs, airway and pulse oximetry were monitored and remained stable throughout the procedure and sedation was maintained until the  procedure was complete.  The patient was monitored by staff until sedation discharge criteria were met.    Patient tolerance: Patient tolerated the procedure well with no immediate complications.    Time of sedation in minutes: 120 Minutes minutes from beginning to end of physician one to one monitoring.          Findings: large mass in right lobe of liver embolized    Estimated Blood Loss: Less than 10 ml    Fluoroscopy Time:  minute(s)    SPECIMENS: None    Complications: 1. None     Condition: Stable    Plan: return to ICU. Trend Hgb    Comments: See dictated procedure note for full details.    Huber Ernst MD

## 2018-01-30 NOTE — PROGRESS NOTES
POD 1 after bland embolization to the posterior divisional artery feeding a hemorrhaging right hepatic mass. Patient tolerated the procedure well, and has not had further signs of bleeding.  His intraoperative BP was very elevated.  1 mg midazolam resolved this.    He remains intubated. Right groin CDI.    Please continue to trend hgb. Please contact us if there is concern for re-bleed or if there is need for percutaneous biopsy once he has stabilized.    Huber Ernst MD  Vascular and Interventional Radiology Fellow  [facility]

## 2018-01-30 NOTE — PROGRESS NOTES
SURGICAL ICU PROGRESS NOTE  January 30, 2018      CO-MORBIDITIES:   No diagnosis found.    ASSESSMENT: Naseem Ferrer is a 62 year old man with PMH of COPD presented to an outside hospital with worsening abdominal pain over the past 2-3 weeks. Found to have a large liver lesion on CT, with hemoglobin of 9 (from baseline reported 16), concern for lesion rupture and bleeding (outside hospital CT with contrast did not demonstrate active extravasation).    CHANGES FOR TODAY:  - Add Seroquel scheduled 25mg BID  - PTT/INR and Hgb f/u  - Keep NPO  - PST this afternoon  - Add Folate PO  - D/c ativan component of MSSA  - D/c PRN metoprolol  - PICC line today    PLAN:  Neuro/ pain/ sedation:  Encephalopathy 2nd to mixed metabolic, and drug reaction with possible withdrawal  Alcohol abuse with current use  - Dilaudid and oxycodone prn for pain  - Wean propofol as able.  Will use propofol as primary agent to keep RASS 0- -1.  No benzos.    - Add Seroquel,   - Discussed with patient and wife at bedside, only drinks on weekends. Denies history of heavy drinking.   - IV thiamine x 5 days, add folate    Pulmonary care:   Acute hypercapnic respiratory failure  Hx COPD  - AC 18 * 450 + 5, PST this am lasted 20 minutes and failed 2nd to severe agitation.  - Wean FiO2/peep  - Lower threshold for SaO2  Keep  >88% okay given history of COPD   - Home inhalers and prn nebs   - Follow VBG PRN     Cardiovascular:    No acute issues  - Monitor hemodynamic status. Currently blood pressure within normal limits.  - Lactate normalized  - Labetalol PRN HTN/tach     GI/nutrition:   Malnutrition anisa/pro deficit  Liver mass  - NPO except ice chips and medications, will re-evaluate daily.  Chance of further procedures too high today.  - Zofran for nausea   - Workup for liver lesion, per surg/onc service       Renal/ Fluid Balance/Electrolytes:    Hyperkalemia  - Appears euvolemic  -  K ok today, monitor, no intervention needed  - Will monitor  intake and output.      Endocrine:    No active issues  - No history of diabetes       ID/ Antibiotics:  - No indication for antibiotics at this time       Heme:     Acute blood loss anemia  - Monitor hemoglobin, coagulation parameters, platelets  Received 3 RBC, 2 FFP since admission.    - If further evidence of bleeding would consider IR consult for embolization        Ref. Range 1/28/2018 21:48 1/29/2018 04:30 1/29/2018 14:11 1/29/2018 18:40 1/30/2018 00:31 1/30/2018 04:02 1/30/2018 10:44   Hemoglobin Latest Ref Range: 13.3 - 17.7 g/dL 9.2 (L) 8.0 (L) 6.4 (LL) 7.4 (L) 7.2 (L) 6.5 (LL) 7.5 (L)      Prophylaxis:    - Mechanical prophylaxis for DVT.   - No chemical DVT prophylaxis due to high risk of bleeding.  - GI ppx while NPO      Lines/ tubes/ drains:  - PICC  1/30      Disposition:  - Surgical ICU.     ====================================    SUBJECTIVE:   Intubated sedated, follows intermittent commands, does not nod to questions for any ROS    OBJECTIVE:   1. VITAL SIGNS:   Temp:  [96.9  F (36.1  C)-98.6  F (37  C)] 97.9  F (36.6  C)  Pulse:  [67] 67  Heart Rate:  [] 104  Resp:  [9-42] 18  BP: ()/() 109/51  FiO2 (%):  [40 %] 40 %  SpO2:  [81 %-100 %] 100 %  Ventilation Mode: CPAP/PS  FiO2 (%): 40 %  Rate Set (breaths/minute): 20 breaths/min  Tidal Volume Set (mL): 450 mL  PEEP (cm H2O): 5 cmH2O  Pressure Support (cm H2O): 7 cmH2O  Oxygen Concentration (%): 40 %  Resp: 18    2. INTAKE/ OUTPUT:   I/O last 3 completed shifts:  In: 3351.59 [I.V.:2473.59]  Out: 1085 [Urine:935; Emesis/NG output:150]    3. PHYSICAL EXAMINATION:   General: Sedate, wakes easily but very groggy and not very responsive  Neuro: moving all four extremities spontaneously. WANG  Resp: clear bilaterally without crackles, wheeze.    CV: RRR, S1 S2, No murmur  Abdomen: Soft, tender over RUQ, no peritoneal signs   Extremities: warm and well perfused    4. INVESTIGATIONS:   Complete Blood Count     Recent Labs  Lab  01/30/18  0402 01/30/18  0031 01/29/18  1840 01/29/18  1411 01/29/18  0430 01/28/18  2148   WBC 17.9*  --   --   --  23.0* 16.6*   HGB 6.5* 7.2* 7.4* 6.4* 8.0* 9.2*     --   --   --  284 286     Basic Metabolic Panel    Recent Labs  Lab 01/30/18  0402 01/29/18  1411 01/29/18  0736 01/29/18  0430 01/28/18  2148    139  --  139 136   POTASSIUM 4.9 5.0  5.0 5.4* 5.4* 4.5   CHLORIDE 107 106  --  105 104   CO2 28 27  --  28 25   BUN 28 25  --  19 15   CR 0.94 1.11  --  0.86 0.69   * 142*  --  128* 122*     Liver Function Tests    Recent Labs  Lab 01/28/18  2148   *   *   ALKPHOS 120   BILITOTAL 1.2   ALBUMIN 2.7*   INR 0.98     Coagulation Profile    Recent Labs  Lab 01/28/18 2148   INR 0.98   PTT 28     Lactate  0.8    5. RADIOLOGY:   Recent Results (from the past 24 hour(s))   XR Chest Port 1 View    Narrative    XR CHEST PORT 1 VW  1/28/2018 10:13 PM      HISTORY: Preop/baseline    COMPARISON: None    FINDINGS: Single portable frontal view of the chest at 60 degree.  There is a slight oblique projection, limiting evaluation. Normal  heart size. Pulmonary vasculature within normal limits. No focal  consolidation. There is a lucent tubular structure, deviating to the  left side of the mediastinum, presumably distended esophagus. No  pleural effusion or pneumothorax.      Impression    IMPRESSION:   1. Lungs are clear.  2. Presumably mildly distended esophagus, although evaluation is  limited by the suboptimal projection. May consider repeat PA and  lateral if clinically indicated.    I have personally reviewed the examination and initial interpretation  and I agree with the findings.    NABILA ESCAMILLA MD       =========================================      Patient seen, findings and plan discussed with surgical ICU staff Scarlet

## 2018-01-30 NOTE — PROGRESS NOTES
Pt intubated per anesthesia with 8.0 ETT @24cm. Vent per MD settings CMV 18 400 50 +5> will continue to follow.

## 2018-01-30 NOTE — PROGRESS NOTES
Patient Name: Naseem Ferrer  Medical Record Number: [id number]  Today's Date: 1/29/2018    Procedure: hepatic angiogram with possible intervention  Proceduralist: ,      Pt on propofol and fentanyl 1mg versed     Procedure start time: 1830  Puncture time: 1832      Report given to: DRISS Jain RN       Other Notes: Pt arrived to IR room 4 from  accompanied by spouse Miya who consented. Pt denies any questions or concerns regarding procedure. Pt positioned supine and monitored per protocol. Pt tolerated procedure without any noted complications. Pt transferred back to
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
## 2018-01-30 NOTE — PROGRESS NOTES
"SPIRITUAL HEALTH SERVICES  SPIRITUAL ASSESSMENT Progress Note (Palliative Focus)  [facility] ([place] 4A    REFERRAL SOURCE: Palliative consult service initial spiritual assessment.    Attempted visit with patient Naseem \"Saqib\" Carissa or wife Miya times two attempts. Saqib was not alert and Miya not in room. I consulted with Palliative SW Fell; Saqib was raised Taoism, Miya Jhaveri.     Plan: I will attempt to follow for spiritual support.    Leni John  Palliative   Pager [phone]  [facility] Inpatient Team Consult pager [phone] (M-F 8-4:30)  After-hours Answering Service [phone]   "

## 2018-01-30 NOTE — PROGRESS NOTES
Surgery Progress Note  1/30/2018     Subjective:  - Stable after IR embolization last night. Intubated and sedated.  - Did not require pRBC transfusion overnight    Objective:  Temp:  [96.9  F (36.1  C)-98.6  F (37  C)] 97.9  F (36.6  C)  Pulse:  [67] 67  Heart Rate:  [] 104  Resp:  [9-42] 18  BP: ()/() 109/51  FiO2 (%):  [40 %] 40 %  SpO2:  [81 %-100 %] 100 %  I/O last 3 completed shifts:  In: 3351.59 [I.V.:2473.59]  Out: 1085 [Urine:935; Emesis/NG output:150]    Gen: Intubated, sedated  Resp: CMV, RR 18, , Peep 5, FiO2 40%  Abd: Soft, nondistended  Ext: WWP    BMP  Recent Labs  Lab 01/30/18  0402 01/29/18  1411 01/29/18  0736 01/29/18  0430 01/28/18  2148    139  --  139 136   POTASSIUM 4.9 5.0  5.0 5.4* 5.4* 4.5   CHLORIDE 107 106  --  105 104   JACKELIN 7.7* 7.6*  --  7.8* 7.6*   CO2 28 27  --  28 25   BUN 28 25  --  19 15   CR 0.94 1.11  --  0.86 0.69   * 142*  --  128* 122*     CBC  Recent Labs  Lab 01/30/18  0402 01/30/18  0031 01/29/18  1840 01/29/18  1411 01/29/18  0430 01/28/18  2148   WBC 17.9*  --   --   --  23.0* 16.6*   RBC 2.14*  --   --   --  2.57* 2.96*   HGB 6.5* 7.2* 7.4* 6.4* 8.0* 9.2*   HCT 20.8*  --   --   --  26.4* 29.7*   MCV 97  --   --   --  103* 100   MCH 30.4  --   --   --  31.1 31.1   MCHC 31.3*  --   --   --  30.3* 31.0*   RDW 16.1*  --   --   --  13.8 13.7     --   --   --  284 286     INR  Recent Labs  Lab 01/28/18 2148   INR 0.98      AST/ALT & Alk Phos  Recent Labs  Lab 01/28/18 2148   *   *   ALKPHOS 120     Bili  Recent Labs   Lab Test  01/28/18 2148   BILITOTAL  1.2     Lipase/AmlyaseNo lab results found in last 7 days.    A/P: Naseem Ferrer is a 62 year old male with COPD and a newly diagnosed liver mass. The etiology of the mass is unclear. S/P IR visceral embolization (1/29)    - Agree with continued Hgb checks, and transfuse if Hgb<7  - Vent cares per ICU team  - No anti-coagulation at this time  - Nutrition per  abhi STOVALL for enteral feeding from surgery  - Surg Onc consulted  - IR consulted    D/w Dr. Munoz, chief resident, and Dr. Carter, staff.    Mariluz Reid DO  PGY1

## 2018-01-30 NOTE — PLAN OF CARE
Problem: Patient Care Overview  Goal: Plan of Care/Patient Progress Review  Outcome: Declining  N: Sedated with propofol gtt. Fentanyl gtt started for pain. Prior to intubation, patient was intermittently confused.   R: Patient intubated this evening based on ABG results with failure to improve on BIPAP.   CV: Sinus tach, BP stable. D5 1/2 gtt started for MIVF.   GI/: PRN dilaudid for ongoing abdominal pain. [place] placed. No BM today.   Lab: HGB dropped to 6.4 - 2 units PRBC and 2 units FFP given. HGB recheck and repeat ABG to be completed in IR or when patient returns. Creatinine trending up. K decreased to 5 from 5.4

## 2018-01-30 NOTE — PROGRESS NOTES
Surgical Oncology Progress Note    Interval History:  IR embolization late evening. Hgb down to 6.5 this morning.     Physical Exam:   Temp:  [96.9  F (36.1  C)-98.6  F (37  C)] 96.9  F (36.1  C)  Pulse:  [67] 67  Heart Rate:  [] 66  Resp:  [9-42] 18  BP: ()/() 97/57  FiO2 (%):  [40 %] 40 %  SpO2:  [81 %-100 %] 99 %  General: NAD.  Respiratory: intubated   Abdomen: Abdomen is soft, distended.     Data:   All laboratory and imaging data in the past 24 hours reviewed  I/O last 3 completed shifts:  In: 3351.59 [I.V.:2473.59]  Out: 1085 [Urine:935; Emesis/NG output:150]  Recent Labs   Lab Test  01/30/18   0402  01/30/18   0031  01/29/18   1840   01/29/18   0430  01/28/18   2148   WBC  17.9*   --    --    --   23.0*  16.6*   HGB  6.5*  7.2*  7.4*   < >  8.0*  9.2*   PLT  221   --    --    --   284  286   INR   --    --    --    --    --   0.98    < > = values in this interval not displayed.      Recent Labs   Lab Test  01/30/18   0402  01/29/18   1411  01/29/18   0736  01/29/18   0430   NA  140  139   --   139   POTASSIUM  4.9  5.0  5.0  5.4*  5.4*   CHLORIDE  107  106   --   105   CO2  28  27   --   28   BUN  28  25   --   19   CR  0.94  1.11   --   0.86   ANIONGAP  6  5   --   6   JACKELIN  7.7*  7.6*   --   7.8*   GLC  156*  142*   --   128*     Recent Labs   Lab Test  01/28/18 2148   PROTTOTAL  6.5*   ALBUMIN  2.7*   BILITOTAL  1.2   ALKPHOS  120   AST  418*   ALT  121*       Assessment and Plan:     Naseem Ferrer is a 62 year old male with COPD and a newly diagnosed liver mass with intraperitoneal hemorrhage s/p IR embolization 1/29.     -Agree with pRBC transfusion  -There is no role for hepatectomy in the current setting. Will eventually need a biopsy of the mass.   -Surg Onc to follow    Priya Pugh PA-C   Surgical Oncology

## 2018-01-30 NOTE — CONSULTS
IR Consulted for active hemorrhage in this 62 year old male without history of malignancy who has a large mass in the right lobe on CT and MRI.  His hgb dropped from 8 to 6.4 since early this AM (~10 hr time period).  He had been stable and we recommended a CTA to find active extravasation.  He decompensated, was intubated, and is being transfused 2 u PRBCs.    He is currently hemodynamically stable and an appropriate candidate for angiogram.  We plan to bland embolize as subselectively as we can.

## 2018-01-30 NOTE — PROGRESS NOTES
"[facility], [place]   Palliative Care Daily Progress Note          Recommendations, Patient/Family Counseling & Coordination     Pt seen and examined- reviewed with primary SICU service. Last evening pt had semi emergent embolization of large liver mass for dropping Hgb. and probable active bleeding. He deteriorated to the point of requiring intubation/sedation for hypoxia.   Concern for ETOH withdrawal as a component of his sx persist. Chart notes indicate no hx of \"heavy drinking.\"   REC: - sx management with sedation infusions per primary team.  - his history of chemical dependency treatment X2 and spouse concern about recent increased consumption raise possibility of ETOH withdrawal as an aspect of his condition.  -  low dose opiates when off sedation for abd pain.  - Palliative counseling staff and  to see for support.     POLST- not complete at this time     Thank you for the opportunity to continue to participate in the care of this patient and family.  Please feel free to contact on-call palliative provider with any emergent needs.  We can be reached via team pager [phone] (answered 8-4:30 Monday-Friday); after-hours answering service ([phone])    Osman Ervin, KATARINA BEARDEN NP ACHMICHAEL  Nurse Practitioner- Lead Advanced Practice Provider  [facility] Palliative Medicine Consult Service   [phone]    TT spent: 35 minutes of which 20 minutes were spent in direct face to face contact with patient/family. Greater than 50% of time spent counseling and/or coordinating care.       Assessment      1) Diagnoses & symptoms:        COPD- not 02 dependent at home  Liver mass- unclear etiology- bleeding limits biopsy options at present. ? HCC  Anemia related to large liver mass.- POD#1 IR embolization procedure - transfusion per primary team     ETOH abuse- has been through treatment x2.- no history of recent excess consumption or prior seizure/withdrawl per " "spouse           2)  Psychosocial/Spiritual Needs:   Ongoing:Will continue to follow  New:No        Is new assessment/intervention required by palliative team?: No         Interval History:   See above           Review of Systems:   Palliative Symptom Review (0=no symptom/no concern, 1=mild, 2=moderate, 3=severe): Pt intubated and sedated- unable to participate in ROS.                 Medications:   I have reviewed this patient's medication profile and medications given in past 24 hours.       Physical exam: Vitals: BP 93/60  Pulse 67  Temp 97.8  F (36.6  C) (Axillary)  Resp 19  Ht 1.638 m (5' 4.5\")  Wt 66.7 kg (147 lb)  SpO2 99%  BMI 24.84 kg/m2  BMI= Body mass index is 24.84 kg/(m^2).   General: In ICU bed. Intubated and sedated. Wife present at bedside.   HEENT: NC/AT,Injected conjunctiva. Non icteric. MM mildly dry  Pulm: mechanical breath sounds. R 16. 40% Fi02 with 5 PEEP.  CV: RRR Sinus rhythm on monitor- rate in 90's. S1 S2 w/o Murmur  Abdomen: soft, diffusely distended.  Musculoskel/Extremities: sedated and in ICU bed. Trace mandie LE edema, no erythema  Skin: no exposed rashes, no diaphoresis and skin color normal              Data Reviewed:   ROUTINE LABS (Last four results)  BMP  Recent Labs  Lab 01/30/18  0402 01/29/18  1411 01/29/18  0736 01/29/18  0430 01/28/18  2148    139  --  139 136   POTASSIUM 4.9 5.0  5.0 5.4* 5.4* 4.5   CHLORIDE 107 106  --  105 104   JACKELIN 7.7* 7.6*  --  7.8* 7.6*   CO2 28 27  --  28 25   BUN 28 25  --  19 15   CR 0.94 1.11  --  0.86 0.69   * 142*  --  128* 122*     CBC  Recent Labs  Lab 01/30/18  1044 01/30/18  0402 01/30/18  0031 01/29/18  1840  01/29/18  0430 01/28/18  2148   WBC  --  17.9*  --   --   --  23.0* 16.6*   RBC  --  2.14*  --   --   --  2.57* 2.96*   HGB 7.5* 6.5* 7.2* 7.4*  < > 8.0* 9.2*   HCT  --  20.8*  --   --   --  26.4* 29.7*   MCV  --  97  --   --   --  103* 100   MCH  --  30.4  --   --   --  31.1 31.1   MCHC  --  31.3*  --   --   --  " 30.3* 31.0*   RDW  --  16.1*  --   --   --  13.8 13.7   PLT  --  221  --   --   --  284 286   < > = values in this interval not displayed.  INR  Recent Labs  Lab 01/30/18  1217 01/28/18  2148   INR 1.01 0.98

## 2018-01-30 NOTE — PROGRESS NOTES
D: Liver lesion  I/A: Pt unable to follow commands.  Attempted to PS for potential extubation in AM, propofol decreased.  20 minutes into trial, pt became very agitated, not following commands and attempting to get out of bed. Pt switched to normal vent settings and sedation restarted. MD's aware. Fentanyl @100mcg and propofol @ 25mcg.  HR 60-110s.  MAPs > 65.  Abdomen rounded and firm. PICC line placed. UOP 50-60ml/hr.      At 1620, propofol turned off and pt PS for 1hr.  Tolerated well.  Propofol continued after PS trial.      P: Continue to monitor and follow plan of care. Notify team of changes in medical status.

## 2018-01-30 NOTE — PLAN OF CARE
"Problem: Patient Care Overview  Goal: Plan of Care/Patient Progress Review  Outcome: No Change  D/A:  62 year old man with PMH of COPD presented to an outside hospital with worsening abdominal pain over the past 2-3 weeks. Found to have a large liver lesion on CT, with hemoglobin of 9 (from baseline reported 16), concern for lesion rupture and bleeding (outside hospital CT with contrast did not demonstrate active extravasation). POD #1 of large mass in right lobe of liver embolized Blood pressure 97/57, pulse 67, temperature 96.9  F (36.1  C), temperature source Axillary, resp. rate 18, height 1.638 m (5' 4.5\"), weight 66.7 kg (147 lb), SpO2 99 %.    Neuro: pt intubated but when awoken very anxious   CV: NSR with rare ectopy noted  Pulm: LS diminished and course CMV 40% 18/400/5  GI: abd distended BS are hypoactive, OG to LIS green/bile  : bray in place with dark enriqueta color  Skin: R groin site CDI and no hematoma noted, pale/ashen/mottled, very diaphoretic   Gtts: D5 .45NS 100, Propofol, fentynal  Lines: PIV x3, OG  Other: pt is very anxious when awoken, MSSA started and at 0000 scored 14 and received 5mg Valium , restraints in place    R: Pt resting between cares   P: Continue with POC      "

## 2018-01-31 NOTE — PROGRESS NOTES
[facility], [place]   Palliative Care Daily Progress Note          Recommendations, Patient/Family Counseling & Coordination     Pt seen and examined- family not present today- chart indicates he is more stable since semi emergent embolization of large liver mass for dropping Hgb. and probable active bleeding 1/29. Has required some transfusions-Hgb today is 8.5<--6.8.   He has been intolerant of ventilator pressure support trials thus far but is more wakeful and aware today.    REC: - sx management with ICU team as weaning sedation infusions per primary team.  -  low dose opiates prn when off sedation for abd pain .  - Palliative counseling staff and  following for support.     POLST- not complete at this time     Thank you for the opportunity to continue to participate in the care of this patient and family.  Please feel free to contact on-call palliative provider with any emergent needs.  We can be reached via team pager [phone] (answered 8-4:30 Monday-Friday); after-hours answering service ([phone])    KATARINA Mcconnell NP ACHMICHAEL  Nurse Practitioner- Lead Advanced Practice Provider  [facility] Palliative Medicine Consult Service   [phone]    TT spent: 25 minutes of which 10 minutes were spent in direct face to face contact with patient/family. Greater than 50% of time spent counseling and/or coordinating care.       Assessment      1) Diagnoses & symptoms:        COPD- not 02 dependent at home  Liver mass- unclear etiology- bleeding limits biopsy options at present. ? HCC  Anemia related to large liver mass.- POD#2 IR embolization procedure - transfusion per primary team   ETOH use/abuse- has been through treatment x2.- no history of recent excess consumption or prior seizure/withdrawl per spouse           2)  Psychosocial/Spiritual Needs:   Ongoing:Will continue to follow  New:No        Is new assessment/intervention required by  "palliative team?: No         Interval History:   See above           Review of Systems:   Palliative Symptom Review (0=no symptom/no concern, 1=mild, 2=moderate, 3=severe): Pt intubated and sedated- unable to participate in ROS as he remains intubated and lethargic.                 Medications:   I have reviewed this patient's medication profile and medications given in past 24 hours.       Physical exam: Vitals: /69  Pulse 67  Temp 100  F (37.8  C) (Axillary)  Resp (!) 32  Ht 1.638 m (5' 4.5\")  Wt 72.5 kg (159 lb 13.3 oz)  SpO2 96%  BMI 27.01 kg/m2  BMI= Body mass index is 27.01 kg/(m^2).   General: In ICU bed. Intubated. Wife not at bedside.   HEENT: NC/AT. Non icteric sclera. MM mildly dry  Pulm: mechanical breath sounds. R 20-24   CV: RRR Sinus rhythm on monitor- rate in 80's. S1 S2 w/o murmur  Abdomen: soft, diffusely distended.  Musculoskel/Extremities: appears to move all extremities in ICU bed. Trace mandie LE edema, no erythema  Skin: no exposed rashes, no diaphoresis and skin color normal              Data Reviewed:   ROUTINE LABS (Last four results)  BMP    Recent Labs  Lab 01/31/18  0408 01/30/18  0402 01/29/18  1411 01/29/18  0736 01/29/18  0430    140 139  --  139   POTASSIUM 4.3 4.9 5.0  5.0 5.4* 5.4*   CHLORIDE 107 107 106  --  105   JACKELIN 7.6* 7.7* 7.6*  --  7.8*   CO2 27 28 27  --  28   BUN 24 28 25  --  19   CR 0.71 0.94 1.11  --  0.86   * 156* 142*  --  128*     CBC  Recent Labs  Lab 01/31/18  1107 01/31/18  0408 01/30/18  2112 01/30/18  1607  01/30/18  0402  01/29/18  0430 01/28/18  2148   WBC  --  12.9*  --   --   --  17.9*  --  23.0* 16.6*   RBC  --  2.30*  --   --   --  2.14*  --  2.57* 2.96*   HGB 8.5* 6.8* 7.3* 7.2*  < > 6.5*  < > 8.0* 9.2*   HCT  --  21.6*  --   --   --  20.8*  --  26.4* 29.7*   MCV  --  94  --   --   --  97  --  103* 100   MCH  --  29.6  --   --   --  30.4  --  31.1 31.1   MCHC  --  31.5  --   --   --  31.3*  --  30.3* 31.0*   RDW  --  17.0*  --   -- "   --  16.1*  --  13.8 13.7   PLT  --  231  --   --   --  221  --  284 286   < > = values in this interval not displayed.  INR    Recent Labs  Lab 01/30/18  1217 01/28/18  1637   INR 1.01 0.98

## 2018-01-31 NOTE — PROGRESS NOTES
D: Liver lesion, day 2 post liver embolism     I/A: Following commands. Denies numbness/ tingling, CMS intact. Attempted to PS this AM for a couple of hours. Vent settings adjusted as well as propofol and fent stopped. Unable to tolerate being off of the vent. Patient became quite agitated and uncomfortable in addition to producing more oral and inline secretions. Fentanyl resumed at 75 and mechanical vent resumed to settings prior to trial. MAP's > 65. T max 100. Abd round and distended. PICC line with leaking grey lumen. PICC nurse assessed and determined that a rewire is required. Lasix 40 mg given x2 - urine was cloudy at the beginning of the shift and is now dark enriqueta. New OG placed. Hgb maintaining in the 8's. Phos replaced with good level.    P: Obtained bowel medications. Waiting for PICC to rewired so that xray can be done at the same time for the PICC and the OG.

## 2018-01-31 NOTE — PROGRESS NOTES
Surgery Progress Note  1/30/2018     Subjective:  - Did require another pRBC transfusion overnight for Hgb of 6.8    Objective:  Temp:  [97.6  F (36.4  C)-100.9  F (38.3  C)] 100.6  F (38.1  C)  Heart Rate:  [] 72  Resp:  [19-20] 20  BP: ()/() 107/65  FiO2 (%):  [30 %-40 %] 30 %  SpO2:  [93 %-100 %] 96 %  I/O last 3 completed shifts:  In: 2672.19 [I.V.:2672.19]  Out: 1945 [Urine:1345; Emesis/NG output:600]    Gen: Intubated, sedated  Resp: intubated  Abd: Soft, tense, distended  Ext: WWP    BMP    Recent Labs  Lab 01/31/18  0408 01/30/18  0402 01/29/18  1411 01/29/18  0736 01/29/18  0430    140 139  --  139   POTASSIUM 4.3 4.9 5.0  5.0 5.4* 5.4*   CHLORIDE 107 107 106  --  105   JACKELIN 7.6* 7.7* 7.6*  --  7.8*   CO2 27 28 27  --  28   BUN 24 28 25  --  19   CR 0.71 0.94 1.11  --  0.86   * 156* 142*  --  128*     CBC  Recent Labs  Lab 01/31/18  0408 01/30/18  2112 01/30/18  1607 01/30/18  1044 01/30/18  0402  01/29/18  0430 01/28/18  2148   WBC 12.9*  --   --   --  17.9*  --  23.0* 16.6*   RBC 2.30*  --   --   --  2.14*  --  2.57* 2.96*   HGB 6.8* 7.3* 7.2* 7.5* 6.5*  < > 8.0* 9.2*   HCT 21.6*  --   --   --  20.8*  --  26.4* 29.7*   MCV 94  --   --   --  97  --  103* 100   MCH 29.6  --   --   --  30.4  --  31.1 31.1   MCHC 31.5  --   --   --  31.3*  --  30.3* 31.0*   RDW 17.0*  --   --   --  16.1*  --  13.8 13.7     --   --   --  221  --  284 286   < > = values in this interval not displayed.  INR    Recent Labs  Lab 01/30/18  1217 01/28/18 2148   INR 1.01 0.98      AST/ALT & Alk Phos    Recent Labs  Lab 01/28/18 2148   *   *   ALKPHOS 120     Bili  Recent Labs   Lab Test  01/28/18 2148   BILITOTAL  1.2     Lipase/AmlyaseNo lab results found in last 7 days.    A/P: Naseem Ferrer is a 62 year old male with COPD and a newly diagnosed liver mass. The etiology of the mass is unclear. S/P IR visceral embolization (1/29)    No role for surgical intervention at this  time.     - Agree with continued Hgb checks, and transfuse if Hgb<7  - Vent cares per ICU team  - No anti-coagulation at this time  - Nutrition per SICU, okay for enteral feeding from surgery  - Surg Onc consulted  D/w Dr. Munoz, chief resident, and Dr. Carter, staff.    Reji Ladd MD  PGY-1 Surgery  pager [phone]

## 2018-01-31 NOTE — PLAN OF CARE
Problem: Patient Care Overview  Goal: Plan of Care/Patient Progress Review  PT 4A:  Cancel PT today; orders received and acknowledged.  Per discussion with RN, pt vent weaning and with tentative plans to extubate this PM.  Will reassess 2/1 for evaluation

## 2018-01-31 NOTE — PLAN OF CARE
Problem: Patient Care Overview  Goal: Plan of Care/Patient Progress Review  Outcome: No Change  D/I: Patient on unit 4A Surgical/Neuro ICU   Neuro- Pupils equal and reactive. Purposeful movement of all extremities.  CV- Tele: NSR. BPs soft; stable  Pulm- LS clear; diminished bases.   GI- BS active. Abd rounded; firm. No BM overnight  Gtts- Fent infusing at 75mcg/hr. Propofol at 25mcg/kg/min  Skin- R groin site: CDI  Pain- Appears comfortable.  IV's - R PICC leaking early in shift; saline locked. IVs infusing through PIVs.  1 unit PRBCs infusing for hgb 6.8.  See flow sheets for further interventions and assessments.   A: Stable   P: Continue to monitor pt closely. Notify MD of significant changes

## 2018-01-31 NOTE — PROGRESS NOTES
"SPIRITUAL HEALTH SERVICES  SPIRITUAL ASSESSMENT Progress Note (Palliative Focus)  [facility] ([place]) 4A    REFERRAL SOURCE: Palliative consult service initial spiritual assessment.    Visit with patient Naseem \"Saqib\" Carissa and wife Miya at bedside. Saqib was intubated and awake, nodded during visit. Miya said prayer was important to her (Anabaptism) and requested prayer for \"progress today\". Miya was tearful at times, and said it was hard to be in hospital; she was also optimistic with Saqib, encouraging him to \"keep breathing\" and \"get the tube out today\".    Plan: I will follow for spiritual support.    Leni John  Palliative   Pager [phone]  [facility] Inpatient Team Consult pager [phone] (M-F 8-4:30)  After-hours Answering Service [phone]   "

## 2018-01-31 NOTE — PROGRESS NOTES
CLINICAL NUTRITION SERVICES - ASSESSMENT NOTE     Nutrition Prescription    RECOMMENDATIONS FOR MDs/PROVIDERS TO ORDER:  Recommend if approp to receive meds via OGT, order enteral Phos supplementation (NeutraPhos 1 pkt TID-QID) in addition to IV replacement.    Malnutrition Status:    (at least) Non-severe malnutrition in the context of acute illness (and possibly social/environmental circumstances pending severity of EtOH abuse - see below)    Recommendations already ordered by Registered Dietitian (RD):  None at this time.     Future/Additional Recommendations:  1.  If/when to pursue FT placement, Phos > 1.9 and K+/Mg++ WNL, begin TFs with:   --TwoCal HN @ 10 ml/hr and adv by 10 ml q 8 hrs to initial goal @ 40 mL/hr (960 mL/day) to provide 1920 kcals (29+ kcal/kg/day), 81 g PRO (1.2+ g/kg/day), 672 mL H2O, 210 g CHO and 5 g Fiber daily.   --Prosource 1 pkt TID (3 pkts/day = 120 kcals, 33 g PRO)  **Two Jonathan + Prosource will provide a total of 2040 kcals (30 kcal/kg) and 114 g PRO (1.7 g/kg/day)    2.  If/when begin TF, order multivitamin/mineral (15 ml/day via FT) to help ensure micronutrient needs being met with suspected hypermetabolic demands and potential interruptions to TF infusions.   --Recommend continue Thiamine until TFs advance to goal and no further sx of refeeding anticipated.  Consider change to enteral route and suspension if/when approp.    3.  If/when begin TFs, order to HOLD start and advance of TF rate if K+/Mg++ < nrml and Phos <2 mg/dL.  Ensure K+/Mg++/Phos ordered daily until TFs adv to goal infusion to evaluate for sx of refeeding with nutrition received, need for lyte replacement per already-ordered lyte protocols and approp to continue to adv nutritional provisions.     4.  If/when TF advances to goal and if pt remains on the vent, order metabolic cart study to better evaluate REE (kcal) needs and approp of energy provisions.           REASON FOR ASSESSMENT  Naseem Ferrer is a/an 62 year  "old male assessed by the dietitian for Interdisciplinary Rounds and Provider Order - Registered Dietitian to Assess and Order TF per Medical Nutrition Therapy Protocol and FT placement consult (Em)    NUTRITION HISTORY  -Per H&P, pt with abd pain x2-3 wks PTA.  Also noted hx of EtOH use (reported 5-6 drinks on w/e, minimal during the week).    -Unable to obtain hx from pt at this time and wife currently not available/not at bedside.    CURRENT NUTRITION ORDERS  -Diet: NPO (since 1/28/18; noted intubated since 1/29)    -Enteral access: OGT    -MIVF: D5 1/2 NS @ 100 ml/hr    LABS  Phos 1.7 mg/dL (today) - anticipate could be r/t hepatic status (S/p embolization of large mass in right lobe of liver on 1/29) and/or related to EtOH abuse hx.    Nutric Score: 4     MEDICATIONS  Thiamine ( mg/day) - started 1/29 for EtOH hx and suspect withdrawl  Folate (1 mg tablet via OGT) - started 1/30 for EtOH hx and suspect withdrawl    ANTHROPOMETRICS  Height: 163.8 cm (5' 4.5\")  Most Recent Weight: 72.5 kg (today, 1/31/2018), 66.7 kg (upon adm 1/28/18)  IBW: 60 kg (@ 111%)  BMI: 24.9 kg/m2 = Normal BMI  Weight History: Unable to obtain hx from pt at this time and wife currently not at bedside.  Wt Readings from Last 10 Encounters:   01/31/18 72.5 kg (159 lb 13.3 oz)   1/12/18 65.3 kg (per Care Everywhere)     Dosing Weight: 67 kg (adm/actual) - 66.7 kg (1/28/18)    ASSESSED NUTRITION NEEDS  Estimated Energy Needs: 4206-6838 kcals/day (25 - 30 kcals/kg)  Justification: Maintenance  Estimated Protein Needs: + grams protein/day (1.2 - 1.5+ grams of pro/kg)  Justification: Hypercatabolism with critical illness  Estimated Fluid Needs: (1 mL/kcal) for Maintenance or Per provider pending fluid status    PHYSICAL FINDINGS  See malnutrition section below.  No abnormal nutrition-related physical findings observed. Limited evaluation of oral cavity with ETT in place.    MALNUTRITION  % Intake: Decreased intake does not meet " criteria/Unable to assess (NPO x4 days; diet intake hx PTA unknown)  % Weight Loss: Unable to assess  Subcutaneous Fat Loss: Facial region and Thoracic/intercostal:  Possibly Mild  Muscle Loss: Temporal, Thoracic region (clavicle, acromium bone, deltoid, trapezius, pectoral), Upper arm (bicep, tricep) and Upper leg (quadricep, hamstring):  Possibly Mild  Fluid Accumulation/Edema: None noted  Malnutrition Diagnosis: (at least) Non-severe malnutrition in the context of acute illness (and possibly social/environmental circumstances pending severity of EtOH abuse)    NUTRITION DIAGNOSIS  Inadequate protein-energy intake r/t unclear if abd pain/GI status significantly impacting oral intakes PTA and now intubation inhibiting ability to resume oral diet with no nutrition therapy started AEB NPO since adm (x3-4 d) in pt with possible mild fat/muscle depletion and unclear diet intake hx (quality/quantity) PTA given possible EtOH use/abuse and abd pain x2-3 wks PTA.    INTERVENTIONS  Implementation  1.  Nutrition Education: Not appropriate at this time due to patient condition     2.  Collaboration and Referral of Nutrition care - Discussed plan for FEN/GI/Resp/Heme on rounds with Providers who plan for PS trails again for possible extubation and watching Hgb for questionable need for possible surgical intervention (so do not desire to begin TF via OGT if needs emergent surgery).  Given above Nutric score, will hold off on starting alternative CPN today but if unable to extubate in the next day, may consider post-pyloric FT to begin TF therapy (and ability to feed enterally via small bowel given anticipate ongoing monitoring for possible surgical intervention).    Goals  1.  Diet adv v nutrition support within 1-2 days.    2.  If/when begin nutrition therapy, goal for intakes would be a minimum 25 kcal/kg/day and 1.2 g PRO/kg/day (per 67 kg).     Monitoring/Evaluation  Progress toward goals will be monitored and evaluated per  protocol.     María Elena Jensen ,ELY, LD, [facility] (4A SICU pgr [phone])

## 2018-01-31 NOTE — PROGRESS NOTES
Social Work: Assessment with Discharge Plan    Patient Name:  Naseem Ferrer  :  1955  Age:  62 year old  MRN:  [id number]  Completed assessment with:  Pt's wife-Miya, attended rounds, chart review     Presenting Information   Reason for Referral:  Insurance questions  Date of Intake:  2018  Referral Source:  Nurse and Family  Decision Maker:  Patient at baseline.   Alternate Decision Maker:  Pt's wife-Miya would be alternate decision-maker per [facility] policy.   Health Care Directive: None filed; pt not appropriate to complete at this time.   Living Situation:  House with wife.  Previous Functional Status:  Independent  Patient and family understanding of hospitalization:  Restorative   Cultural/Language/Spiritual Considerations:  English-speaking   Adjustment to Illness:  Unable to assess pt; pt's wife has been tearful and overwhelmed, wanting to be proactive about planning for pt and their family.     Physical Health  Reason for Admission:  No diagnosis found.  Services Needed/Recommended:  TBD, pending hospital course.     Mental Health/Chemical Dependency  Diagnosis:  None listed in Problem List. Per ANGELA Godoy's note and discussion with wife, pt and wife have experienced much trauma and grief in their lives.   Support/Services in Place:  Did not address.  Services Needed/Recommended:  NA at this time.     Support System  Significant relationship at present time:  Wife   Family of origin is available for support:  Pt has two adult daughters, but it sounds that his relationship with each of them is strained. Pt also has a 7-year-old daughter with wife, Miya.   Other support available:  Unknown.  Gaps in support system:  Pt's wife reported that pt is a vet but is not connected to VA benefits and wondered about this.   Patient is caregiver to:  Pt and wife have a 7-year-old daughter, Sondra.      Provider Information   Primary Care Physician:  No primary care provider on file.  None   Clinic:  No primary physician on file.      :  Unknown     Financial   Income Source:  Unclear if pt is employed at this time. Pt's wife reported that she does .   Financial Concerns:  Pt's wife worried about insurance coverage for hospitalization and needs post-d/c.   Insurance:    Payor/Plan Subscriber Name Rel Member # Group #   [employer] CARA FARFAN  [id number]      [address]       Discharge Plan   Patient and family discharge goal:  Pt's wife expressed concern about getting assistance connecting to resources for taking care of pt once he returns home.    Provided education on discharge plan: Briefly discussed potential rehab needs at d/c and placement for this.  Barriers to discharge:  Medical stability     Discharge Recommendations   Anticipated Disposition:  TBD, pending hospital course.  Transportation Needs:  TBD  Name of Transportation Company and Phone:  TBD     Additional comments   Met with pt's wife in family lounge per her request. Pt's wife expressed concern about pt's insurance coverage and whether they would receive costly bills post hospitalization. She also worried about pt's needs once he returns home (transportation to appts, assistance with cares at home, etc.). She discussed how they experienced the death of a child and were very proactive in planning for this, which she found helpful for coping.  She stated that she wants to make sure she is proactive about pt's needs for d/c, as well. Discussed connecting pt's wife with [employer] [facility] [employer] [facility] to follow up on specific questions. Also discussed looking into further community/[facility] resources (food assistance, energy assistance, etc.). Pt's wife discussed that pt is a vet and wondered if there are benefits through the [facility] for which he may be eligible. She did say that pt had a difficult time in the  and does not think back fondly on this experience, has not wanted  anything to do with the VA. She stated that although he feels this way, she thinks they need to look into all the options and benefits they may have available to them so they can plan for their family's needs. She became tearful discussing their history and her fears about pt's recovery. We briefly discussed possible rehab options and potential insurance coverage for this, as well. Pt's wife stated that all of her questions had been answered at this time and thanked  for stopping by. Stopped back a bit later and provided pt's wife with [employer] [facility], as well as information on [employer] to Benefits to help identify additional benefits for which they may be eligible. Pt's wife discussed how she doesn't do well with reading and will likely work with her sister, who she said works in insurance, to help her go through the information and navigate the Bridge to Benefits website. She thanked  for the information.     BRANNON Hanks, [facility]   Pager: [phone]  [email or web address]     NO LETTER

## 2018-01-31 NOTE — PROGRESS NOTES
"Surgical Oncology Progress Note    No acute issues overnight. Hgb drifted to 6.8 this AM from 7.3 last check. Failed pressure support weaning x2. On MSSA and scores are requiring benzos.     /62  Pulse 67  Temp 100.9  F (38.3  C) (Axillary)  Resp 20  Ht 1.638 m (5' 4.5\")  Wt 72.5 kg (159 lb 13.3 oz)  SpO2 96%  BMI 27.01 kg/m2  General: NAD.  Respiratory: intubated   Abdomen: Abdomen is soft, distended.     A/P: Naseem Ferrer is a 62 year old male with COPD and a newly diagnosed liver mass with intraperitoneal hemorrhage s/p IR embolization 1/29.   -Agree with pRBC transfusion  -There is no role for hepatectomy in the current setting. Will eventually need a biopsy of the mass.   -Surg Onc to follow    Seen and discussed with chief resident who will discuss with staff    Abrahan Jimenez MD  General Surgery PGY-3    "

## 2018-01-31 NOTE — PROGRESS NOTES
SURGICAL ICU PROGRESS NOTE  January 31, 2018    CO-MORBIDITIES:   No diagnosis found.    ASSESSMENT: Naseem Ferrer is a 62 year old man with PMH of COPD presented to an outside hospital with worsening abdominal pain over the past 2-3 weeks. Found to have a large liver lesion on CT, with hemoglobin of 9 (from baseline reported 16), concern for lesion rupture and bleeding (outside hospital CT with contrast did not demonstrate active extravasation). Patient had Hgb drop with worsening respiratory status requiring intubation and urgent IR embolization 01/29.     CHANGES FOR TODAY:  - Pressure support trial, recheck ABG  - Start high-range phos replacement protocol  - d/c propofol  - d/c acetaminophen  - TEG study  - Lasix 40mg IV one time then reassess    PLAN:  Neuro/ pain/ sedation:  Encephalopathy 2nd to mixed metabolic, and drug reaction with possible withdrawal  Alcohol abuse with current use  - Dilaudid and oxycodone prn for pain  - Restart fentanyl gtt given increased pain and respiratory distress 2/2 pain  - Continue Seroquel 25 mg BID   - IV thiamine and folate    Pulmonary care:   Acute hypercapnic respiratory failure  Hx COPD  - AC 20/450 + 5, PST this am failed 2/2 severe agitation. Will repeat PST later today  - ABG to be obtained after starting PST  - Wean FiO2/peep  - Lower threshold for SaO2  Keep  >88% okay given history of COPD   - Home inhalers and prn nebs       Cardiovascular:    No acute issues  - Monitor hemodynamic status. Currently blood pressure within normal limits.  - Lactate normalized  - Labetalol PRN HTN/tach     GI/nutrition:   Malnutrition anisa/pro deficit  Liver mass  - NPO except ice chips and medications.   - Zofran for nausea   - Workup for liver lesion, per surg/onc service        Renal/ Fluid Balance/Electrolytes:    Hyperkalemia  - Appears euvolemic  - Will monitor intake and output.  - Hogue in place for strict I&Os       Endocrine:    No active issues  - No history of  diabetes        ID/ Antibiotics:  - No indication for antibiotics at this time        Heme:     Acute blood loss anemia  - Monitor hemoglobin, coagulation parameters, platelets  Received 4 RBC, 2 FFP since admission.    - If further evidence of bleeding would consider IR consult for embolization                                                                                                                     Prophylaxis:    - Mechanical prophylaxis for DVT.   - No chemical DVT prophylaxis due to high risk of bleeding.  - GI ppx while NPO       Lines/ tubes/ drains:  - PICC  1/30       Disposition:  - Surgical ICU.       ====================================    SUBJECTIVE:   No acute events overnight. Hgb came back 6.8, received 1u pRBC. Underwent PST and failed due to agitation.     OBJECTIVE:   1. VITAL SIGNS:   Temp:  [97.6  F (36.4  C)-100.9  F (38.3  C)] 100.9  F (38.3  C)  Heart Rate:  [] 75  Resp:  [19-20] 20  BP: ()/() 105/57  FiO2 (%):  [30 %-40 %] 30 %  SpO2:  [93 %-100 %] 96 %  Ventilation Mode: CMV/AC  FiO2 (%): 30 %  Rate Set (breaths/minute): 20 breaths/min  Tidal Volume Set (mL): 450 mL  PEEP (cm H2O): 5 cmH2O  Pressure Support (cm H2O): 7 cmH2O  Oxygen Concentration (%): 30 %  Resp: 20    2. INTAKE/ OUTPUT:   I/O last 3 completed shifts:  In: 2672.19 [I.V.:2672.19]  Out: 1945 [Urine:1345; Emesis/NG output:600]    3. PHYSICAL EXAMINATION:   General: Sedated  Neuro: Moves all four extremities spontaneously, NAD  Resp: Intubated, Breathing non-labored on vent, scattered crackles bilaterally   CV: RRR  Abdomen: Soft, mildly distended, non tender to palpation   Groin site: c/d/i   Extremities: warm and well perfused    4. INVESTIGATIONS:   Arterial Blood Gases     Recent Labs  Lab 01/30/18  0624 01/30/18  0406 01/29/18  1840 01/29/18  1610   PH 7.35 7.29* 7.32* 7.14*   PCO2 53* 61* 55* 83*   PO2 91 83 79* 159*   HCO3 29* 30* 28 28     Complete Blood Count     Recent Labs  Lab  01/31/18  0408 01/30/18  2112 01/30/18  1607 01/30/18  1044 01/30/18  0402  01/29/18  0430 01/28/18  2148   WBC 12.9*  --   --   --  17.9*  --  23.0* 16.6*   HGB 6.8* 7.3* 7.2* 7.5* 6.5*  < > 8.0* 9.2*     --   --   --  221  --  284 286   < > = values in this interval not displayed.  Basic Metabolic Panel    Recent Labs  Lab 01/31/18  0408 01/30/18  0402 01/29/18  1411 01/29/18  0736 01/29/18  0430    140 139  --  139   POTASSIUM 4.3 4.9 5.0  5.0 5.4* 5.4*   CHLORIDE 107 107 106  --  105   CO2 27 28 27  --  28   BUN 24 28 25  --  19   CR 0.71 0.94 1.11  --  0.86   * 156* 142*  --  128*     Liver Function Tests    Recent Labs  Lab 01/30/18  1217 01/28/18  2148   AST  --  418*   ALT  --  121*   ALKPHOS  --  120   BILITOTAL  --  1.2   ALBUMIN  --  2.7*   INR 1.01 0.98     Coagulation Profile    Recent Labs  Lab 01/30/18  1217 01/28/18  2148   INR 1.01 0.98   PTT 32 28     5. RADIOLOGY:   Recent Results (from the past 24 hour(s))   XR Chest Port 1 View    Narrative    EXAM: XR CHEST PORT 1 VW  1/30/2018 12:14 PM      HISTORY: Picc placement     COMPARISON: Radiograph 1/29/2018    FINDINGS: AP radiograph of the chest. Right IJ central venous catheter  tip projects over the right atrium. Endotracheal tube projects 2.5 cm  above the nikki. Gastric tube passes below the diaphragm and is  coming off the field-of-view. Cardiac silhouette is stable. Unchanged  left basilar and lingular opacities. No pneumothorax. Worsening  perihilar opacities.      Impression    IMPRESSION:   1. Right upper extremity PICC tip projects over the right atrium.  2. Worsening perihilar opacities, which may indicate infection or  pulmonary edema.   3. Stable left basilar and lingular opacities, which may represent  atelectasis or infection.    I have personally reviewed the examination and initial interpretation  and I agree with the findings.    MEJIA MISTRY MD       =========================================      Patient  seen, findings and plan discussed with surgical ICU staff Chitra.    Jaya Lanier MD  General Surgery, PGY-2  [phone]

## 2018-01-31 NOTE — PLAN OF CARE
Problem: Patient Care Overview  Goal: Plan of Care/Patient Progress Review    OT/4AB:  Cancel - OT orders received and acknowledged.  Per discussion with RN, pt vent weaning and with tentative plans to extubate this PM.  Will allow pt to extubate and reschedule OT eval for 2/1 and initiate as appropriate.

## 2018-02-01 NOTE — PROGRESS NOTES
Bridle Placement:   Reason for bridle placement: Secure FT   Medicine delivered during procedure: lidocaine gel   Procedure: Successful   Location of top of clip on FT: @ 96 cm marker   Condition of nose/skin at time of bridle placement: Unremarkable   Face to Face time with patient: 5 minutes.    María Elena Jensen ,RD, LD, CNSC (4A SICU pgr [phone])

## 2018-02-01 NOTE — PROGRESS NOTES
Surgery Progress Note     Subjective:  - No acute events overnight. Tolerating PS this AM.    Objective:  Temp:  [98.3  F (36.8  C)-100.9  F (38.3  C)] 99.7  F (37.6  C)  Heart Rate:  [] 80  Resp:  [20-32] 20  BP: ()/(57-92) 111/66  FiO2 (%):  [30 %-40 %] 40 %  SpO2:  [90 %-98 %] 93 %  I/O last 3 completed shifts:  In: 2648.09 [I.V.:2348.09]  Out: 4350 [Urine:3925; Emesis/NG output:425]    Gen: Intubated, awake  Resp: intubated on PS  Abd: Soft, tense, distended  Ext: WWP    BMP    Recent Labs  Lab 01/31/18 2014 01/31/18  0408 01/30/18  0402 01/29/18  1411    141 140 139   POTASSIUM 3.9 4.3 4.9 5.0  5.0   CHLORIDE 102 107 107 106   JACKELIN 7.8* 7.6* 7.7* 7.6*   CO2 31 27 28 27   BUN 20 24 28 25   CR 0.77 0.71 0.94 1.11   GLC 96 126* 156* 142*     CBC  Recent Labs  Lab 02/01/18  0440 01/31/18  2120 01/31/18  1708 01/31/18  1107 01/31/18  0408  01/30/18  0402  01/29/18  0430   WBC 16.5*  --   --   --  12.9*  --  17.9*  --  23.0*   RBC 2.85*  --   --   --  2.30*  --  2.14*  --  2.57*   HGB 8.4* 8.5* 8.2* 8.5* 6.8*  < > 6.5*  < > 8.0*   HCT 26.6*  --   --   --  21.6*  --  20.8*  --  26.4*   MCV 93  --   --   --  94  --  97  --  103*   MCH 29.5  --   --   --  29.6  --  30.4  --  31.1   MCHC 31.6  --   --   --  31.5  --  31.3*  --  30.3*   RDW 16.2*  --   --   --  17.0*  --  16.1*  --  13.8     --   --   --  231  --  221  --  284   < > = values in this interval not displayed.  INR    Recent Labs  Lab 01/30/18  1217 01/28/18 2148   INR 1.01 0.98      AST/ALT & Alk Phos    Recent Labs  Lab 01/28/18 2148   *   *   ALKPHOS 120     Bili  Recent Labs   Lab Test  01/28/18 2148   BILITOTAL  1.2     Lipase/AmlyaseNo lab results found in last 7 days.    A/P: Naseem Ferrer is a 62 year old male with COPD and a newly diagnosed liver mass. The etiology of the mass is unclear. S/P IR visceral embolization (1/29)    No role for surgical intervention or management at this time. Once extubated  and OK to move to floor, would recommend medicine workup for non-surgical hepatic mass.    - Agree with continued Hgb checks, and transfuse if Hgb<7  - Vent cares per ICU team  - No anti-coagulation at this time  - Nutrition per SICU, okay for enteral feeding from surgery  - Surg Onc consulted    D/w Dr. Munoz, chief resident, and Dr. Carter, staff.    Reji Ladd MD  PGY-1 Surgery  pager [phone]

## 2018-02-01 NOTE — PLAN OF CARE
Problem: Patient Care Overview  Goal: Plan of Care/Patient Progress Review  Outcome: Improving  Patient extubated this AM and placed on BIPAP, patient bray was removed, and NJ was placed for feeding.  Up to BS commode for BM/Urine. One large SM with watery stool to follow on two other times.  Off BIPAP for walk with therapy-oxy mask and tolerated well.  Patient had a brief time with confusion and agitation after extubation but it has cleared with time.  Wife is at the bedside and MD to clarify surgical procedure and plan as well as Pregnosis with her as she is very worried.  Monitor for confusion, ETOH withdrawal?, and fevers as his WBC are elevated.  No pain at this time.  Patient has BA on for safety as he is very impulsive.     Mariana Wolfe RN BSN

## 2018-02-01 NOTE — PROGRESS NOTES
Small Bowel Feeding Tube Placement Assessment  Reason for Feeding Tube Placement: Anticipate loss of OGT with extubation and will need Bipap post extubation/need enteral access for meds and nutrition  Cortrak Start Time:09:20   Cortrak End Time: 09:51  Medicine Delivered During Procedure:Lidocaine to R nare   Placement Successful:  Presume post-pyloric (pending AXR confirmation).     Procedure Complications: None.  Noted pt required chin tuck to facilitate adv of FT into esophagus.  Final Placement Armin at exit of nare 95 cm  Face to Face time with patient: 45 minutes    María Elena Jensen, RD, LD, CNSC (4A SICU pgr [phone])

## 2018-02-01 NOTE — PROGRESS NOTES
"Surgical Oncology Progress Note    No acute issues overnight. Hgb stable and vitals within normal limits. No concerns for ongoing bleeding at this time.     /65  Pulse 67  Temp 99.7  F (37.6  C) (Axillary)  Resp 20  Ht 1.638 m (5' 4.5\")  Wt 70.9 kg (156 lb 4.9 oz)  SpO2 94%  BMI 26.42 kg/m2  General: NAD.  Respiratory: intubated   Abdomen: Abdomen is soft, distended.      A/P: Naseem Ferrer is a 62 year old male with COPD and a newly diagnosed liver mass that is suspicious for HCC with intraperitoneal hemorrhage s/p IR embolization 1/29.   -There is no role for hepatectomy in the current setting. Will eventually need a biopsy of the mass.     Discussed with staff    Abrahan Jimenez MD  General Surgery PGY-3    "

## 2018-02-01 NOTE — PLAN OF CARE
Problem: Restraint for Non-Violent/Non-Self-Destructive Behavior  Goal: Prevent/Manage Potential Problems  Maintain safety of patient and others during period of restraint.  Promote psychological and physical wellbeing.  Prevent injury to skin and involved body parts.  Promote nutrition, hydration, and elimination.   Outcome: Completed Date Met: 02/01/18  Restraints have been discontinued as the patient has been extubated and is alert and cooperatives

## 2018-02-01 NOTE — PROGRESS NOTES
"SPIRITUAL HEALTH SERVICES  SPIRITUAL ASSESSMENT Progress Note (Palliative Focus)  [facility] ([place]) 4A    REFERRAL SOURCE: Palliative consult service follow up visit.    Visit with patient Naseem Ferrer's wife Miya outside patient's room. Miya was very hopeful today, requested Jew prayer, and said that she felt that God was supporting them in \"giving Sondra [daughter] more time with her dad.\" Miya said Sondra is sensitive to Saqib's condition and \"worries about him\". Miya said she is supporting both Saqib and Sondra emotionally.    Plan: I will follow for spiritual support.    Leni John  Palliative   Pager [phone]  [facility] Inpatient Team Consult pager [phone] (M-F 8-4:30)  After-hours Answering Service [phone]   "

## 2018-02-01 NOTE — PROGRESS NOTES
"Nutrition Progress Note - Discussion of POC on 1 rounds; f/u for progress towards previous nutrition POC (see previous 1/31 assessment for details)    -Diet & Resp: extubated today but put on Bipap and anticipate ongoing NPO status.  Note pt requested a \"Budwiser\" shortly after extubation (currently has Folate + Thiamine scheduled)    -Enteral access: placed Cortrak FT prior to extubation (removed OGT) and awaiting AXR confirmation but suspect in small bowel (MDs requested small bowel intubation given increased risk for aspiration of EN via stomach while on Bipap).    -Weight Hx & Nutrition Hx: per wife's report, pt's oral intakes only had significantly declined <50% of usual intakes x3 days PTA (therefore, now with NPO status this adm pt has cumulatively had <50% of usual intakes x 7 days).  Pt denied wt loss PTA (reported a UBW of ~145# which would be approx equiv to adm/dosing wt of 147# or 66.7 kg) but wife believes he has lost weight given how his clothes fit but she is uncertain of amount.  Per further review of Care Everywhere records, noted weight of 64 kg (2/16/16), 68.5 kg (2/9/17) and 66.7 kg (11/14/17) so suspect that wt has not significantly declined since 11/2017, stable x 3 months?).    -FEN: K+ 4.1, Phos 2.9 - remains on IV KPhos replacement.    -GI: no BM documented yet this adm but has scheduled Miralax + Senna-Docusate ordered as well as PRN Dulcolax.  RN reported 275 ml out OGT so far this AM with 425 ml out yesterday but hopeful will tolerate post-pyloric TF and no without gastric decompression.    MALNUTRITION  % Intake: </= 50% for >/= 5 days (severe)  % Weight Loss: Weight loss does not meet criteria or None noted  Subcutaneous Fat Loss: Facial region and Thoracic/intercostal:  Mild  Muscle Loss: Temporal, Facial & jaw region, Thoracic region (clavicle, acromium bone, deltoid, trapezius, pectoral), Upper arm (bicep, tricep) and Upper leg (quadricep, hamstring):  Mild  Fluid " Accumulation/Edema: None noted  Malnutrition Diagnosis: (confrimed previous eval) Non-severe malnutrition in the context of acute illness (and possibly social/environmental circumstances pending severity of EtOH abuse)    Interventions:  Collaboration and Referral of Nutrition care - Discussed plan for FEN/GI/Resp on rounds with Providers who requested order start/adv of TF to goal over the next 24 hrs.  Ordered the followin.  TwoCal HN @ 10 ml/hr and adv by 10 ml q 4 hrs to initial goal @ 40 mL/hr (960 mL/day) to provide 1920 kcals (29+ kcal/kg/day), 81 g PRO (1.2+ g/kg/day), 672 mL H2O, 210 g CHO and 5 g Fiber daily. Additionally, ordered Prosource 1 pkt TID (3 pkts/day = 120 kcals, 33 g PRO)    2.  Certavite 15 ml/day in addition to Folate/Thiamine.    3.  Ordered to recheck K+/Mg++/Phos today @ 16:00 (after TFs started/advancing) to evaluate for sx of refeeding and need to repeat IV replacement and/or consider enteral K+ and Phos supplementation (NeutraPhos 1 pkt TID-QID) in addition to IV replacement.     María Elena Jensen, RD, LD, CNSC (4A SICU pgr [phone])

## 2018-02-01 NOTE — PLAN OF CARE
Problem: Patient Care Overview  Goal: Plan of Care/Patient Progress Review  OT 4AB: Cancel.  Pt getting extubated in AM and then working with PT in the PM.

## 2018-02-01 NOTE — PROGRESS NOTES
SURGICAL ICU PROGRESS NOTE  February 1, 2018      CO-MORBIDITIES:   No diagnosis found.    ASSESSMENT: Naseem Ferrer is a 62 year old man with PMH of COPD presented to an outside hospital with worsening abdominal pain over the past 2-3 weeks. Found to have a large liver lesion on CT, with hemoglobin of 9 (from baseline reported 16), concern for lesion rupture and bleeding (outside hospital CT with contrast did not demonstrate active extravasation). Patient had Hgb drop with worsening respiratory status requiring intubation and urgent IR embolization 01/29. Currently Hgb is stabilizing and working on extubation.     CHANGES FOR TODAY:  - PST today, extubate this AM to BiPAP, eventually alternate with HFNC tomorrow  - Place NJ tube and advance TF to goal  - Remove bray after extubation  - One time Lasix 60mg IV    PLAN:  Neuro/ pain/ sedation:  Encephalopathy 2nd to mixed metabolic, and drug reaction with possible withdrawal  Alcohol abuse with current use  - Dilaudid and oxycodone prn for pain  - fentanyl gtt   - Continue Seroquel 25 mg BID. Plan to discontinue it if patient shows no further signs of agitation/delirium.   - IV thiamine and folate    Pulmonary care:   Acute hypercapnic respiratory failure  Hx COPD  - AC 20/450 + 5, PST failed yesterday due to increased work of breathing. Will repeat PST today  - ABG to be obtained after starting PST  - Wean FiO2/peep  - Lower threshold for SaO2  Keep  >88% okay given history of COPD   - Home inhalers and prn nebs       Cardiovascular:    No acute issues  - Monitor hemodynamic status. Currently blood pressure within normal limits.  - Labetalol PRN HTN/tach     GI/nutrition:   Malnutrition anisa/pro deficit  Liver mass  - Place NJT and start TF today  - Zofran for nausea   - Workup for liver lesion, per surg/onc service         Renal/ Fluid Balance/Electrolytes:    Hyperkalemia  - Will monitor intake and output.  - Bray in place for strict I&Os. Plan to remove once  extubated and awake       Endocrine:    No active issues  - No history of diabetes        ID/ Antibiotics:  - No indication for antibiotics at this time        Heme:     Acute blood loss anemia  - No signs of bleeding  - Monitor hemoglobin, coagulation parameters, platelets  Received 4 RBC, 2 FFP total since admission.    - If further evidence of bleeding would consider IR consult for embolization                                                                                                                     Prophylaxis:    - Mechanical prophylaxis for DVT.   - No chemical DVT prophylaxis due to high risk of bleeding.  - GI ppx while NPO       Lines/ tubes/ drains:  - PICC placed 1/30. Re-wired 1/31 due to leak.        Disposition:  - Surgical ICU.     ====================================    SUBJECTIVE:   - No acute events overnight. Still intubated. On pressure support this morning off the fenantyl gtt.     OBJECTIVE:   1. VITAL SIGNS:   Temp:  [98.3  F (36.8  C)-100.6  F (38.1  C)] 99.7  F (37.6  C)  Heart Rate:  [] 83  Resp:  [20-32] 20  BP: (107-152)/(57-92) 118/65  FiO2 (%):  [30 %-40 %] 40 %  SpO2:  [90 %-97 %] 94 %  Ventilation Mode: CPAP/PS  FiO2 (%): 40 %  Rate Set (breaths/minute): 20 breaths/min  Tidal Volume Set (mL): 450 mL  PEEP (cm H2O): 5 cmH2O  Pressure Support (cm H2O): 5 cmH2O  Oxygen Concentration (%): 40 %  Resp: 20    2. INTAKE/ OUTPUT:   I/O last 3 completed shifts:  In: 1870.09 [I.V.:1570.09]  Out: 4250 [Urine:3950; Emesis/NG output:300]    3. PHYSICAL EXAMINATION:   General: Awake, NAD  Neuro: Moves all four extremities spontaneously, follow command  Resp: Intubated, Breathing non-labored on vent, scattered crackles bilaterally   CV: RRR  Abdomen: Soft, mildly distended, non tender to palpation   Groin site: c/d/i   Extremities: warm and well perfused    4. INVESTIGATIONS:   Arterial Blood Gases     Recent Labs  Lab 01/31/18  0920 01/30/18  0624 01/30/18  0406 01/29/18  1840   PH  7.42 7.35 7.29* 7.32*   PCO2 44 53* 61* 55*   PO2 54* 91 83 79*   HCO3 29* 29* 30* 28     Complete Blood Count     Recent Labs  Lab 02/01/18  0440 01/31/18 2120 01/31/18  1708 01/31/18  1107 01/31/18  0408  01/30/18  0402  01/29/18  0430   WBC 16.5*  --   --   --  12.9*  --  17.9*  --  23.0*   HGB 8.4* 8.5* 8.2* 8.5* 6.8*  < > 6.5*  < > 8.0*     --   --   --  231  --  221  --  284   < > = values in this interval not displayed.  Basic Metabolic Panel    Recent Labs  Lab 02/01/18 0440 01/31/18 2014 01/31/18  0408 01/30/18  0402    139 141 140   POTASSIUM 4.1 3.9 4.3 4.9   CHLORIDE 104 102 107 107   CO2 30 31 27 28   BUN 18 20 24 28   CR 0.68 0.77 0.71 0.94   * 96 126* 156*     Liver Function Tests    Recent Labs  Lab 01/30/18  1217 01/28/18  2148   AST  --  418*   ALT  --  121*   ALKPHOS  --  120   BILITOTAL  --  1.2   ALBUMIN  --  2.7*   INR 1.01 0.98     Coagulation Profile    Recent Labs  Lab 01/30/18  1217 01/28/18  2148   INR 1.01 0.98   PTT 32 28       5. RADIOLOGY:   Recent Results (from the past 24 hour(s))   XR Chest Port 1 View    Narrative    XR CHEST PORT 1 VW  1/31/2018 7:49 PM      HISTORY: RN placed PICC - verify tip placement;     COMPARISON: 1/30/2018    FINDINGS: Right-sided PICC with tip in the lower SVC. Gastric tube and  sidehole over the stomach. Endotracheal tube is 3.2 cm above the  nikki. Cardiac silhouette is stable and normal. Pulmonary vasculature  is mildly indistinct. Marked improvement in left pleural effusion and  associated opacities. Improving right lung opacities. No pneumothorax.      Impression    IMPRESSION: Right-sided PICC with tip in the lower SVC. Improving  bilateral opacities and left pleural effusion with atelectasis.    I have personally reviewed the examination and initial interpretation  and I agree with the findings.    ARIADNA (Reji) MD NEEMA   XR Abdomen Port 1 View    Narrative    XR ABDOMEN PORT F1 VW  1/31/2018 10:18 PM      HISTORY: Verify  small bowel feeding tube bedside placement;     COMPARISON: None    FINDINGS: Gastric tube with sidehole projecting over the body of the  stomach. Nonobstructive bowel gas pattern. Moderate stool burden.  Right central venous catheter tip projecting over the mid to low SVC.      Impression    IMPRESSION: Gastric tube sidehole projecting over the body of the  stomach.       =========================================      Patient seen, findings and plan discussed with surgical ICU staff Tgnanelli.    Jaya Lanier MD  General Surgery, PGY-2  [phone]

## 2018-02-01 NOTE — PLAN OF CARE
Problem: Patient Care Overview  Goal: Plan of Care/Patient Progress Review  Outcome: No Change  Neuro- Intact. PERRL. Patient denies pain during shift. Fentanyl gtt at 75mcg/hr. MSSA assessment being complete q 4 hrs.  CV- HR 80's-90's with no ectopy. SBP goal <180, no PRNs needed.  Pulm- Patient intubated. SIMV setting overnight, plan to PS this AM.  Lung sounds coarse/ inspiratory wheezes, diminished in bases. Moderate amount of thin secretions being suctioned.  GI/- BS present. No BM during shift. Bowel meds given per orders, see MAR. Abdomen firm and rounded, pt denies pain. OG to LIS, brown/bile output. Hogue in place. Good urine output.  Skin- right groin site unchanged, CMS intact.  Lines- TL PICC rewired in right arm, placement verified and OK'd to use. 3 PIV's flushed and saline locked.  No replacement of electrolytes needed this AM. Hgb stable.  Bilateral soft wrist restraints in place d/t pt attempting to grab at tube during repositioning. See flowsheets for further info.  Continue to monitor pt and notify MD of changes

## 2018-02-02 NOTE — PROGRESS NOTES
Surgery Progress Note     Subjective:  - Extubated yesterday and doing well off BiPAP.     Objective:  Temp:  [96.8  F (36  C)-99.7  F (37.6  C)] 98.6  F (37  C)  Heart Rate:  [] 96  Resp:  [18-47] 26  BP: (122-165)/(63-85) 126/80  FiO2 (%):  [40 %] 40 %  SpO2:  [90 %-98 %] 96 %  I/O last 3 completed shifts:  In: 622 [I.V.:72; NG/GT:90]  Out: 1625 [Urine:1600; Emesis/NG output:25]    Gen: awake, alert, pleasant  Resp: on oxy mask, wet sounds  Abd: Soft, distended, non-tender  Ext: WWP    BMP    Recent Labs  Lab 02/02/18  0401 02/01/18  1325 02/01/18  0440 01/31/18 2014   * 144 141 139   POTASSIUM 3.9 4.2 4.1 3.9   CHLORIDE 108 106 104 102   JACKELIN 8.0* 7.9* 7.7* 7.8*   CO2 31 30 30 31   BUN 21 18 18 20   CR 0.54* 0.67 0.68 0.77   * 95 100* 96     CBC  Recent Labs  Lab 02/02/18  0401 02/01/18  0440 01/31/18  2120 01/31/18  1708  01/31/18  0408  01/30/18  0402   WBC 14.0* 16.5*  --   --   --  12.9*  --  17.9*   RBC 2.89* 2.85*  --   --   --  2.30*  --  2.14*   HGB 8.5* 8.4* 8.5* 8.2*  < > 6.8*  < > 6.5*   HCT 27.7* 26.6*  --   --   --  21.6*  --  20.8*   MCV 96 93  --   --   --  94  --  97   MCH 29.4 29.5  --   --   --  29.6  --  30.4   MCHC 30.7* 31.6  --   --   --  31.5  --  31.3*   RDW 15.9* 16.2*  --   --   --  17.0*  --  16.1*    253  --   --   --  231  --  221   < > = values in this interval not displayed.  INR    Recent Labs  Lab 01/30/18  1217 01/28/18 2148   INR 1.01 0.98      AST/ALT & Alk Phos    Recent Labs  Lab 01/28/18 2148   *   *   ALKPHOS 120     Bili  Recent Labs   Lab Test  01/28/18 2148   BILITOTAL  1.2     Lipase/AmlyaseNo lab results found in last 7 days.    A/P: Naseem Ferrer is a 62 year old male with COPD and a newly diagnosed liver mass. The etiology of the mass is unclear. S/P IR visceral embolization (1/29)    No role for surgical intervention or management at this time. Once extubated and OK to move to floor, medicine will assume primary for  COPD and suspected chem dep. Surgical oncology will continue to follow, but no indication for general surgery intervention. General Surgery team will sign off today when medicine team assumes primary.      D/w Dr. Munoz, chief resident, and Dr. Martins, staff.    Reji Ladd MD  PGY-1 Surgery  pager [phone]

## 2018-02-02 NOTE — PROGRESS NOTES
SURGICAL ICU PROGRESS NOTE  February 2, 2018      CO-MORBIDITIES:   No diagnosis found.    ASSESSMENT: Naseem Ferrer is a 62 year old man with PMH of COPD presented to an outside hospital with worsening abdominal pain over the past 2-3 weeks. Found to have a large liver lesion on CT, with hemoglobin of 9 (from baseline reported 16), concern for lesion rupture and bleeding (outside hospital CT with contrast did not demonstrate active extravasation). Patient had Hgb drop with worsening respiratory status requiring intubation and urgent IR embolization 01/29. Currently Hgb is stabilizing and extubated    CHANGES FOR TODAY:  - discontinue haldol PRN  - HFNC  - bedside swallow, advance diet, start calorie counts  - continue tube feeds  - start Heparin prophylaxis    PLAN:  Neuro/ pain/ sedation:  Encephalopathy 2nd to mixed metabolic, and drug reaction with possible withdrawal  Alcohol abuse with current use  - oxycodone prn for pain  - Continue Seroquel 25 mg BID. Plan to discontinue it if patient shows no further signs of agitation/delirium.   - Received IV thiamine and folate    Pulmonary care:   Acute hypercapnic respiratory failure  Hx COPD  - Extubated 2/1  - HFNC to keep sats >88%  - Lower threshold for SaO2  Keep  >88% okay given history of COPD   - Aggressive pulm toilet with IS and acapella   - Home inhalers and prn nebs       Cardiovascular:    No acute issues  - Monitor hemodynamic status. Currently blood pressure within normal limits.     GI/nutrition:   Malnutrition anisa/pro deficit  Liver mass  - Continue TF @ 40 ml/hr  - Zofran for nausea   - Workup for liver lesion, per surg/onc service  - bedside swallow test, advance diet as tolerated  - calorie counts         Renal/ Fluid Balance/Electrolytes:    Hyperkalemia  - Will monitor intake and output.  - Hogue in place for strict I&Os. Plan to remove once extubated and awake  - IV lasix 60mg x1       Endocrine:    No active issues  - No history of  diabetes        ID/ Antibiotics:  - No indication for antibiotics at this time. WBC 16.5>>14.0       Heme:     Acute blood loss anemia  - No signs of bleeding  - Monitor hemoglobin, coagulation parameters, platelets  Received 4 RBC, 2 FFP total since admission.         Prophylaxis:    - Mechanical prophylaxis for DVT.   - start Heparin 5000 units q8h       Lines/ tubes/ drains:  - PICC placed 1/30. Re-wired 1/31 due to leak.        Disposition:  - Surgical ICU.     ====================================    SUBJECTIVE:   - No acute events overnight. Breathing comfortably on 5L oxy plus. Denies abdominal pain. Had multiple loose BMs      OBJECTIVE:   1. VITAL SIGNS:   Temp:  [96.8  F (36  C)-99.7  F (37.6  C)] 98.8  F (37.1  C)  Heart Rate:  [] 85  Resp:  [18-47] 26  BP: (122-149)/(67-85) 130/75  FiO2 (%):  [50 %] 50 %  SpO2:  [90 %-97 %] 95 %  Ventilation Mode: CPAP/PS  FiO2 (%): 50 %  Rate Set (breaths/minute): 20 breaths/min  Tidal Volume Set (mL): 450 mL  PEEP (cm H2O): 5 cmH2O  Pressure Support (cm H2O): 5 cmH2O  Oxygen Concentration (%): 40 %  Resp: 26    2. INTAKE/ OUTPUT:   I/O last 3 completed shifts:  In: 622 [I.V.:72; NG/GT:90]  Out: 1625 [Urine:1600; Emesis/NG output:25]    3. PHYSICAL EXAMINATION:   General: Awake, alert, NAD  Resp: Breathing non-labored on 5L oxy plus.   CV: RRR  Abdomen: Soft, mildly-distended, Non-tender  Extremities: warm and well perfused    4. INVESTIGATIONS:   Arterial Blood Gases     Recent Labs  Lab 01/31/18  0920 01/30/18  0624 01/30/18  0406 01/29/18  1840   PH 7.42 7.35 7.29* 7.32*   PCO2 44 53* 61* 55*   PO2 54* 91 83 79*   HCO3 29* 29* 30* 28     Complete Blood Count     Recent Labs  Lab 02/02/18  0401 02/01/18  0440 01/31/18  2120 01/31/18  1708  01/31/18  0408  01/30/18  0402   WBC 14.0* 16.5*  --   --   --  12.9*  --  17.9*   HGB 8.5* 8.4* 8.5* 8.2*  < > 6.8*  < > 6.5*    253  --   --   --  231  --  221   < > = values in this interval not displayed.  Basic  Metabolic Panel    Recent Labs  Lab 02/02/18  0401 02/01/18  1325 02/01/18  0440 01/31/18 2014   * 144 141 139   POTASSIUM 3.9 4.2 4.1 3.9   CHLORIDE 108 106 104 102   CO2 31 30 30 31   BUN 21 18 18 20   CR 0.54* 0.67 0.68 0.77   * 95 100* 96     Liver Function Tests    Recent Labs  Lab 01/30/18  1217 01/28/18  2148   AST  --  418*   ALT  --  121*   ALKPHOS  --  120   BILITOTAL  --  1.2   ALBUMIN  --  2.7*   INR 1.01 0.98     Coagulation Profile    Recent Labs  Lab 01/30/18  1217 01/28/18  2148   INR 1.01 0.98   PTT 32 28       5. RADIOLOGY:   Recent Results (from the past 24 hour(s))   XR Abdomen Port 1 View    Narrative    Examination:  XR ABDOMEN PORT F1 VW 2/1/2018 12:39 PM     Comparison: Abdominal x-ray 1/31/2018    History: Verify small bowel feeding tube bedside placement;     Findings: Portable radiograph of the abdomen. Interval removal of the  gastric tube. Feeding tube tip projecting over the proximal jejunum.  Right central venous catheter tip overlying the low SVC, stable in  position. No dilated loops of bowel in the upper abdomen. Streaky  linear density in the left lung base.      Impression    Impression:   1. Feeding tube tip projecting over the proximal jejunum.  2. Left basilar atelectasis, infection or aspiration.    I have personally reviewed the examination and initial interpretation  and I agree with the findings.    MARK ANTHONY ROSS MD       =========================================      Patient seen, findings and plan discussed with surgical ICU staff Tgnanelli.    Jaya Lanier MD  General Surgery, PGY-2  [phone]

## 2018-02-02 NOTE — PLAN OF CARE
Problem: Liver Biopsy (Adult,Pediatric)  Goal: Signs and Symptoms of Listed Potential Problems Will be Absent, Minimized or Managed (Liver Biopsy)  Signs and symptoms of listed potential problems will be absent, minimized or managed by discharge/transition of care (reference Liver Biopsy (Adult,Pediatric) CPG).  Outcome: Improving  D. Stable up out of bed - pain improving- no pain at this time hbg wnl-  bp wnl   A stable - cont to monitor-  I cont to monitor for bleeding.

## 2018-02-02 NOTE — PLAN OF CARE
"Problem: Patient Care Overview  Goal: Plan of Care/Patient Progress Review  Discharge Planner OT   Patient plan for discharge: hopeful to go home, but wife concerned as she wants pt to be strong and ready for next surgery  Current status: Pt impulsive and requiring many cues to wait for therapist to provide instruction prior to moving. Pt on 4L O2 via oxymask for treatment from 50% FiO2 via HFNC at rest. Pt ambulated into BR with SBA and writer managing lines. Pt stood to wash hair and demonstrated impulsivity -- pt moving quickly and breathing heavily. O2 down to 88% on 4L O2. Cued for PLB. Pt also cued to alternate between sitting and standing for tasks. Pt required min A to wash hair. Pt left water in the sink on, close to overflowing, and required therapist intervention to turn it off. Pt brushed teeth with SBA while standing and required frequent cues to slow down breathing. Little insight into SOB -- states he was breathing hard 2/2 being \"mad\" about the lines and not due to air hunger.  Barriers to return to prior living situation: Wife runs a  and will be able to assist with pt somewhat, but pt needs to have a high level of I with ADLs. Pt deconditioned and fatigued with functional activity, cognition is a concern -- will complete cog screen.   Recommendations for discharge: At this point, rec inpatient rehab due to significant deconditioning and fatigue with functional activity.   Rationale for recommendations: To address above deficits.        Entered by: Trina Rodas 02/02/2018 4:30 PM     OT 4A      "

## 2018-02-02 NOTE — PROGRESS NOTES
02/02/18 1523   Quick Adds   Type of Visit Initial Occupational Therapy Evaluation   Living Environment   Lives With child(ekaterina), dependent;spouse  (daughters 33 and 32 (1 lives in [place]))   Living Arrangements house   Number of Stairs to Enter Home 1  (from garage)   Number of Stairs Within Home 13   Living Environment Comment Pt is able to stay on the main level. Typically, pt stays downstairs. Pt has a tub shower on the main level, has a walk in shower in the basement but he does not like the walk in shower -- feels claustrophobic.    Self-Care   Usual Activity Tolerance good   Current Activity Tolerance fair   Regular Exercise no   Equipment Currently Used at Home none  (does not have AE, AD at home, wife states could borrow)   Activity/Exercise/Self-Care Comment Would like to get back to being able to do more outside activities.    Functional Level Prior   Ambulation 0-->independent   Transferring 0-->independent   Toileting 0-->independent   Bathing 0-->independent   Dressing 0-->independent   Eating 0-->independent   Communication 0-->understands/communicates without difficulty   Swallowing 0-->swallows foods/liquids without difficulty   Cognition 0 - no cognition issues reported   Fall history within last six months no   Which of the above functional risks had a recent onset or change? ambulation;transferring;toileting;bathing;dressing;cognition   Prior Functional Level Comment Pt was I with ADLs PTA   General Information   Onset of Illness/Injury or Date of Surgery - Date 01/28/18   Referring Physician Colin Miller DO   Patient/Family Goals Statement Wife wants pt to get strong for upcoming surgery   Additional Occupational Profile Info/Pertinent History of Current Problem Naseem Ferrer is a 62 year old male with a hx of COPD who was admitted to SICU 01/28/2018 from OSH with worsening abdominal pain, large liver lesion seen on CT, drop in hemoglobin with concern for lesions rupture and bleeding, with  subsequent worsening respiratory status requiring intubation and urgent IR embolization 01/29. Pt extubated 02/1 and transferred to medicine service 02/02/18 with stable Hgb.    Precautions/Limitations oxygen therapy device and L/min  (BiPAP 50% or oxiplus 4-6L O2)   General Observations Activity: ambulate 4x/day   General Info Comments family present for eval   Cognitive Status Examination   Orientation orientation to person, place and time   Level of Consciousness alert   Able to Follow Commands WNL/WFL   Personal Safety (Cognitive) moderate impairment;impulsive   Cognitive Comment Pt alert and oriented, however, moving quickly and impulsively and requiring many cues to wait for instruction prior to attempting to get OOB. Will monitor. Wife reports cognition appears to be clearing.    Visual Perception   Visual Perception Wears glasses   Sensory Examination   Sensory Comments denied n/t   Pain Assessment   Patient Currently in Pain No   Integumentary/Edema   Integumentary/Edema no deficits were identifed   Range of Motion (ROM)   ROM Quick Adds No deficits were identified   Strength   Strength Comments functional strength in UEs noted   Hand Strength   Hand Strength Comments fair    Transfer Skill: Sit to Stand   Level of [place]: Sit/Stand stand-by assist   Instrumental Activities of Daily Living (IADL)   Previous Responsibilities meal prep;housekeeping;laundry;shopping;yardwork;medication management;finances;driving;work;   IADL Comments Pt assisting with in home . Does laundry (on lower level) and kept busy throughout the day.   Activities of Daily Living Analysis   Impairments Contributing to Impaired Activities of Daily Living cognition impaired;strength decreased;pain   General Therapy Interventions   Planned Therapy Interventions ADL retraining;cognition;strengthening;transfer training;home program guidelines;progressive activity/exercise   Clinical Impression   Criteria for  "Skilled Therapeutic Interventions Met yes, treatment indicated   OT Diagnosis Decreased ADL I   Influenced by the following impairments cognition/impulsivity, O2 requirements, deconditioning   Assessment of Occupational Performance 3-5 Performance Deficits   Identified Performance Deficits home management, dressing, bathing, work   Clinical Decision Making (Complexity) Low complexity   Therapy Frequency (6x/week)   Predicted Duration of Therapy Intervention (days/wks) 1 week   Anticipated Discharge Disposition Home with Outpatient Therapy;Acute Rehabilitation Facility;Transitional Care Facility   Risks and Benefits of Treatment have been explained. Yes   Patient, Family & other staff in agreement with plan of care Yes   Clinical Impression Comments Pt to benefit from skilled OT to address above deficits. See daily flowsheet for details regarding tx provided.    [facility] AM-PAC  \"6 Clicks\" Daily Activity Inpatient Short Form   1. Putting on and taking off regular lower body clothing? 3 - A Little   2. Bathing (including washing, rinsing, drying)? 2 - A Lot   3. Toileting, which includes using toilet, bedpan or urinal? 3 - A Little   4. Putting on and taking off regular upper body clothing? 3 - A Little   5. Taking care of personal grooming such as brushing teeth? 3 - A Little   6. Eating meals? 1 - Total   Daily Activity Raw Score (Score out of 24.Lower scores equate to lower levels of function) 15   Total Evaluation Time   Total Evaluation Time (Minutes) 8     "

## 2018-02-02 NOTE — PLAN OF CARE
Problem: Patient Care Overview  Goal: Plan of Care/Patient Progress Review  PT / 4AB    Discharge Planner PT   Patient plan for discharge: home with assist and OP PT  Current status: Performing bed mobility and transfers + supervision secondary to line management.  Amb with quick mallory and no AD on 6L via oxymask, SpO2 > 96%. No overt LOB with dynamic balance challenges.   Barriers to return to prior living situation: Supplementary O2, medical complexity  Recommendations for discharge: home with assist, OP PT, +/- home O2  Rationale for recommendations: Demonstrates improvement with balance and safety during amb.  Anticipate pt will be safe to discharge home, would benefit from continued skilled PT to progress activity tolerance and return to PLOF.        Entered by: María Elena Wilburn 02/02/2018 1:07 PM

## 2018-02-02 NOTE — PROGRESS NOTES
[facility], [place]    Internal Medicine Progress Note - Gold Service      Assessment & Plan   Naseem Ferrer is a 62 year old male with a hx of COPD who was admitted to SICU 01/28/2018 from OSH with worsening abdominal pain, large liver lesion seen on CT, drop in hemoglobin with concern for lesions rupture and bleeding, with subsequent worsening respiratory status requiring intubation and urgent IR embolization 01/29. Pt extubated 02/1 and transferred to medicine service 02/02/18 with stable Hgb.     Problem List:    # Liver Mass  # Acute blood loss anemia  # Encephalopathy   # Alcohol abuse, acute withdrawal  # Acute hypercapnic respiratory failure  # Hx of COPD  # Leukocytosis        Diet: Adult Formula Drip Feeding: Continuous TwoCal HN; Nasoduodenal tube; Goal Rate: 40; mL/hr; Medication - Tube Feeding Flush Frequency: At least 15-30 mL water before and after medication administration and with tube clogging; Amout to Send (Nutriti...  Regular Diet Adult  Room Service  Fluids: D5 1/2 NaCl @ 10  DVT Prophylaxis: Heparin SQ  Code Status: Full Code    Disposition Plan   Expected discharge: 2 - 3 days, recommended to prior living arrangement once hemoglobin stable and plan for liver mass w/u established.     Entered: Lesli Monsivais 02/02/2018, 1:23 PM   Information in the above section will display in the discharge planner report.      The patient's care was discussed with the Attending Physician, Dr. Aparicio.    Lesli Monsivais  Internal Medicine Staff Hospitalist Service  [facility]  Pager: [phone]  Please see sticky note for cross cover information    Interval History   Abdominal pain improved. Breathing comfortably on 5L HFNC. Denies chest pain, shortness of breath or difficulty breathing. Bowels moving without difficulty. Tolerating small amounts of PO intake.    Data reviewed today: I reviewed all medications, new labs and imaging results over the last 24  hours.     Physical Exam   Vital Signs: Temp: 98.6  F (37  C) Temp src: Oral BP: 126/80   Heart Rate: 96 Resp: 26 SpO2: 96 % O2 Device: High Flow Nasal Cannula (HFNC) Oxygen Delivery: 5 LPM  Weight: 156 lbs 4.9 oz  General Appearance: Alert and oriented x 3, NAD  Respiratory: Lungs CTAB, no wheezing or crackles  Cardiovascular: RRR, no murmurs or gallops  GI: abdomen distended, tense, non tender to palpation  Skin: warm and dry to touch, no rashes or excoriations  Other: no peripheral edema          Data   Data     Recent Labs  Lab 02/02/18  0401 02/01/18  1325 02/01/18  0440 01/31/18  2120  01/31/18  0408  01/30/18  1217  01/28/18  2148   WBC 14.0*  --  16.5*  --   --  12.9*  --   --   < > 16.6*   HGB 8.5*  --  8.4* 8.5*  < > 6.8*  < >  --   < > 9.2*   MCV 96  --  93  --   --  94  --   --   < > 100     --  253  --   --  231  --   --   < > 286   INR  --   --   --   --   --   --   --  1.01  --  0.98   * 144 141  --   < > 141  --   --   < > 136   POTASSIUM 3.9 4.2 4.1  --   < > 4.3  --   --   < > 4.5   CHLORIDE 108 106 104  --   < > 107  --   --   < > 104   CO2 31 30 30  --   < > 27  --   --   < > 25   BUN 21 18 18  --   < > 24  --   --   < > 15   CR 0.54* 0.67 0.68  --   < > 0.71  --   --   < > 0.69   ANIONGAP 6 8 7  --   < > 7  --   --   < > 8   JACKELIN 8.0* 7.9* 7.7*  --   < > 7.6*  --   --   < > 7.6*   * 95 100*  --   < > 126*  --   --   < > 122*   ALBUMIN  --   --   --   --   --   --   --   --   --  2.7*   PROTTOTAL  --   --   --   --   --   --   --   --   --  6.5*   BILITOTAL  --   --   --   --   --   --   --   --   --  1.2   ALKPHOS  --   --   --   --   --   --   --   --   --  120   ALT  --   --   --   --   --   --   --   --   --  121*   AST  --   --   --   --   --   --   --   --   --  418*   < > = values in this interval not displayed.

## 2018-02-02 NOTE — PLAN OF CARE
Problem: Patient Care Overview  Goal: Plan of Care/Patient Progress Review  Outcome: Therapy, progress towards functional goals is fair  Neuro- A&O x4, PERRL, moves all extremities purposefully. Patient up out of bed with assist x1 to BSC. Steady on feet. Patient denies pain throughout shift. Bedside attendant in room until 2300 d/t impulsiveness to get out of bed. Patient redirectable this PM, cooperative with cares, instructed how to use call light, bed alarm on. No attempts to get out of bed during shift.  CV- HR 80's with no ectopy. No concerns with BP.  Pulm- Pt extubated earlier today. On oxy plus mask at 5 LPM. O2 sat's 95%. Bi PAP at bedside if needed. Lung sounds coarse. Patient has strong cough, using yaunker to clear secretions.  GI/- BS normal. Pt had 1 small loose BM during night. Bowel meds held d/t multiple BM's today. Abdomen rounded/ distended and soft. Patient using BSC to void as well. Good UO. TF advanced to goal of 40 ml/hr with standard flush q 4.  Skin- No concerns with skin. Right groin site unchanged  Lines- TL PICC on right arm. 3 PIV's.  Hgb stable at 8.5 this AM, replacing phosphorus this AM  Continue to monitor and notify of changes. Plan to eventually biopsy mass for workup.

## 2018-02-02 NOTE — PROGRESS NOTES
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, able to have up to 14); Pt reporting he has a 2nd floor, unable to recall number of stairs or if needs to access it for bedrooms/bathrooms, deferred further questions to wife but she was not present at time of evaluation to confirm/deny   Self-Care   Usual Activity Tolerance good   Current Activity Tolerance fair   Regular Exercise no   Equipment Currently Used at Home none   Activity/Exercise/Self-Care Comment Enjoys fishing in the summer and eating   Functional Level Prior   Ambulation 0-->independent   Transferring 0-->independent   Toileting 0-->independent   Bathing 0-->independent   Dressing 0-->independent   Eating 0-->independent   Communication 0-->understands/communicates without difficulty   Swallowing 0-->swallows foods/liquids without difficulty   Cognition 0 - no cognition issues reported   Fall history within last six months no   Which of the above functional risks had a recent onset or change? ambulation;transferring;toileting;bathing;dressing;cognition   Prior Functional Level Comment Pt reports he was previously IND with ADLs and IADLs   General Information   Onset of Illness/Injury or Date of Surgery - Date 01/28/18   Referring Physician Colin Miller DO   Patient/Family Goals Statement None specifically stated   Pertinent History of Current Problem (include  personal factors and/or comorbidities that impact the POC) 62 year old man with PMH of COPD presented to an outside hospital with worsening abdominal pain over the past 2-3 weeks. Found to have a large liver lesion on CT, with hemoglobin of 9 (from baseline reported 16), concern for lesion rupture and bleeding (outside hospital CT with contrast did not demonstrate active extravasation). Patient had Hgb drop with worsening respiratory status requiring intubation and urgent IR embolization 01/29.  Now extubated and on BiPAP at 50% FiO2.   Precautions/Limitations fall precautions;oxygen therapy device and L/min  (BiPAP 50% or oxiplus 4-6L O2)   Heart Disease Risk Factors Smoking;Overweight;Gender;Age  (0.25 - 1 PPD x 50 years smoking history)   General Info Comments Verbal okay by resident to trial walk off BiPAP   Cognitive Status Examination   Orientation person;place  (oriented to month/year but not day(7th vs 1st of Feb))   Level of Consciousness alert;confused  (impulsive)   Follows Commands and Answers Questions 75% of the time   Personal Safety and Judgment at risk behaviors demonstrated   Memory (appears impaired with questions regarding living situation)   Posture    Posture Forward head position;Protracted shoulders   Range of Motion (ROM)   ROM Comment BUE and BLE grossly WFL   Strength   Strength Comments Not formally assessed but demonstrates at least 3/5 grossly in BUE and BLE   Bed Mobility   Bed Mobility Comments Supine <> Sit and rolling with SBA   Transfer Skills   Transfer Comments Sit <> stand with CGA   Gait   Gait Comments Ambulation 10' with CGA-min A   Balance   Balance Comments Static standing balance with CGA, intermittent LOB with turning or distraction requiring min A to prevent LOB   General Therapy Interventions   Planned Therapy Interventions balance training;gait training;strengthening;transfer training;risk factor education;home program guidelines;progressive activity/exercise  "  Clinical Impression   Criteria for Skilled Therapeutic Intervention yes, treatment indicated   PT Diagnosis Impaired balance and safety   Influenced by the following impairments balance, impulsive, respiratory status, cognition   Functional limitations due to impairments gait, stairs, functional endurance, transfers   Clinical Presentation Evolving/Changing   Clinical Presentation Rationale impaired respiratory status, cognition/impulsive, fatigue   Clinical Decision Making (Complexity) Low complexity   Therapy Frequency` 5 times/week   Predicted Duration of Therapy Intervention (days/wks) 2 weeks   Anticipated Discharge Disposition Home with Assist;Home with Outpatient Therapy   Risk & Benefits of therapy have been explained Yes   Patient, Family & other staff in agreement with plan of care Yes   [facility] TM \"6 Clicks\"   2016, Trustees of [facility], under license to [employer].  All rights reserved.   6 Clicks Short Forms Basic Mobility Inpatient Short Form   [facility]  \"6 Clicks\" V.2 Basic Mobility Inpatient Short Form   1. Turning from your back to your side while in a flat bed without using bedrails? 4 - None   2. Moving from lying on your back to sitting on the side of a flat bed without using bedrails? 4 - None   3. Moving to and from a bed to a chair (including a wheelchair)? 3 - A Little   4. Standing up from a chair using your arms (e.g., wheelchair, or bedside chair)? 4 - None   5. To walk in hospital room? 3 - A Little   6. Climbing 3-5 steps with a railing? 3 - A Little   Basic Mobility Raw Score (Score out of 24.Lower scores equate to lower levels of function) 21   Total Evaluation Time   Total Evaluation Time (Minutes) 9     "

## 2018-02-02 NOTE — PROGRESS NOTES
"Nutrition Progress Note - Discussion of POC on SICU rounds; f/u for progress towards previous nutrition POC (see previous 2/1 reassessment for details)    -Resp: Extubated yesterday and currently on high flow NC.  -Diet: advanced to Regular diet today - pt reports tried to eat meat loaf for lunch but was \"horrible\" so didn't eat a lot of it but feels like appetite is improving.  Willing to trial shakes.  -EN/enteral access: Two Jonathan HN @ goal 40 ml/hr + Prosource 1 pkt TID    Interventions:  1.  Nutrition Education and Goals: Encourged pt to try to eat % of meals TID versus 50-75% of meals + 1-2 supplements/day to help improve oral intakes to allow for FT to be removed/discontinuation of TFs.        2.  Collaboration and Referral of Nutrition care - Discussed plan for FEN/GI/Resp on rounds with Providers and recommend keep FT/TF therapy until see stabilization in resp status and pt able to demonstrate above intake goals (or if/when Calorie Count data available, goal to consume 2/3 higher end of est needs ~1350 kcals/68 g PRO) before remove FT and d/c TFs.  Ordered Ensure Plus Shake (Strawberry) BID between meals (each supplement provides 480 kcals, 15 gms PRO and 66 gms CHO).    María Elena Jensen ,RD, LD, [facility] (4A SICU pgr [phone])   "

## 2018-02-03 NOTE — PROGRESS NOTES
SURGICAL ONCOLOGY PROGRESS NOTE     Subjective:   -doing well this morning; on high flow O2 but otherwise doing well; no complaints    Exam:   B/P: 127/82, T: 98.1, P: 67, R: 43  Non-labored breathing on high flow O2  Abdomen soft    I/O:    Intake/Output Summary (Last 24 hours) at 02/03/18 0842  Last data filed at 02/03/18 0700   Gross per 24 hour   Intake              930 ml   Output             2325 ml   Net            -1395 ml     UOP:  CT:   Laboratory Studies:   Latest CBC   Lab Results   Component Value Date    WBC 13.0 02/03/2018     Lab Results   Component Value Date    RBC 2.99 02/03/2018     Lab Results   Component Value Date    HGB 8.9 02/03/2018     Lab Results   Component Value Date    HCT 29.4 02/03/2018     No components found for: MCT  Lab Results   Component Value Date    MCV 98 02/03/2018     Lab Results   Component Value Date    MCH 29.8 02/03/2018     Lab Results   Component Value Date    MCHC 30.3 02/03/2018     Lab Results   Component Value Date    RDW 15.5 02/03/2018     Lab Results   Component Value Date     02/03/2018     Latest Basic Metabolic Panel   Last Basic Metabolic Panel:  Lab Results   Component Value Date     02/03/2018      Lab Results   Component Value Date    POTASSIUM 3.7 02/03/2018     Lab Results   Component Value Date    CHLORIDE 108 02/03/2018     Lab Results   Component Value Date    JACKELIN 8.0 02/03/2018     Lab Results   Component Value Date    CO2 30 02/03/2018     Lab Results   Component Value Date    BUN 24 02/03/2018     Lab Results   Component Value Date    CR 0.53 02/03/2018     Lab Results   Component Value Date     02/03/2018     Assessment:   61 y/o gentleman with right sided liver tumor with recent hemorrhage, s/p IR embolization.    Plan:   -agree with SICU management  -hemoglobin stable today (8.9) - has not required transfusion for over 48 hours  -discussed patient's progress and next steps with his family yesterday; we reiterated to them  that the goal of this admission was to control his acute bleeding episode and that further workup of this mass (i.e. Possible biopsy, PET scan, further imaging) would take place over the coming weeks AFTER he has had time to recover from this acute episode  -will continue to reiterate and discuss this with family as needed     Discussed with staff, Dr Connelly.    Favio Pineda MD  PGY-[id number]  February 3, 2018

## 2018-02-03 NOTE — PLAN OF CARE
Problem: Pain, Acute (Adult)  Goal: Identify Related Risk Factors and Signs and Symptoms  Related risk factors and signs and symptoms are identified upon initiation of Human Response Clinical Practice Guideline (CPG).   D. Awake alert - up out of bed with assist- denies shortness of breath even tho tachypnea noted- states breaths fast always. sats 90% on 3 liters mask  A transfer to [facility]   P stable  md in tovisit- tranfer to [facility] - report called

## 2018-02-03 NOTE — PLAN OF CARE
Problem: Patient Care Overview  Goal: Plan of Care/Patient Progress Review  Outcome: Improving  Pt with liver lesion. A+O x4. Neuros intact. Denies pain. Afebrile. BP stable. NSR on cardiac monitor. Last Hgb level 8.9.Tachypneic (RR 30-40). Experiences dyspnea on exertion. Sating mid 90's on 40% O2 via HFNC. Denies SOB or chest pain. Tolerating TF well @ 40 cc/hr. UOP is marginal. Plan: see flow sheet for detailed assessments and interventions, transfer to floor today, continue to support POC.

## 2018-02-03 NOTE — PROGRESS NOTES
[facility], [place]    Internal Medicine Progress Note - Gold Service      Assessment & Plan   Naseem Ferrer is a 62 year old male with a hx of COPD who was admitted to SICU 01/28/2018 from OSH with worsening abdominal pain, large liver lesion seen on CT, drop in hemoglobin with concern for lesions rupture and bleeding, with subsequent worsening respiratory status requiring intubation and urgent IR embolization 01/29. Pt extubated 02/01 and transferred to medicine service 02/02/18 with stable Hgb.     # Liver Mass  # Transaminitis  # Acute blood loss anemia  CT and MRI of abdomen 1/28 found to have large mass in R lobe of liver. Scheduled to undergo liver biopsy, but given worsening abdominal pain presented to OSH ED where he was found to be tachycardic w/ hgb of 9,  with concern for hemorrhage of mass, prompting transfer to [facility] 01/28/18. Hgb further dropped to 6.4, prompting emergent embolization 01/29. S/p 3 units RBC, 2 units FFP since admission. Surgical oncology evaluated pt without current plans for surgical intervention. Hgb stable since embolization. AFP 2.8. LFTs downtrending, but ALT/AST continue to be elevated in 2:1 ratio suggestive of alcohol abuse.   - will need biopsy of liver mass  - STAT hemoglobin if s/s of bleeding, transfuse if <7  - trend CMP, CBC    Hx of COPD  Acute hypercapnic respiratory failure   COPD managed with scheduled duonebs and albuterol prn OP. Decompensated given above, and required intubation 1/29, extubated 02/01. Now requiring HFNC. No baseline O2 requirements.   - Continue HFNC to keep sats >88%  - continue Breo ellipta inhaler, duonebs QID, albuterol nebs prn  - IS and acapella    Alcohol abuse, concern for withdrawal. Pt denies alcohol abuse, states that he only drinks on the weekends. Admits to drinking a 6-pack of beer or pint of hard liquor daily on the weekend. LFTs elevated on admission, currently trending down but ALT/AST continue to be  elevated in 2:1 ratio suggestive of alcohol abuse ( ALT 89, ).  - PO thiamine, folate  - Trend CMP    Leukocytosis. Likely reactive, given acute decompensation and anemia. No s/s of active infection, pt afebrile. WBC count downtrending. Peak 23.0, now 13.0. No indication for antibiotics at this time.   - CBC        Diet: Adult Formula Drip Feeding: Continuous TwoCal HN; Nasoduodenal tube; Goal Rate: 40; mL/hr; Medication - Tube Feeding Flush Frequency: At least 15-30 mL water before and after medication administration and with tube clogging; Amout to Send (Nutriti...  Regular Diet Adult  Room Service  Calorie Counts  Snacks/Supplements Adult: Ensure Plus Adult Shake; Between Meals  Fluids: D5 1/2 NaCl @ 10  DVT Prophylaxis: Heparin SQ  Code Status: Full Code    Disposition Plan   Expected discharge: 2 - 3 days, recommended to prior living arrangement once hemoglobin stable and plan for liver mass w/u established, O2 needs at basline.      Entered: Lesli Monsivais 02/03/2018, 8:03 AM   Information in the above section will display in the discharge planner report.      The patient's care was discussed with the Attending Physician, Dr. Aparicio.    Lesli Monsivais  Internal Medicine Staff Hospitalist Service  [facility]  Pager: [phone]  Please see sticky note for cross cover information    Interval History   Feeling well today. Good appetite following removal of NG tube. Denies abdominal pain. Breathing comfortable on HFNC. Denies chest pain, shortness of breath, or difficulty breathing.     Data reviewed today: I reviewed all medications, new labs and imaging results over the last 24 hours.     Physical Exam   Vital Signs: Temp: 98.1  F (36.7  C) Temp src: Oral BP: 127/82   Heart Rate: 85 Resp: (!) 43 SpO2: 94 % O2 Device: High Flow Nasal Cannula (HFNC)    Weight: 156 lbs 4.9 oz  General Appearance: Alert and oriented x 3, NAD  Respiratory: breathing non labored, lungs with diffuse expiratory  wheeze  Cardiovascular: RRR, no murmurs or gallops  GI: abdomen distended, tense, non tender to palpation  Skin: warm and dry to touch, no rashes or excoriations  Other: no peripheral edema          Data   Data     Recent Labs  Lab 02/03/18  0508 02/02/18  0401 02/01/18  1325 02/01/18  0440  01/30/18  1217  01/28/18  2148   WBC 13.0* 14.0*  --  16.5*  < >  --   < > 16.6*   HGB 8.9* 8.5*  --  8.4*  < >  --   < > 9.2*   MCV 98 96  --  93  < >  --   < > 100    265  --  253  < >  --   < > 286   INR  --   --   --   --   --  1.01  --  0.98   * 145* 144 141  < >  --   < > 136   POTASSIUM 3.7 3.9 4.2 4.1  < >  --   < > 4.5   CHLORIDE 108 108 106 104  < >  --   < > 104   CO2 30 31 30 30  < >  --   < > 25   BUN 24 21 18 18  < >  --   < > 15   CR 0.53* 0.54* 0.67 0.68  < >  --   < > 0.69   ANIONGAP 6 6 8 7  < >  --   < > 8   JACKELIN 8.0* 8.0* 7.9* 7.7*  < >  --   < > 7.6*   * 136* 95 100*  < >  --   < > 122*   ALBUMIN 2.1*  --   --   --   --   --   --  2.7*   PROTTOTAL 5.9*  --   --   --   --   --   --  6.5*   BILITOTAL 2.3*  --   --   --   --   --   --  1.2   ALKPHOS 111  --   --   --   --   --   --  120   ALT 89*  --   --   --   --   --   --  121*   *  --   --   --   --   --   --  418*   < > = values in this interval not displayed.

## 2018-02-04 NOTE — PLAN OF CARE
Problem: Patient Care Overview  Goal: Plan of Care/Patient Progress Review  Outcome: No Change  [id number]  A&O x4, uses call light appropriately. Prefers to wear high flow NC vs oxyplus mask. O2 sats remain 92-95%. Denies pain. Showered independently. Feeling close to baseline, hopeful to discharge soon. No complaints, no acute issues. Continue to monitor and follow POC.

## 2018-02-04 NOTE — PLAN OF CARE
Problem: Patient Care Overview  Goal: Plan of Care/Patient Progress Review  Pt is a/o x 4 overnight. Continues to have tachypnea with respiratory rate between 26-32. Resp shallow. LS diminished and occasionally wheezy. Sats 93-98% on 4L oxyplus.  Neb x 1. Tmax overnight 100.8. Had melatonin for sleep and appeared to sleep soundly between cares after med. PICC line occluded this am per lab. RN was able to flush all lines and blood return from red and purple. Gray line flushes but no blood return. All lines HL'd this am. Gets up to BR with SBA. Had BM. No signs of bleeding. Will continue to monitor pt.

## 2018-02-04 NOTE — PLAN OF CARE
Problem: Patient Care Overview  Goal: Plan of Care/Patient Progress Review  Outcome: Improving  Pt A&O, VSS on 4L oxyplus mask. 4A transfer, arrived at 1630. Uses high flow nasal cannula at mealtimes. Voiding in bathroom, had BM today. NG pulled this afternoon, pt tolerating regular diet. Denies pain. Calls appropriately and makes needs known. Will continue to monitor and follow POC.

## 2018-02-04 NOTE — PLAN OF CARE
Problem: Patient Care Overview  Goal: Plan of Care/Patient Progress Review  Outcome: No Change  Pt A&O, VSS on 4 L O2. Weaned off of high flow NC today, able to ambulate and maintain sats in the 90's with 4 L O2 via nasal cannula or oxi plus mask. Good appetite, ambulating frequently in room and hallways with family. Denies pain or nausea. Voiding normally, reports BM today. Calls appropriately and makes needs known, will continue to monitor and follow POC.

## 2018-02-04 NOTE — PROGRESS NOTES
Surgical Oncology Progress Note:  2/4/2018    Mr Ferrer is doing well this AM.  He remains on supplemental O2.  He does not complain of any abdominal pain.  Hgb level has remained stable without further need for transfusion.  He was tired this morning and wanted to sleep.  We will return tomorrow to discuss next steps in the workup and management of his liver tumor with Mr Ferrer and his family tomorrow.    -Favio Pineda MD  Chief Resident, Surgical Oncology service

## 2018-02-04 NOTE — PROGRESS NOTES
[facility], [place]    Internal Medicine Progress Note - Gold Service      Assessment & Plan   Naseem Ferrer is a 62 year old male with a hx of COPD who was admitted to SICU 01/28/2018 from OSH with worsening abdominal pain, large liver lesion seen on CT, drop in hemoglobin with concern for lesions rupture and bleeding, with subsequent worsening respiratory status requiring intubation and urgent IR embolization 01/29. Pt extubated 02/01 and transferred to medicine service 02/02/18 with stable Hgb.     # Liver Mass  # Transaminitis  # Acute blood loss anemia  CT and MRI of abdomen 1/28 found to have large mass in R lobe of liver. Scheduled to undergo liver biopsy, but given worsening abdominal pain presented to OSH ED where he was found to be tachycardic w/ hgb of 9,  with concern for hemorrhage of mass, prompting transfer to [facility] 01/28/18. Hgb further dropped to 6.4, prompting emergent embolization 01/29. S/p 3 units RBC, 2 units FFP since admission. Surgical oncology evaluated pt without current plans for surgical intervention. Hgb stable since embolization. AFP 2.8. LFTs downtrending, but ALT/AST continue to be elevated in 2:1 ratio suggestive of alcohol abuse.   - will need biopsy of liver mass once stable, plan for OP f/u   - Surgical Oncology following, plan to discuss next steps in workup and management with family Monday 2/5/18  - STAT hemoglobin if s/s of bleeding, transfuse if <7  - trend CMP, CBC    Hx of COPD  Acute hypercapnic respiratory failure  COPD managed with scheduled duonebs and albuterol prn OP. Decompensated given above, and required intubation 1/29, extubated 02/01. Now requiring HFNC. No baseline O2 requirements.   - Continue HFNC to keep sats >88%  - continue Breo ellipta inhaler, duonebs QID, albuterol nebs prn  - IS and acapella    Alcohol abuse, concern for withdrawal. Pt denies alcohol abuse, states that he only drinks on the weekends. Admits to drinking a  6-pack of beer or pint of hard liquor daily on the weekend. LFTs elevated on admission, currently trending down but ALT/AST continue to be elevated in 2:1 ratio suggestive of alcohol abuse.  - PO thiamine, folate  - Trend CMP  - DC MSSA protocol    Leukocytosis. Likely reactive, given acute decompensation and anemia. No s/s of active infection, pt afebrile. WBC count downtrending. Peak 23.0, now 12.1. No indication for antibiotics at this time.   - trend CBC        Diet: Regular Diet Adult  Room Service  Calorie Counts  Snacks/Supplements Adult: Ensure Plus Adult Shake; Between Meals  Fluids: D5 1/2 NaCl @ 10  DVT Prophylaxis: Heparin SQ  Code Status: Full Code    Disposition Plan   Expected discharge: 2 - 3 days, recommended to prior living arrangement once hemoglobin stable and plan for liver mass w/u established, O2 needs at basline.      Entered: Lesli Monsivais 02/04/2018, 8:32 AM   Information in the above section will display in the discharge planner report.      The patient's care was discussed with the Attending Physician, Dr. Aparicio.    Lesli Monsivais  Internal Medicine Staff Hospitalist Service  [facility]  Pager: [phone]  Please see sticky note for cross cover information    Interval History   Feeling great today. Continues to require supplemental O2 to maintain sats >88%. Denies shortness of breath or difficulty breathing. No fevers, chills or coughs. Denies chest pain. Abdominal pain resolved.     Data reviewed today: I reviewed all medications, new labs and imaging results over the last 24 hours.     Physical Exam   Vital Signs: Temp: 98.8  F (37.1  C) Temp src: Oral BP: 124/63 Pulse: 91 Heart Rate: 90 Resp: 20 SpO2: 96 % O2 Device: Oxi Plus Oxygen Delivery: 4 LPM  Weight: 156 lbs 4.9 oz  General Appearance: Alert and oriented x 3, NAD  Respiratory: breathing non labored, lungs with diffuse expiratory wheeze  Cardiovascular: RRR, no murmurs or gallops  GI: abdomen distended,  tense, non tender to palpation  Skin: warm and dry to touch, no rashes or excoriations  Other: no peripheral edema          Data   Data     Recent Labs  Lab 02/04/18  0541 02/03/18  0508 02/02/18  0401  01/30/18  1217  01/28/18  2148   WBC 12.1* 13.0* 14.0*  < >  --   < > 16.6*   HGB 8.8* 8.9* 8.5*  < >  --   < > 9.2*   MCV 98 98 96  < >  --   < > 100    300 265  < >  --   < > 286   INR  --   --   --   --  1.01  --  0.98    145* 145*  < >  --   < > 136   POTASSIUM 4.0 3.7 3.9  < >  --   < > 4.5   CHLORIDE 106 108 108  < >  --   < > 104   CO2 28 30 31  < >  --   < > 25   BUN 22 24 21  < >  --   < > 15   CR 0.57* 0.53* 0.54*  < >  --   < > 0.69   ANIONGAP 7 6 6  < >  --   < > 8   JACKELIN 8.0* 8.0* 8.0*  < >  --   < > 7.6*   GLC 96 113* 136*  < >  --   < > 122*   ALBUMIN 2.0* 2.1*  --   --   --   --  2.7*   PROTTOTAL 5.9* 5.9*  --   --   --   --  6.5*   BILITOTAL 1.8* 2.3*  --   --   --   --  1.2   ALKPHOS 100 111  --   --   --   --  120   ALT 73* 89*  --   --   --   --  121*   AST Unsatisfactory specimen - hemolyzed 216*  --   --   --   --  418*   < > = values in this interval not displayed.

## 2018-02-04 NOTE — PROGRESS NOTES
Gold Crosscover Note:   Called by RN to assess if MSSA protocol still needed, scoring protocol ordered but no PRN meds ordered for MSSA protocol. On chart review, Pt has been admitted since 1/28/18 and has not required benzos for >48 hours. MSSA scoring protocol discontinued. Pt also asking for something for sleep, melatonin PRN ordered.   Mattie Hayes MD  [phone]

## 2018-02-05 NOTE — PROGRESS NOTES
Care Coordinator Progress Note     Admission Date/Time:  1/28/2018  Attending MD:  Juanito Can*     Data  Chart reviewed, discussed with interdisciplinary team.   Patient was admitted for:    Liver lesion  Chronic obstructive pulmonary disease, unspecified COPD type (H).    Concerns with insurance coverage for discharge needs: None.  Current Living Situation: Patient lives with family.  Support System: Supportive and Involved  Services Involved: No services involved prior to admission.   Transportation: Family or Friend will provide  Barriers to Discharge: medical clearance    Coordination of Care and Referrals: Provided patient/family with options for DME.        Assessment  Pt is a 62 year old male with PMH significant for COPD who was transferred from OSH with worsening abdominal pain and large liver lesion. Per MD team, pt will discharge today and will need home oxygen arranged. Oxygen order placed and walking saturation documented. Called [facility] (phone: [phone]) for referral who will provide pt choice of oxygen company. Pt has been getting scheduled nebulizer treatments while in the hospital. Pt stated that he has a nebulizer machine at home and was doing nebulizer treatments prior to admission. No other needs identified at this time.      Plan  Anticipated Discharge Date:  2/5/18  Anticipated Discharge Plan:  Discharge to home with new supplemental oxygen    Kenzie Ward RN

## 2018-02-05 NOTE — PROGRESS NOTES
COPD Initial Interview and Consult    2018    Patient: Naseem Ferrer      :  1955                    MRN:[id number]      Reason for Consult:  Patient with severe COPD     History of Present Illness: 62 year old man with PMH of COPD presented to an outside hospital with worsening abdominal pain over the past 2-3 weeks. Found to have a large liver lesion on CT suspicious for HCC with intraperitoneal hemorrhage with worsening respiratory status requiring intubation  s/p IR embolization .     Mention of PFTs on file in 2015 with formal diagnosis of severe COPD. Unable to view spirometry. Was given referral to follow up with Dr. Clements in [place]. Multiple clinic visits for COPD exacerbations treated with steroids and antibiotics Current e/d smoker, but ready to quit and had cut down to 4 cigarettes daily but previously smoked 1 1/2 ppd. Patient endorses daily sinus congestion but does not take his prescribed Flonase medications for this.        Home respiratory medications include:  -Albuterol (Proair/Ventolin/Proventolin)  Albuterol/Ipratropium (Duoneb, Combivent)  Serevent/Fluticasone (Advair) 100/50        Assessment:  SpO2 on 5L NC 91%, patient dropped to 87% on RA while sitting upright in bed. HR 96, RR 18, coarse junky cough, BBS diminished throughout with scattered wheezes.       Recommendations:  -Continue with current bronchodilator medication. Change current prescription of Advair 100/50 to 250/50; this dose is approved for COPD    -Establish care with a Pulmonologist and get complete PFT's     -Provide free Alpha-1 Antitrypsin Deficiency Testing with consent    -Use Aerobika Oscillating PEP Device at least twice daily for 5-10 min/to open smaller airways, improve mucus clearance, to decrease cough frequency, and to improve exercise tolerance    -Patient needs continued reinforcement and continued education on inhaler use and breath recovery techniques    -Referral for outpatient  pulmonary Rehab    -Referral for sleep consult to assess for sleep disordered breathing, PADMINI, nocturnal hypoxia, and hypoventilation        Will continue to follow and support patient as needed. Will follow up with phone call 48 hours after discharge.     I spent 60 minutes with the patient.    Riana Dillard, RRT, TTS  Chronic Pulmonary Disease Specialist  [phone]

## 2018-02-05 NOTE — PLAN OF CARE
Problem: Patient Care Overview  Goal: Plan of Care/Patient Progress Review  Outcome: Adequate for Discharge Date Met: 02/05/18  Pt discharged home via family. Meds received from pharmacy. All teaching done with previous RN. Belongings gathered per pt. Adequate for d/c.

## 2018-02-05 NOTE — PROGRESS NOTES
"S: No acute events overnight. Ambulating and urinating independently. Remains on 4L of supplemental oxygen. Patient eager to go home for recovery. 1x BM yesterday.    Objective  Vital signs:  Temp: 97.8  F (36.6  C) Temp src: Oral BP: 123/71 Pulse: 99 Heart Rate: 89 Resp: 24 SpO2: 90 % O2 Device: Oxi Plus Oxygen Delivery: 4 LPM Height: 163.8 cm (5' 4.5\") Weight: 66.5 kg (146 lb 9.7 oz)  Estimated body mass index is 24.78 kg/(m^2) as calculated from the following:    Height as of this encounter: 1.638 m (5' 4.5\").    Weight as of this encounter: 66.5 kg (146 lb 9.7 oz).    No acute distress  Non-labored breathingon 4 L oxiplus  Regular rate  Abdomen soft, nontender, nondistended  Extremities warm and well perfused    I/O last 3 completed shifts:  In: 360 [P.O.:360]  Out: 275 [Urine:275]    Labs/Imaging  Labs pending    A/P  Naseem Ferrer is a 62 year old male who presented with bleeding right sided liver tumor s/p IR embolization on 1/29.    -Will plan for discussion with family and patient later today regarding additional workup and outpatient follow up    Patient will be discussed with Dr. Maricel Ferreira MD   Gen Surg PGY-2  P: [id number]      "

## 2018-02-05 NOTE — PROGRESS NOTES
Patient with known COPD  Evaluated by me         Patient has been assessed for Home Oxygen needs. Oxygen readings:     *Pulse oximetry (SpO2) = 79%% on room air at rest while awake.     *SpO2 improved to 88% on 6liters/minute at rest.     *SpO2 = 69% on room air during activity/with exercise.     *SpO2 improved to 88% on 8liters/minute during activity/with exercise.        Recommend discharging home with home oxygen  6-8  lts         Dr GINA Aparicio MD, [facility]  Hospitalist ( Internal medicine)  Pager: [phone]

## 2018-02-05 NOTE — PROGRESS NOTES
PT / 5A -     Discharge Planner PT   Patient plan for discharge: home + OP PT  Current status: Performing bed mobility and transfers IND. Performing bathroom tasks independently.  Engaged in amb with no AD and pt performed and scored a 24/24 on Dynamic Balance Index (see additional note).  Climbed 7 additional stairs independently.  Barriers to return to prior living situation: supplemental O2  Recommendations for discharge: home with home O2 (see note) + OP PT  Rationale for recommendations: to continue progressing strength and activity tolerance.       Entered by: María Elena Wilburn 02/05/2018 10:44 AM            Physical Therapy Discharge Summary    Reason for therapy discharge:    All goals and outcomes met, no further needs identified.    Progress towards therapy goal(s). See [facility] on [facility] record for goal details.  Goals met    Therapy recommendation(s):    Continued therapy is recommended.  Rationale/Recommendations:  see above.

## 2018-02-05 NOTE — INTERIM SUMMARY
Palliative Care Interim Summary  Pt being discharged to home this afternoon.  D/W Michael PINON.  GOC/sx management, in setting of new liver mass still being worked up, have been addressed; will sign off at this time 2/2 planned DC today.    Isaias Whelan MD  Palliative Medicine Consult Team  Pager: [phone]

## 2018-02-05 NOTE — PROGRESS NOTES
Surgical Oncology RN Care Coordination Note:     Patient discussed with Dr. Connelly regarding hospital follow up for surgical clearance. Per discussion Dr. Connelly would like pt urgently evaluated by Cardiology and Pulmonary for clearance for liver surgery. Orders placed and routed to scheduling to set up consults for pt.     Chante Jacques, RN, BSN  Care Coordinator - Dr. Rene  [phone]

## 2018-02-05 NOTE — PLAN OF CARE
Problem: Patient Care Overview  Goal: Plan of Care/Patient Progress Review  PT / 5A -     Patient has been assessed for Home Oxygen needs. Oxygen readings:    *Pulse oximetry (SpO2) = 79%% on room air at rest while awake.    *SpO2 improved to 88% on 6liters/minute at rest.    *SpO2 = 69% on room air during activity/with exercise.    *SpO2 improved to 88% on 8liters/minute during activity/with exercise.

## 2018-02-05 NOTE — DISCHARGE INSTRUCTIONS
My Individualized COPD Treatment Plan    Name:    Naseem Ferrer                                                   Date:  2/5/2018        My Clinic:  [facility] [address] [address]    My Primary Care Provider:  Jonna Clements      My Pulmonologist:  HERVE    My Chronic Pulmonary Disease Specialists:  Office  [phone]       Gissel Dillard    (Available M - F  8:00 - 4:30PM)  Call with any questions or concerns    My Pharmacy:  Data Unavailable      My controller medicines(s) frequency:  Serevent/Fluticasone (Advair) 2 times a day; rinse and gargle mouth after each use    My rescue medicine(s) frequency:  Albuterol (Proair/Ventolin/Proventil) inhaler Duoneb four times a day    Oxygen use/settings:  6-8 Liters continuously      DME (name/phone number): [facility] [phone]    Aerobika/frequency:  -Use Aerobika Oscillating PEP Device at least twice daily for 5-10 min/ to open smaller airways, improve mucus clearance, to decrease cough frequency, and to improve exercise tolerance

## 2018-02-05 NOTE — PLAN OF CARE
Problem: Patient Care Overview  Goal: Plan of Care/Patient Progress Review  Outcome: No Change  Afebrile, VSS on 4L nasal canula.  Alert and oriented x4.  Denies pain or nausea.  Up independently with walker.  Able to make needs known.  Continue plan of care.

## 2018-02-05 NOTE — PLAN OF CARE
Problem: Patient Care Overview  Goal: Plan of Care/Patient Progress Review  Outcome: Improving  TMAX 100.0 oral. Tachycardia intermittent (104). Using 4LPM O2 via Oxi Plus with continuous pulse oximetry. Septic protocol triggered this morning; awaiting STAT lactic acid. A&O. Up independently. Regular diet. No complaints of nausea, no emesis. Saline locked to PIV. PICC heparin locked. Voiding wnl's. No stools reported. No complaints of pain.

## 2018-02-05 NOTE — PROGRESS NOTES
Received intake call for home oxygen at 11:17AM. Reviewed patient's chart; Patient qualifies under Medicare guidelines and all documentation is in the chart including a good order.   11:38AM- Called to offer choice and patient is okay with [facility] setting them up. Discussed equipment with patient and informed them that we would be to bedside with oxygen and then once discharged setup will be finished in home.  11:30AM- Spoke with care coordinator, Kenzie, confirmed we received the order, and provided them with ETA of oxygen.

## 2018-02-05 NOTE — DISCHARGE SUMMARY
[facility], [place]    Internal Medicine Discharge Summary- Gold Service    Date of Admission:  1/28/2018  Date of Discharge:  2/5/2018  Discharging Provider: Michael Moran PA-C; Dr. Aparicio  Discharge Team: Gold 2    Discharge Diagnoses   Liver Mass suspicious for HCC  Acute Blood Loss Anemia  COPD  Acute Hypercapnic Respiratory Failure  Transaminitis  Alcohol Abuse  Leukocytosis    Follow-ups Needed After Discharge   Follow up with Pulmonology for formal PFTs and establishing care  Follow up in Pulmonary Rehab  Follow up with Surgical Oncology for liver biopsy  Follow up with Cardiology for pre-op clearance  Follow up with PCP within 1 week for repeat CBC, CMP  Follow up with Sleep Medicine    Hospital Course   Naseem Ferrer was admitted to the SICU on 01/28/2018 for large liver lesion on CT w/ drop in Hgb concerning for rupture of lesion. He required intubation and urgent IR embolization 01/29. He was transferred to the medicine service 02/02/18. The following problems were addressed during his hospitalization:    Acute blood loss anemia likely 2/2 hemorrhage of liver mass (suspicious for HCC). CT and MRI of abdomen 1/28 found to have large mass in R lobe of liver. Scheduled to undergo liver biopsy, but given worsening abdominal pain presented to OS ED where he was found to be tachycardic w/ hgb of 9,  with concern for hemorrhage of mass, prompting transfer to [facility] 01/28/18. Hgb further dropped to 6.4, prompting emergent embolization 01/29. S/p 3 units RBC, 2 units FFP since admission. Surgical oncology evaluated pt with plans for OP liver biopsy. Hgb stable since embolization. AFP 2.8. LFTs downtrending, but ALT/AST continue to be elevated in 2:1 ratio suggestive of alcohol abuse. Needs repeat CMP and CBC with PCP in 1 week.     H/o COPD, Acute hypercapnic respiratory failure. COPD managed with scheduled duonebs and albuterol prn OP but patient is likely non-compliant  based on my discussion w/ his wife. Decompensated this admission given above, and required intubation 1/29, extubated 02/01. PTA without O2 requirements, but suspect he is chronically hypoxic given he is currently requiring 6L NC at rest. He is asymptomatic when he is hypoxic. He was maintained on a Breo-Ellipta inhaler and duonebs QID, which was continued on discharge. He will have Pulm follow up to establish care and complete formal PFTs. Pt smoking 3-4 cigarettes PTA - motivated to quit. Sleep medicine referral placed.    Alcohol abuse, concern for withdrawal. Pt denies alcohol abuse, states that he only drinks on the weekends. Admits to drinking a 6-pack of beer or pint of hard liquor daily on the weekend. LFTs elevated on admission, with ALT/AST elevated in 2:1 ratio suggestive of alcohol abuse. Continued on thiamine, folate, MVI. He was placed on the MSSA protocol. Needs repeat CMP w/ PCP.     Leukocytosis. Likely reactive, given acute decompensation and anemia. No s/s of active infection, pt afebrile. WBC count downtrending. Peak 23.0, now 13.5. No indication for antibiotics, therefore will have repeat CBC w/ PCP to ensure downtrending.    Consultations This Hospital Stay   PALLIATIVE CARE ADULT IP CONSULT  INTERVENTIONAL RADIOLOGY ADULT/PEDS IP CONSULT  VASCULAR ACCESS CARE ADULT IP CONSULT  VASCULAR ACCESS CARE ADULT IP CONSULT  VASCULAR ACCESS CARE ADULT IP CONSULT  PHYSICAL THERAPY ADULT IP CONSULT  OCCUPATIONAL THERAPY ADULT IP CONSULT  VASCULAR ACCESS ADULT IP CONSULT  NUTRITION SERVICES ADULT IP CONSULT  NUTRITION SERVICES ADULT IP CONSULT  PHARMACY IP CONSULT  PHYSICAL THERAPY ADULT IP CONSULT  IP RESPIRATORY CARE CHRONIC PULMONARY DISEASE SPECIALIST  MEDICATION HISTORY IP PHARMACY CONSULT     Code Status   Full Code    Time Spent on this Encounter   I, Michael Moran, personally saw the patient today and spent greater than 30 minutes discharging this patient.       Michael Moran  Internal  Medicine Staff Hospitalist Service  [facility]  Pager: [phone]  ______________________________________________________________________    Physical Exam   Vital Signs: Temp: 97.8  F (36.6  C) Temp src: Oral BP: 137/69 Pulse: 104 Heart Rate: 89 Resp: 22 SpO2: 90 % O2 Device: Oxi Plus Oxygen Delivery: 4 LPM  Weight: 146 lbs 9.69 oz    General Appearance: A&Ox3. NAD.  Respiratory: Mildly increased respiratory effort on 4L OxyMask. Lungs w/ diffuse wheezing.  Cardiovascular: RRR. S1, S2.  GI: Abdomen soft, non-tender, non-distended.  Skin: No rashes on exposed areas of skin.  Other: No peripheral edema.    Significant Results and Procedures   Most Recent 3 CBC's:  Recent Labs   Lab Test  02/05/18   0942  02/04/18   0541  02/03/18   0508   WBC  13.5*  12.1*  13.0*   HGB  9.6*  8.8*  8.9*   MCV  98  98  98   PLT  356  350  300     Most Recent 3 BMP's:  Recent Labs   Lab Test  02/04/18   0541  02/03/18   0508  02/02/18   0401   NA  141  145*  145*   POTASSIUM  4.0  3.7  3.9   CHLORIDE  106  108  108   CO2  28  30  31   BUN  22  24  21   CR  0.57*  0.53*  0.54*   ANIONGAP  7  6  6   JACKELIN  8.0*  8.0*  8.0*   GLC  96  113*  136*     Most Recent 2 LFT's:  Recent Labs   Lab Test  02/04/18   0541  02/03/18   0508   AST  Unsatisfactory specimen - hemolyzed  216*   ALT  73*  89*   ALKPHOS  100  111   BILITOTAL  1.8*  2.3*   ,   Results for orders placed or performed during the hospital encounter of 01/28/18   XR Chest Port 1 View    Narrative    XR CHEST PORT 1 VW  1/28/2018 10:13 PM      HISTORY: Preop/baseline    COMPARISON: None    FINDINGS: Single portable frontal view of the chest at 60 degree.  There is a slight oblique projection, limiting evaluation. Normal  heart size. Pulmonary vasculature within normal limits. No focal  consolidation. There is a lucent tubular structure, deviating to the  left side of the mediastinum, presumably distended esophagus. No  pleural effusion or pneumothorax.      Impression     IMPRESSION:   1. Lungs are clear.  2. Presumably mildly distended esophagus, although evaluation is  limited by the suboptimal projection. May consider repeat PA and  lateral if clinically indicated.    I have personally reviewed the examination and initial interpretation  and I agree with the findings.    NABILA ESCAMILLA MD   CT [facility]    Narrative    EXAMINATION: CT [facility], 1/31/2018 9:39 AM    TECHNIQUE: Outside films dated 1/28/2018 were submitted for  interpretation. CT images extending from lung bases through the  proximal femurs were performed with contrast.    COMPARISON: 1/17/2018    PREVIOUS REPORT: The original interpretation was available for review  at the time of this dictation.     HISTORY: patient with abdominal pain of 2-3 weeks duration, found to  have mass in the liver;     FINDINGS:   This report is designed to answer a focused clinical  question and should be interpreted in conjunction with the original  report.     13.9 x 8.0 x 11.3 cm heterogeneously enhancing mass replacing a large  portion of the right hepatic lobe and deforming the overlying hepatic  capsule at several locations. Associated intermediate density  perihepatic fluid which extends inferiorly along the paracolic gutters  into the pelvis.    The gallbladder, pancreas, adrenal glands, and spleen (small splenule  noted medially) are within normal limits. Several bilateral renal  cortical hypodensities are too small to fully characterize but favored  to represent cysts. No hydronephrosis, hydroureter, or urinary tract  stone. The bladder is within normal limits. No free air. No bowel  obstruction or inflammation. Numerous distal colonic diverticula.  Normal appendix. The major abdominal vessels are patent. Diffuse  atherosclerotic calcification of the abdominal aorta and iliac  arteries without aneurysmal dilation. No abdominal or pelvic  lymphadenopathy.    The lung bases are clear. No acute or aggressive  osseous lesion.      Impression    IMPRESSION:   1. 13.9 x 8.0 x 11.3 cm heterogeneously enhancing mass in the right  hepatic lobe is suspicious for malignancy. Differential includes  hepatocellular carcinoma (especially fibrolamellar subtype),  angiosarcoma, or other malignancy.   2. Dense ascites is compatible with hemoperitoneum, presumably related  to bleeding from the hepatic tumor which extends to and deforms the  hepatic capsule at multiple locations.    I have personally reviewed the examination and initial interpretation  and I agree with the findings.    BERTHA ARITA MD   [facility]    Narrative    EXAMINATION: MR [facility] OVERREAD, 1/31/2018 9:39 AM    TECHNIQUE: Outside films dated 1/19/2018 were submitted for  interpretation. Multisequence multiplanar MR images of the abdomen  were performed without and with contrast.    COMPARISON: 1/17/2018 CT chest    PREVIOUS REPORT: The original interpretation was available for review  at the time of this dictation.     HISTORY: please see CT order;     FINDINGS:   This report is designed to answer a focused clinical  question and should be interpreted in conjunction with the original  report.     13.1 x 7.7 x 10.8 cm heterogeneously enhancing mass replacing a large  portion of the right hepatic lobe and deforming the overlying hepatic  capsule at several locations. No evidence of invasion into the  adjacent abdominal wall. The mass progressively enhances from  immediate post injection images through 5 minutes postinjection but  remains relatively hypointense to the surrounding hepatic parenchyma.  The mass demonstrates multifocal regions of internal T2 hyperintensity  as well as several foci of internal enhancement and internal T1  hyperintensity, compatible with necrosis and internal hemorrhage. No  evidence of tumor thrombus. The liver is otherwise unremarkable  without morphologic evidence of cirrhosis.    The gallbladder, pancreas, adrenal  glands, and spleen (small splenule  noted medially) are within normal limits. Bilateral T2 hyperintense,  nonenhancing renal cysts. No hydronephrosis. No bowel obstruction or  inflammation. Numerous distal colonic diverticula. The major abdominal  vessels are patent where visualized. No lymphadenopathy.    The lung bases are clear. No acute or aggressive osseous lesion.      Impression    IMPRESSION:   13.1 x 7.7 x 10.8 cm heterogeneously enhancing mass in the right  hepatic lobe is suspicious for malignancy. Differential includes  hepatocellular carcinoma (especially fibrolamellar subtype),  angiosarcoma, or other malignancy. No morphologic evidence of  cirrhosis.    I have personally reviewed the examination and initial interpretation  and I agree with the findings.    BERTHA ARITA MD   XR Chest Port 1 View    Narrative    Exam: XR CHEST PORT 1 VW, 1/29/2018 4:58 PM    Indication: s/p intubation; OG placement;     Comparison: 1/20/2018    Findings:   Single portable view of the chest. Interval placement of endotracheal  tube terminating approximately 3 cm above the nikki. Interval  placement of orogastric tube projecting over the esophagus and stomach  with sidehole distal to the GE junction. The cardiomediastinal  silhouette is unremarkable. Aortic knob with calcifications. New mild  asymmetric elevation of the left hemidiaphragm relative to the right.    Small layering left pleural effusion. No right pleural effusion. No  pneumothorax. Left lower lobe and retrocardiac opacities, worse from  the prior study with interval silhouetting of the left heart border.  The right lung is clear.      Impression    Impression:   1. Interval placement of endotracheal tube terminating approximately 3  cm above the nikki.  2. Interval placement of enteric tube with sidehole distal to the GE  junction.  3. Left lower lobe and retrocardiac opacities, worse from the prior  study with mild asymmetric elevation of the left  hemidiaphragm  relative to the right and interval silhouetting of the left heart  border and diaphragm, atelectasis versus infection.    I have personally reviewed the examination and initial interpretation  and I agree with the findings.    ASHLEIGH MARTINEZ MD   IR Visceral Embolization    Narrative    Procedures:  1. Ultrasound-guided right common femoral arteriotomy.  2. Aortic, celiac, superior mesenteric, common hepatic, proper  hepatic, right hepatic, segmental/subsegmental right hepatic  angiography.  3. Fifth order subselective hepatic artery catheterization and  particle embolization.    History: Bleeding liver mass.    Comparison: 1/17/2018, 1/19/2018, 1/29/2018    Staff: Kiara HU I, KIARA ZARATE MD, attest that I was present for all critical  portions of the procedure and was immediately available to provide  guidance and assistance during the remainder of the procedure.    Fellow: Huber Ernst MD    Patient remained on propofol from the ICU. Patient continued to have  hypertension prior to and during the beginning of the procedure. His  blood pressure was systolic of approximately 200 before and 110 after  administration of 1 mg Versed. After this, vital Signs and oxygenation  remained stable throughout the procedure.    Fluoroscopy time: 35.8 minutes    Contrast: 100 mL Visipaque 320    Findings/procedure:    Prior to the procedure, both verbal and written informed consent  obtained from the patient. Patient prepped and draped in the usual  sterile fashion. Timeout performed. Ultrasound was used to identify  the femoral head and common femoral bifurcation. Skin overlying the  bifurcation was anesthetized 1% lidocaine. Under ultrasound guidance,  a 21-gauge micropuncture needle was advanced into the common femoral  artery and image saved. Modified Seldinger technique used to insert a  10 cm 5 Chinese sheath, and a C2 Cobra glide catheter was advanced over  a Bentson wire to the level of  T12. This is used to engage the celiac  axis and angiography performed demonstrating a conventional celiac  trunk anatomy. There is difficulty catheterizing the common hepatic  artery and a variety of wires and catheters were used with ultimate  success obtained with a 0.035 angled Glidewire and a 5 Occitan  Pamela catheter. A 0.018 Progreat wire and 2.8 Occitan Progreat  catheter were used to catheterize the hepatic artery and angiography  performed. This showed a large mass with pooling contrast arising from  branches of the right hepatic artery. The Progreat wire and  microcatheter were used to subselect the posterior divisional artery.  100 units of nitroglycerin were given through the microcatheter. This  artery was embolized with 300-500 um embospheres. Stasis was achieved  relatively quickly.. Angiography refluxing into the adjacent, splayed  right hepatic artery branches did not show any significant tumor  supply. A single attempt was made to advance the wire microcatheter  beyond the level of occlusion to evaluate for complete embolization.  However no return of blood flow is identified, only parenchymal blush.  Completion angiography showed complete stasis to the posterior  division feeding the tumor. Catheters were removed and a 5 Occitan minx  closure device deployed successfully. Hemostasis achieved with manual  compression. Sterile dressing applied.      Impression    Impression:  Successful bland particle embolization of the posterior divisional  artery feeding a large right hepatic lobe mass with embospheres.    Plan:     1. Routine post angiogram care.  2. Interventional radiology will continue to follow this patient.  One-month follow-up could be arranged in Dr. Zarate's clinic after  patient's discharge.    I have personally reviewed the examination and initial interpretation  and I agree with the findings.    KIARA ZARATE MD   XR Chest Port 1 View    Narrative    EXAM: XR CHEST PORT 1 VW   1/30/2018 12:14 PM      HISTORY: Picc placement     COMPARISON: Radiograph 1/29/2018    FINDINGS: AP radiograph of the chest. Right IJ central venous catheter  tip projects over the right atrium. Endotracheal tube projects 2.5 cm  above the nikki. Gastric tube passes below the diaphragm and is  coming off the field-of-view. Cardiac silhouette is stable. Unchanged  left basilar and lingular opacities. No pneumothorax. Worsening  perihilar opacities.      Impression    IMPRESSION:   1. Right upper extremity PICC tip projects over the right atrium.  2. Worsening perihilar opacities, which may indicate infection or  pulmonary edema.   3. Stable left basilar and lingular opacities, which may represent  atelectasis or infection.    I have personally reviewed the examination and initial interpretation  and I agree with the findings.    MEJIA MISTRY MD   XR Chest Port 1 View    Narrative    XR CHEST PORT 1 VW  1/31/2018 7:49 PM      HISTORY: RN placed PICC - verify tip placement;     COMPARISON: 1/30/2018    FINDINGS: Right-sided PICC with tip in the lower SVC. Gastric tube and  sidehole over the stomach. Endotracheal tube is 3.2 cm above the  nikki. Cardiac silhouette is stable and normal. Pulmonary vasculature  is mildly indistinct. Marked improvement in left pleural effusion and  associated opacities. Improving right lung opacities. No pneumothorax.      Impression    IMPRESSION: Right-sided PICC with tip in the lower SVC. Improving  bilateral opacities and left pleural effusion with atelectasis.    I have personally reviewed the examination and initial interpretation  and I agree with the findings.    ARIADNA BETHEA MD ([place])   XR Abdomen Port 1 View    Narrative    XR ABDOMEN PORT F1 VW  1/31/2018 10:18 PM      HISTORY: Verify small bowel feeding tube bedside placement;     COMPARISON: None    FINDINGS: Gastric tube with sidehole projecting over the body of the  stomach. Nonobstructive bowel gas pattern. Moderate  stool burden.  Right central venous catheter tip projecting over the mid to low SVC.      Impression    IMPRESSION: Gastric tube sidehole projecting over the body of the  stomach.    I have personally reviewed the examination and initial interpretation  and I agree with the findings.    KANDY ZAZUETA MD   XR Abdomen Port 1 View    Narrative    Examination:  XR ABDOMEN PORT F1 VW 2/1/2018 12:39 PM     Comparison: Abdominal x-ray 1/31/2018    History: Verify small bowel feeding tube bedside placement;     Findings: Portable radiograph of the abdomen. Interval removal of the  gastric tube. Feeding tube tip projecting over the proximal jejunum.  Right central venous catheter tip overlying the low SVC, stable in  position. No dilated loops of bowel in the upper abdomen. Streaky  linear density in the left lung base.      Impression    Impression:   1. Feeding tube tip projecting over the proximal jejunum.  2. Left basilar atelectasis, infection or aspiration.    I have personally reviewed the examination and initial interpretation  and I agree with the findings.    MARK ANTHONY ROSS MD       Pending Results     Primary Care Physician   Jonna Clements    Discharge Disposition   Discharged to home  Condition at discharge: Stable    Discharge Orders     Pulmonary Medicine Referral     Pulmonary Rehab Referral     SLEEP EVALUATION & MANAGEMENT REFERRAL - ADULT -Other (Respond in commments) (Closest to [place])     Reason for your hospital stay   You were hospitalized for a bleed in your liver lesion. No biopsy has been obtained yet, so this needs to be done in an outpatient setting.     Adult [facility] Follow-up and recommended labs and tests   Follow up with primary care provider, Physician No Ref-Primary, within 7 days for hospital follow up. Repeat CBC, CMP recommended. Discuss removing Seroquel.     Pulmonology follow up within 4 weeks of discharge for COPD optimization. You are requiring oxygen at all times.    *Upcoming Appointments*    -Saturday 2/10/18 at 10:30AM with Dr. Hoskins [place] [facility]  -Monday 2/12/18 at 12:30PM with Pulmonary Function Lab  -Monday 2/12/18 at 3:30PM with Dr. Manley [place] [facility]    Surgical Oncology follow up to discuss next step in liver biopsy.    Appointments on [place] and/or [facility] (with [facility] or [facility] provider or service). Call [phone] if you haven't heard regarding these appointments within 7 days of discharge.     Activity   Your activity upon discharge: activity as tolerated and no driving while on analgesics     When to contact your care team   Call your PCP or return to ED for temperatures > 100.7 degrees, worsening or changing pain, uncontrolled vomiting or inability to tolerate oral intake, new or worsening diarrhea, new or worsening shortness of breath, decreased urine output, yellowing of the eyes or skin, confusion, weakness, blood in urine or stools.     Discharge Instructions   You need to wear your oxygen at all times until follow up with pulmonology. Absolutely no smoking while using oxygen.     Full Code     Oxygen Adult   [place] Oxygen Order 6-8 liter(s) by nasal cannula continuously with use of portable tank. Expected treatment length is 6 months.. Test on conserving device as applicable.    Patients who qualify for home O2 coverage under the CMS guidelines require ABG tests or O2 sat readings obtained closest to, but no earlier than 2 days prior to the discharge, as evidence of the need for home oxygen therapy. Testing must be performed while patient is in the chronic stable state. See notes for O2 sats.    I certify that this patient, Naseem Ferrer has been under my care and that I, or a nurse practitioner or physician's assistant working with me, had a face-to-face encounter that meets the face-to-face encounter requirements with this patient on 2/5/2018. The patient, Naseem Ferrer was evaluated or treated in whole, or in part,  for the following medical condition, which necessitates the use of the ordered oxygen. Treatment Diagnosis: COPD    Attending Provider: Golden Can  Physician signature: See electronic signature associated with these discharge orders  Date of Order: February 5, 2018     Diet   Follow this diet upon discharge: Regular Diet       Discharge Medications   Discharge Medication List as of 2/5/2018  2:16 PM      START taking these medications    Details   folic acid (FOLVITE) 1 MG tablet Take 1 tablet (1 mg) by mouth daily, Disp-30 tablet, R-0, E-Prescribe      multivitamins with minerals (CERTAVITE/CEROVITE) LIQD liquid 15 mLs by Per Feeding Tube route daily, R-0, Historical      thiamine 100 MG tablet Take 1 tablet (100 mg) by mouth daily, Disp-30 tablet, R-0, E-Prescribe      QUEtiapine (SEROQUEL) 25 MG tablet Take 1 tablet (25 mg) by mouth 2 times daily, Disp-60 tablet, R-0, E-Prescribe      loratadine (CLARITIN) 10 MG tablet Take 1 tablet (10 mg) by mouth daily, Disp-30 tablet, R-0, OTC         CONTINUE these medications which have CHANGED    Details   ipratropium - albuterol 0.5 mg/2.5 mg/3 mL (DUONEB) 0.5-2.5 (3) MG/3ML neb solution Take 1 vial (3 mLs) by nebulization 4 times daily, Disp-360 mL, R-0, Historical      fluticasone-salmeterol (ADVAIR) 100-50 MCG/DOSE diskus inhaler Inhale 1 puff into the lungs 2 times daily, Disp-1 Inhaler, R-0, E-Prescribe         CONTINUE these medications which have NOT CHANGED    Details   albuterol (PROAIR HFA/PROVENTIL HFA/VENTOLIN HFA) 108 (90 BASE) MCG/ACT Inhaler Inhale 2 puffs into the lungs every 4 hours as needed for shortness of breath / dyspnea or wheezing, Historical      fluticasone (FLONASE) 50 MCG/ACT spray Spray 2 sprays into both nostrils daily, Historical      LORAZEPAM PO Take 0.5 mg by mouth every 8 hours as needed for anxiety Prescribed in January 2018- patient has not started taking it yet., Historical         STOP taking these medications        ASPIRIN PO Comments:   Reason for Stopping:             Allergies   Allergies   Allergen Reactions     Mold Unknown     Other reaction(s): Other  Inability to breathe     Tramadol Unknown, Other (See Comments), Diarrhea, Rash and Nausea and Vomiting

## 2018-02-05 NOTE — PLAN OF CARE
Problem: Patient Care Overview  Goal: Plan of Care/Patient Progress Review  Patient remains A&O, pleasant and up SBA. Walk test completed C.C. home O2 delivered. Patient hopeful to d/c home today; awaiting hem/onc to come discuss follow-up planning for liver lesion. COPD team to see as well d/t high oxygen requirements. Decision to discharge to home- daughter will be here around 5 pm. PICC and PIV removed per order. Discharge instructions given with verbalization of understanding- meds to be picked up when ready in d/c pharm.

## 2018-02-05 NOTE — PROGRESS NOTES
Dynamic Gait Index (DGI):The DGI is a measure of balance during gait that is reliable and valid for the elderly and individuals with Parkinson's disease, MS, vestibular disorders, or s/p stroke. Gait assistive device used: none     Patient score: 24/24  Scores ?19/24 indicate an increased risk for falls according to Brenda et al 2000  Minimal Detectable Change = 2.9 in community dwelling elderly according to Carlos et al 2011    Assessment (rationale for performing, application to patient s function & care plan): falls risk, balance, discharge recommendation  Minutes billed as physical performance test: 10           02/05/18 1041   Signing Clinician's Name / Credentials   Signing clinician's name / credentials SABA Campos   Dynamic Gait Index (Laith and Hernandez [place], 1995)   Gait Level Surface 3   Change in Gait Speed 3   Gait and Horizontal Head Turns 3   Gait with Vertical Head Turns 3   Gait and Pivot Turns 3   Step Over Obstacle 3   Step Around Obstacles 3   Steps 3   Total Dynamic Gait Index Score  (A score of 19 or less has been correlated to an increased risk of falls in community dwelling older adults, patients with vestibular disorders, and patients with MS.)   Total Score (out of 24) 24

## 2018-02-06 NOTE — PROGRESS NOTES
Pt was discharged with our oxygen yesterday afternoon. After an RT called over here and suggested that maybe the pt be setup on liquid because of the patients liter flow. I started a send out to [facility] and they said they do not do liquid oxygen. I then did a send out to [facility]. spoke with lissett at about 8:30am and talked to marah who confirmed they rcvd it and sent it out to a location close to pts home. The pts wife called at 9:40am and had a few questions about the oxygen they got yesterday. I then explained that the pt will be going through [facility] to get oxygen and then we will p/u our equipment once the other is delivered. She was a little concerned about the information because she wasn't there when he was discharged but she did say she was going to call the hospital because she thought there was going to be a follow up appt scheduled and their wasn't.

## 2018-02-06 NOTE — PLAN OF CARE
Problem: Patient Care Overview  Goal: Plan of Care/Patient Progress Review  Occupational Therapy Discharge Summary    Reason for therapy discharge:    Discharged to home.    Progress towards therapy goal(s). See goals on Care Plan in [facility] electronic health record for goal details.  Goals partially met.  Barriers to achieving goals:   discharge from facility.    Therapy recommendation(s):    No further therapy is recommended.

## 2018-02-07 NOTE — TELEPHONE ENCOUNTER
APPT INFO    Date /Time: 2/12/18, 3:30pm   Reason for Appt: COPD and SOB   Ref Provider/Clinic: JAIR MILLER   Are there internal records? Yes/No?  IF YES, list clinic names: [facility]   Are there outside records? Yes/No? Yes    Patient Contact (Y/N) & Call Details: No, referred   Action: CareEverywhere records reviewed. See CareEverywhere Tab.  Faxed cover sheet      OUTSIDE RECORDS CHECKLIST     CLINIC NAME COMMENTS REC (x) IMG (x)   [facility] OFFICE NOTES: 1/12/18, 11/14/17, 2/9/17, [id number]  RADIOLOGY: XR chest 1/12/18, 2/9/17, 9/20/15 x x

## 2018-02-08 NOTE — LETTER
February 8, 2018    Naseem Ferrer  [address] [place] [address]    Dear Naseem,     Appointment Reminder:    To check in for this appointment go to the [facility] building located at [address] [place] [address].    Your clinic appointment with Dr. Mic Renner will be on Tuesday, March 6th @ 12 pm, at the [facility], First floor imaging area.  This appointment is to follow up on your embolization procedure for your liver mass bleed.    Please feel free to call me with any questions or concerns as this date approaches.    Sincerely,    Leni Lee, RN, BSN  Interventional Radiology Care Coordinator  Office: [phone]

## 2018-02-08 NOTE — TELEPHONE ENCOUNTER
I called and spoke with Naseem and his wife Miya.  I have him scheduled for f/up clinic visit.  Pt is still very sore and per wife he contacted his PCP and was prescribed muscle relaxant.  I will email and mail a letter with appt details.  CAYLA Lee RN, BSN  Interventional Radiology Care Coordinator   Phone:  [phone]

## 2018-02-09 NOTE — TELEPHONE ENCOUNTER
Received Imaging From: [facility]    Image Type (x): Disc:___  Pacs:_x__      Exam Date/Name: XR chest 11/18/14, 9/20/15, 2/9/17, 1/12/18 Comments:

## 2018-02-10 NOTE — PROGRESS NOTES
SUBJECTIVE:  Naseem Ferrer is a 62 year old male who presents for pre-op evaluation. Planning for Liver biopsy.Probably HCC.    Heavy smoker with COPD. Alcohol abuse.Admitted on 1/28/2018 with intra abdominal bleed. Possible rupture of the hepatic mass?HCC. Had embolization and PRBCs. Since then on O2. Patient had hypercapnic respiratory failure.  No DM. Not on any cardiac meds.    No prior cardiac history.    Patient Active Problem List    Diagnosis Date Noted     Liver lesion 01/28/2018     Priority: Medium    .  Current Outpatient Prescriptions   Medication Sig     albuterol (PROAIR HFA/PROVENTIL HFA/VENTOLIN HFA) 108 (90 BASE) MCG/ACT Inhaler Inhale 2 puffs into the lungs every 4 hours as needed for shortness of breath / dyspnea or wheezing     fluticasone (FLONASE) 50 MCG/ACT spray Spray 2 sprays into both nostrils daily     LORAZEPAM PO Take 0.5 mg by mouth every 8 hours as needed for anxiety Prescribed in January 2018- patient has not started taking it yet.     folic acid (FOLVITE) 1 MG tablet Take 1 tablet (1 mg) by mouth daily     multivitamins with minerals (CERTAVITE/CEROVITE) LIQD liquid 15 mLs by Per Feeding Tube route daily     thiamine 100 MG tablet Take 1 tablet (100 mg) by mouth daily     loratadine (CLARITIN) 10 MG tablet Take 1 tablet (10 mg) by mouth daily     ipratropium - albuterol 0.5 mg/2.5 mg/3 mL (DUONEB) 0.5-2.5 (3) MG/3ML neb solution Take 1 vial (3 mLs) by nebulization 4 times daily     fluticasone-salmeterol (ADVAIR) 250-50 MCG/DOSE diskus inhaler Inhale 1 puff into the lungs 2 times daily     QUEtiapine (SEROQUEL) 25 MG tablet Take 1 tablet (25 mg) by mouth 2 times daily     No current facility-administered medications for this visit.      No past medical history on file.  No past surgical history on file.  Allergies   Allergen Reactions     Mold Unknown     Other reaction(s): Other  Inability to breathe     Tramadol Unknown, Other (See Comments), Diarrhea, Rash and Nausea and  "Vomiting     Social History     Social History     Marital status:      Spouse name: N/A     Number of children: N/A     Years of education: N/A     Occupational History     Not on file.     Social History Main Topics     Smoking status: Not on file     Smokeless tobacco: Not on file     Alcohol use Not on file     Drug use: Not on file     Sexual activity: Not on file     Other Topics Concern     Not on file     Social History Narrative     No family history on file.       REVIEW OF SYSTEMS:  General: negative, fever, chills, night sweats  Skin: negative, acne, rash and scaling  Eyes: negative, double vision, eye pain and photophobia  Ears/Nose/Throat: negative, nasal congestion and purulent rhinorrhea  Respiratory: No hemoptysis and negative  Cardiovascular: negative, palpitations, tachycardia, irregular heart beat, chest pain and exertional chest pain or pressure  Gastrointestinal: negative, dysphagia, nausea and vomiting  Genitourinary: negative, nocturia, dysuria and frequency  Musculoskeletal: negative, fracture, back pain, neck pain and arthritis  Neurologic: negative, headaches, syncope, stroke and seizures  Psychiatric: negative, nervous breakdown, thoughts of self-harm and thoughts of hurting someone else  Hematologic/Lymphatic/Immunologic: negative, bleeding disorder, chills and fever  Endocrine: negative, cold intolerance, heat intolerance and hot flashes       OBJECTIVE:  Blood pressure 119/66, pulse 103, height 1.638 m (5' 4.5\"), SpO2 93 %.  General Appearance: alert and moderate distress  Head: Normocephalic. No masses, lesions, tenderness or abnormalities  Eyes: conjuctiva clear, PERRL, EOM intact  Ears: External ears normal. Canals clear. TM's normal.  Nose: Nares normal  Mouth: normal  Neck: Supple, no cervical adenopathy, no thyromegaly  Lungs: clear to auscultation  Cardiac: regular rate and rhythm, normal S1 and S2, no murmur  Abdomen: Soft, nontender.  Normal bowel sounds.  No " hepatosplenomegaly or abnormal masses  Extremities: no peripheral edema, peripheral pulses normal  Musculoskeletal: negative  Neurological: Cranial nerves 2-12 intact, motor strength intact       ASSESSMENT/PLAN:  62 yr old male for pre-op evaluation.  Suspected HCC,rupture,embolizatio to control bleed.  Smoker with severe COPD. On O2 now. Prior alcohol abuse.  EKG reviewed. Sinus tachy. PVCs.  If plan is for a biopsy,no cardiac testing needed. But if HCC and need surgery, will plan for a DSE.  Per orders.   Return to Clinic PRN.    Reviewed stress echo. Normal. Patient may proceed with planned biopsy.

## 2018-02-10 NOTE — MR AVS SNAPSHOT
After Visit Summary   2/10/2018    Naseem Ferrer    MRN: [id number]           Patient Information     Date Of Birth          1955        Visit Information        Provider Department      2/10/2018 10:30 AM SOFIA Hoskins MD [facility]        Today's Diagnoses     Pre-operative cardiovascular examination    -  1    Shortness of breath          Care Instructions    Thank you for your visit today.  Please call me with any questions or concerns.   Armin Devine  RN  Cardiology Care Coordinator  [phone], press option 1 then option 3            Follow-ups after your visit        Follow-up notes from your care team     Return if symptoms worsen or fail to improve.      Your next 10 appointments already scheduled     Feb 12, 2018 12:30 PM CST   FULL PULMONARY FUNCTION with  PFL A   [facility] ([facility])    [address] [place] [id number] [phone]            Feb 12, 2018  1:40 PM CST   (Arrive by 1:25 PM)   XR CHEST 2 VIEWS with UCXR1   [facility] ([facility])    [address] [place] [id number] [phone]           Please bring a list of your current medicines to your exam. (Include vitamins, minerals and over-thecounter medicines.) Leave your valuables at home.  Tell your doctor if there is a chance you may be pregnant.  You do not need to do anything special for this exam.            Feb 12, 2018  3:30 PM CST   (Arrive by 3:15 PM)   New Patient Visit with Nelda Manley MD   [facility] ([facility])    [address] [place] [address]  [address] [place] [id number] [phone]            Feb 15, 2018 10:15 AM CST   (Arrive by 10:00 AM)   New Patient Visit with Chan Connelly MD   [facility] ([facility])    [facility]  [address]  [facility] [address] [phone]            Feb 15, 2018  1:00 PM CST   Ech Dobutamine Stress Test with [id number]   [facility] [place] ([facility])    [address] [facility] [address] [phone]           1.  Please bring or wear a comfortable two-piece outfit and walking shoes. 2.  Stop eating 3 hours before the test. You may drink water or juice. 3.  Stop all caffeine 12 hours before the test. This includes coffee, tea, soda pop, chocolate and certain medicines (such as Anacin and Excederin). Also avoid decaf coffee and tea, as these contain small amounts of caffeine. 4.  No alcohol, smoking or use of other tobacco products for 12 hours before the test. 5.  Refer to your provider instructions to see if you need to stop any medications (such as beta-blockers or nitrates) for this test. 6.  For patients with diabetes: -   If you take insulin, call your diabetes care team. Ask if you should take a   dose the morning of your test. -   If you take diabetes medicine by mouth, don't take it on the morning of your test. Bring it with you to take after the test.  (If you have questions, call your diabetes care team) 7.  When you arrive, please tell us if: -   You have diabetes. -   You have taken Viagra, Cialis or Levitra in the past 48 hours. 8.  For any questions that cannot be answered, please contact the ordering physician            Mar 06, 2018 12:00 PM CST   (Arrive by 11:45 AM)   Return Visit with Mic Renner MD   [facility] Interventional Radiology ([facility])    [address]  1st Floor  [place] [address] [phone]              Future tests that were ordered for you today     Open Future Orders        Priority Expected Expires Ordered    Dobutamine Stress Echocardiogram Routine 2/10/2018 3/10/2018 2/10/2018            Who to contact     If you have questions or need  "follow up information about today's clinic visit or your schedule please contact [facility] directly at [phone].  Normal or non-critical lab and imaging results will be communicated to you by MyChart, letter or phone within 4 business days after the clinic has received the results. If you do not hear from us within 7 days, please contact the clinic through MyChart or phone. If you have a critical or abnormal lab result, we will notify you by phone as soon as possible.  Submit refill requests through [employer] or call your pharmacy and they will forward the refill request to us. Please allow 3 business days for your refill to be completed.          Additional Information About Your Visit        [employer] Information     [employer] lets you send messages to your doctor, view your test results, renew your prescriptions, schedule appointments and more. To sign up, go to [email or web address] . Click on \"Log in\" on the left side of the screen, which will take you to the Welcome page. Then click on \"Sign up Now\" on the right side of the page.     You will be asked to enter the access code listed below, as well as some personal information. Please follow the directions to create your username and password.     Your access code is: [email or web address]  Expires: 2018  2:04 PM     Your access code will  in 90 days. If you need help or a new code, please call your [place] clinic or [phone].        Care EveryWhere ID     This is your Care EveryWhere ID. This could be used by other organizations to access your [place] medical records  [id number]        Your Vitals Were     Pulse Height Pulse Oximetry             103 1.638 m (5' 4.5\") 93%          Blood Pressure from Last 3 Encounters:   02/10/18 119/66   18 114/62    Weight from Last 3 Encounters:   18 66.5 kg (146 lb 9.7 oz)               Primary Care Provider Office Phone # Fax #    Jonna Clements [phone]       [facility] [address] [facility] [address]        Equal Access to Services     THOMPSONHERI MARILU : Hadii aad ku haddino Sostephanieali, waaxda luqadaha, qaybta kaenriqueda faustinoyash, aure gandhi jameytia harmonileana suárez jose bang. So [facility] [phone].    ATENCIÓN: Si habla español, tiene a bhagat disposición servicios gratuitos de asistencia lingüística. Llame al [phone].    We comply with applicable federal civil rights laws and [place] laws. We do not discriminate on the basis of race, color, national origin, age, disability, sex, sexual orientation, or gender identity.            Thank you!     Thank you for choosing [facility]  for your care. Our goal is always to provide you with excellent care. Hearing back from our patients is one way we can continue to improve our services. Please take a few minutes to complete the written survey that you may receive in the mail after your visit with us. Thank you!             Your Updated Medication List - Protect others around you: Learn how to safely use, store and throw away your medicines at www.disposemymeds.org.          This list is accurate as of 2/10/18 10:44 AM.  Always use your most recent med list.                   Brand Name Dispense Instructions for use Diagnosis    albuterol 108 (90 BASE) MCG/ACT Inhaler    PROAIR HFA/PROVENTIL HFA/VENTOLIN HFA     Inhale 2 puffs into the lungs every 4 hours as needed for shortness of breath / dyspnea or wheezing        fluticasone 50 MCG/ACT spray    FLONASE     Spray 2 sprays into both nostrils daily        fluticasone-salmeterol 250-50 MCG/DOSE diskus inhaler    ADVAIR    1 Inhaler    Inhale 1 puff into the lungs 2 times daily    Chronic obstructive pulmonary disease, unspecified COPD type (H)       folic acid 1 MG tablet    FOLVITE    30 tablet    Take 1 tablet (1 mg) by mouth daily    Alcohol abuse       ipratropium - albuterol 0.5 mg/2.5 mg/3 mL 0.5-2.5 (3) MG/3ML neb solution    DUONEB    360 mL    Take 1 vial (3 mLs) by  nebulization 4 times daily    Chronic obstructive pulmonary disease, unspecified COPD type (H)       loratadine 10 MG tablet    CLARITIN    30 tablet    Take 1 tablet (10 mg) by mouth daily    Chronic obstructive pulmonary disease, unspecified COPD type (H), Acute seasonal allergic rhinitis, unspecified trigger       LORAZEPAM PO      Take 0.5 mg by mouth every 8 hours as needed for anxiety Prescribed in January 2018- patient has not started taking it yet.        multivitamins with minerals Liqd liquid      15 mLs by Per Feeding Tube route daily        QUEtiapine 25 MG tablet    SEROquel    60 tablet    Take 1 tablet (25 mg) by mouth 2 times daily    Encephalopathy       thiamine 100 MG tablet     30 tablet    Take 1 tablet (100 mg) by mouth daily    Alcohol abuse

## 2018-02-10 NOTE — NURSING NOTE
Chief Complaint   Patient presents with     New Patient     pre-op cardiac clearance (liver cancer) SOB, related to COPD? - appt per Batsheva Aparicio referring     Vitals were taken and medications were reconciled.  RADHA Spicer  10:18 AM

## 2018-02-10 NOTE — PATIENT INSTRUCTIONS
Thank you for your visit today.  Please call me with any questions or concerns.   Armin Devine RN  Cardiology Care Coordinator  [phone], press option 1 then option 3

## 2018-02-10 NOTE — LETTER
2/10/2018      RE: Naseem Ferrer  [address]  [facility] [address]       Dear Colleague,    Thank you for the opportunity to participate in the care of your patient, Naseem Ferrer, at the [facility]. Please see a copy of my visit note below.       SUBJECTIVE:  Naseem Ferrer is a 62 year old male who presents for pre-op evaluation. Planning for Liver biopsy.Probably HCC.    Heavy smoker with COPD. Alcohol abuse.Admitted on 1/28/2018 with intra abdominal bleed. Possible rupture of the hepatic mass?HCC. Had embolization and PRBCs. Since then on O2. Patient had hypercapnic respiratory failure.  No DM. Not on any cardiac meds.    No prior cardiac history.    Patient Active Problem List    Diagnosis Date Noted     Liver lesion 01/28/2018     Priority: Medium    .  Current Outpatient Prescriptions   Medication Sig     albuterol (PROAIR HFA/PROVENTIL HFA/VENTOLIN HFA) 108 (90 BASE) MCG/ACT Inhaler Inhale 2 puffs into the lungs every 4 hours as needed for shortness of breath / dyspnea or wheezing     fluticasone (FLONASE) 50 MCG/ACT spray Spray 2 sprays into both nostrils daily     LORAZEPAM PO Take 0.5 mg by mouth every 8 hours as needed for anxiety Prescribed in January 2018- patient has not started taking it yet.     folic acid (FOLVITE) 1 MG tablet Take 1 tablet (1 mg) by mouth daily     multivitamins with minerals (CERTAVITE/CEROVITE) LIQD liquid 15 mLs by Per Feeding Tube route daily     thiamine 100 MG tablet Take 1 tablet (100 mg) by mouth daily     loratadine (CLARITIN) 10 MG tablet Take 1 tablet (10 mg) by mouth daily     ipratropium - albuterol 0.5 mg/2.5 mg/3 mL (DUONEB) 0.5-2.5 (3) MG/3ML neb solution Take 1 vial (3 mLs) by nebulization 4 times daily     fluticasone-salmeterol (ADVAIR) 250-50 MCG/DOSE diskus inhaler Inhale 1 puff into the lungs 2 times daily     QUEtiapine (SEROQUEL) 25 MG tablet Take 1 tablet (25 mg) by mouth 2 times  "daily     No current facility-administered medications for this visit.      No past medical history on file.  No past surgical history on file.  Allergies   Allergen Reactions     Mold Unknown     Other reaction(s): Other  Inability to breathe     Tramadol Unknown, Other (See Comments), Diarrhea, Rash and Nausea and Vomiting     Social History     Social History     Marital status:      Spouse name: N/A     Number of children: N/A     Years of education: N/A     Occupational History     Not on file.     Social History Main Topics     Smoking status: Not on file     Smokeless tobacco: Not on file     Alcohol use Not on file     Drug use: Not on file     Sexual activity: Not on file     Other Topics Concern     Not on file     Social History Narrative     No family history on file.       REVIEW OF SYSTEMS:  General: negative, fever, chills, night sweats  Skin: negative, acne, rash and scaling  Eyes: negative, double vision, eye pain and photophobia  Ears/Nose/Throat: negative, nasal congestion and purulent rhinorrhea  Respiratory: No hemoptysis and negative  Cardiovascular: negative, palpitations, tachycardia, irregular heart beat, chest pain and exertional chest pain or pressure  Gastrointestinal: negative, dysphagia, nausea and vomiting  Genitourinary: negative, nocturia, dysuria and frequency  Musculoskeletal: negative, fracture, back pain, neck pain and arthritis  Neurologic: negative, headaches, syncope, stroke and seizures  Psychiatric: negative, nervous breakdown, thoughts of self-harm and thoughts of hurting someone else  Hematologic/Lymphatic/Immunologic: negative, bleeding disorder, chills and fever  Endocrine: negative, cold intolerance, heat intolerance and hot flashes       OBJECTIVE:  Blood pressure 119/66, pulse 103, height 1.638 m (5' 4.5\"), SpO2 93 %.  General Appearance: alert and moderate distress  Head: Normocephalic. No masses, lesions, tenderness or abnormalities  Eyes: conjuctiva clear, " PERRL, EOM intact  Ears: External ears normal. Canals clear. TM's normal.  Nose: Nares normal  Mouth: normal  Neck: Supple, no cervical adenopathy, no thyromegaly  Lungs: clear to auscultation  Cardiac: regular rate and rhythm, normal S1 and S2, no murmur  Abdomen: Soft, nontender.  Normal bowel sounds.  No hepatosplenomegaly or abnormal masses  Extremities: no peripheral edema, peripheral pulses normal  Musculoskeletal: negative  Neurological: Cranial nerves 2-12 intact, motor strength intact       ASSESSMENT/PLAN:  62 yr old male for pre-op evaluation.  Suspected HCC,rupture,embolizatio to control bleed.  Smoker with severe COPD. On O2 now. Prior alcohol abuse.  EKG reviewed. Sinus tachy. PVCs.  If plan is for a biopsy,no cardiac testing needed. But if HCC and need surgery, will plan for a DSE.  Per orders.   Return to Clinic PRN.    Sincerely,     SOFIA Hoskins MD

## 2018-02-12 NOTE — NURSING NOTE
Chief Complaint   Patient presents with     Consult     New consult on Naseem and his COPD     Armin Cleveland CMA at 1:35 PM on 2/12/2018

## 2018-02-12 NOTE — LETTER
2/12/2018       RE: Naseem Ferrer  [address]  [facility] [address]     Dear Colleague,    Thank you for referring your patient, Naseem Ferrer, to the [facility] at [facility]. Please see a copy of my visit note below.    Service Date: 02/12/2018      CHIEF COMPLAINT:  COPD.      REFERRING PROVIDER:  Chan Connelly MD.      HISTORY OF PRESENT ILLNESS:  The patient is a 62-year-old man here for evaluation of COPD.  He had a recent hospitalization, following of which he was discharged to home on oxygen.  He says he was not on oxygen previous to that but had been seeing a primary care provider for COPD for many years.  He has been taking fluticasone/vilanterol as well as albuterol/ipratropium via nebulizer.  These medications have been prescribed by Dr. Clemetns in [place].  He does have a general problem with chronic cough and has been having difficulty with expectoration.  He does not think that he has ever been coughing up blood.  He says that he is a very active person and reports no clear limitation in his exercise tolerance and thinks he can walk at least a block and a half without stopping.  He has a flight of stairs in his home and he is able to climb that one flight without stopping.  Sometimes if he goes back down and then right back up again he might have trouble breathing at the top.  He has never had any hospitalizations for his lungs, but he does say that he has had prednisone and antibiotics for his lungs 3-4 times in the last year and last was about 6 weeks ago.  He does not know of any problems with his breathing at night.  His wife says that he snores but he thinks that mostly his sleep is refreshing.  He does not have problems with headaches in the morning.      PAST MEDICAL HISTORY:   1.  COPD.   2.  Liver lesion concerning for cancer.   3.  Hypertension.   4.  Depression.   5.  Tobacco abuse.      FAMILY HISTORY:   "He says a sister had a cancer, but he does not know what kind.  His father also had a cancer and he also does not know what kind.  His mom had respiratory issues, but she was a heavy smoker.      SOCIAL HISTORY:  Significant for about 50-pack-years worth of smoking but quit 2 weeks ago after cutting down to 4 cigarettes per day for a period of time.  His wife runs an in-home  in their home in [place].  Before helping her with the  for the last 20 years, he worked in construction doing demolition.  He has had a variety of other jobs as well and reports significant environmental exposures with most of his previous employment. He is seen today with a female friend who is his \".\"     REVIEW OF SYSTEMS:  A full 14-point review of systems was done and is negative except as noted above and in the HPI.      PHYSICAL EXAMINATION:   VITAL SIGNS:  Blood pressure 119/66, pulse is 100, respiratory rate 20, O2 is 94% on 6 liters.   GENERAL APPEARANCE:  Appears stated age, no distress.   EYES:  PERRL.  No conjunctival injection.   HEENT:  Nasal mucosa congested with purulent mucus noted.  Significant septal deviation.   MOUTH:  Oral mucosa is moist.  Upper dentures in place.  No posterior oropharyngeal erythema or exudate.   NECK:  Supple with no masses.   LYMPHATICS:  No cervical or supraclavicular lymphadenopathy.   RESPIRATORY:  Poor air movement bilaterally but no wheezes noted.   CARDIOVASCULAR:  Regular rate and rhythm, normal S1, S2, no murmurs, rubs or gallops.  JVP not appreciated with patient sitting upright at 90 degrees and no lower extremity edema is noted.     MUSCULOSKELETAL:  No clubbing or cyanosis.   SKIN:  No rash on limited exam.   NEUROLOGIC:  Mentation appears intact and speech is fluid.   PSYCHIATRIC:  Affect appears appropriate.      RESULTS:  Labs show anemia with hemoglobin of 9.6 and hypoalbuminemia, most recently 2.0.      RESULTS:  Pulmonary function testing shows very severe " COPD and with no significant bronchodilator response, air trapping on lung volumes and reduced DLCO.      IMAGING:  Reviewed directly by me.  Formal impression by Dr. Mccullough.   1.  Mild pulmonary, vascular congestion.   2.  Small left pleural effusion.      ASSESSMENT AND PLAN:  The patient is a 62-year-old man with very severe COPD, here for evaluation.  It appears his evaluation today is simply to establish care with a pulmonary provider after hospitalization complicated by hypercapnic respiratory failure and a brief period of intubation.  He does have very severe COPD.  We discussed at length that there are 2 interventions to prolong life in COPD.  The first is tobacco cessation, which he says he did 6 weeks ago.  The second is supplemental oxygen for those with severe hypoxia at rest.  He is using oxygen at 6 liters per minute.  Likely he needs the oxygen titration to ensure that he is on the appropriate amounts of oxygen; this reassessment can be done by his [employer] company (typically about 1 month after discharge from hospital0.  He does have fluticasone/salmeterol at home as well as albuterol and ipratropium, which he says he uses it faithfully.  I would encourage him to continue to do this.  He would likely benefit from pulmonary rehabilitation referral and I placed this order today.  There is a pulmonary rehab center in [place] and I would strongly suggest that he follow up with them.  He suggested today that he would prefer to try and get his liver mass fully evaluated before establishing with rehab, however, I think in general it would be good for him to do pulmonary rehab regardless of his liver mass. (please note: referral for pulm rehab also placed at d/c from hospital- will need consistent encouragement to follow through with recommendations)  He has liver disease and pulmonary issues as well a family history of pulmonary issues and I think that alpha-1 antitrypsin deficiency testing would be reasonable  for him.  I discussed this with him in detail today and asked that he go to the lab for a blood draw.      We did discuss that he is a candidate for lung cancer screening.  This can be done through his primary care provider.  Lung cancer screening via low dose CT scan for patients between the ages of 55 and 79 with more than 30-pack-year smoking history is recommended on an annual basis. We discussed risks and benefits such as radiation, incidental nodule finding and additional imaging that may be required; benefit is identification of early lung cancer with better treatment potential. He reports that he recently had a CT scan done and can establish ongoing lung cancer screening through his primary care provider at his convenience.      He does have issues with snoring and in the setting of chronic respiratory failure I think that a sleep study would be prudent.  I placed a referral for this today. (Again please note that pt was referred to sleep on d/c from hospital- will need encouragement to follow through with recommendations).      With regard to presurgical clearance, it is not clear to me from documentation whether pre-clearance for surgery from a pulmonologist is requested or not.  He understands that liver biopsy is on the table but he is not quite sure whether that would be a surgical or a CT-guided biopsy.  Surgical biopsy could be considered.  He is at high risk for respiratory failure.  Depending on the risk calculator that you use, his risk falls somewhere between 12 and 42% of a risk of postoperative respiratory failure (the low end is the Gross risk calculator is 12%, in the middle is the [place] at 26%, and highest is ARISCAT at 42%, although if surgical intervention would be less than 2 hours the risk of postoperative respiratory failure would fall to 13% using the ARISCAT calculator).  I did  him that if he does undergo surgical intervention that prior to surgery careful and consistent  use of his inhalers as well as cough and deep breathe techniques is important.  Following interventions, early mobilization, incentive spirometry and flutter valve use is exceedingly important as well.      Previous exacerbations of COPD, noting that he has between 3 and 4 exacerbations of COPD.  One could consider chronic daily azithromycin for exacerbation prevention.  This was not discussed with him at this visit.  I would be happy to see him again in consultation, but it might also be appropriate for him to see a pulmonary provider closer to home.  Please feel free to contact me with any additional questions.         LORY LLOYD MD             D: 2018   T: 2018   MT: DAVID      Name:     CARA FARFAN   MRN:      [id number]        Account:      [id number]   :      1955           Service Date: 2018      Document: [id number]

## 2018-02-12 NOTE — MR AVS SNAPSHOT
"              After Visit Summary   2/12/2018    Naseem Ferrer    MRN: [id number]           Patient Information     Date Of Birth          1955        Visit Information        Provider Department      2/12/2018 3:30 PM Nelda Manley MD [facility]        Today's Diagnoses     Shortness of breath        Chronic obstructive pulmonary disease, unspecified COPD type (H)           Follow-ups after your visit        Additional Services     PULMONARY REHAB REFERRAL       *This therapy referral will be filtered to a centralized scheduling office at [facility] and the patient will receive a call to schedule an appointment at a [place] location most convenient for them.*     If you have not heard from the scheduling office within 2 business days, please call [phone] for all locations.    Please be aware that coverage of these services is subject to the terms and limitations of your health insurance plan.  Call member services at your health plan with any benefit or coverage questions.      **Note to Provider:  If you are referring outside of [place] for the therapy appointment, please list the name of the location in the \"special instructions\" above, print the referral and give to the patient to schedule the appointment.     LIVES IN [facility]- should be scheduled THERE            SLEEP EVALUATION & MANAGEMENT REFERRAL - ADULT -[facility] [phone] (Age 13 if over 100 lbs)       Please be aware that coverage of these services is subject to the terms and limitations of your health insurance plan.  Call member services at your health plan with any benefit or coverage questions.      Please bring the following to your appointment:    >>   List of current medications   >>   This referral request   >>   Any documents/labs given to you for this referral                      Your next 10 appointments already scheduled     " Feb 15, 2018 10:15 AM CST   (Arrive by 10:00 AM)   New Patient Visit with Chan Connelly MD   [facility] ([email or web address] [place])    [address]  [address]  [facility] [id number] [phone]            Feb 15, 2018  1:00 PM CST   Ech Dobutamine Stress Test with [id number]   [facility] [place],  [facility] ([facility])    [address] [id number] [phone]           1.  Please bring or wear a comfortable two-piece outfit and walking shoes. 2.  Stop eating 3 hours before the test. You may drink water or juice. 3.  Stop all caffeine 12 hours before the test. This includes coffee, tea, soda pop, chocolate and certain medicines (such as Anacin and Excederin). Also avoid decaf coffee and tea, as these contain small amounts of caffeine. 4.  No alcohol, smoking or use of other tobacco products for 12 hours before the test. 5.  Refer to your provider instructions to see if you need to stop any medications (such as beta-blockers or nitrates) for this test. 6.  For patients with diabetes: -   If you take insulin, call your diabetes care team. Ask if you should take a   dose the morning of your test. -   If you take diabetes medicine by mouth, don't take it on the morning of your test. Bring it with you to take after the test.  (If you have questions, call your diabetes care team) 7.  When you arrive, please tell us if: -   You have diabetes. -   You have taken Viagra, Cialis or Levitra in the past 48 hours. 8.  For any questions that cannot be answered, please contact the ordering physician            Mar 06, 2018 12:00 PM CST   (Arrive by 11:45 AM)   Return Visit with Mic Renner MD   [facility] ([email or web address] [place])    [address]  1st Floor  [facility] [id number] [phone]              Who to contact     If you have questions or need follow up information  "about today's clinic visit or your schedule please contact [facility] directly at [phone].  Normal or non-critical lab and imaging results will be communicated to you by Mohivehart, letter or phone within 4 business days after the clinic has received the results. If you do not hear from us within 7 days, please contact the clinic through Mohivehart or phone. If you have a critical or abnormal lab result, we will notify you by phone as soon as possible.  Submit refill requests through ticketstreet or call your pharmacy and they will forward the refill request to us. Please allow 3 business days for your refill to be completed.          Additional Information About Your Visit        MohiveharIppies Information     ticketstreet lets you send messages to your doctor, view your test results, renew your prescriptions, schedule appointments and more. To sign up, go to [email or web address] . Click on \"Log in\" on the left side of the screen, which will take you to the Welcome page. Then click on \"Sign up Now\" on the right side of the page.     You will be asked to enter the access code listed below, as well as some personal information. Please follow the directions to create your username and password.     Your access code is: [email or web address]  Expires: 2018  2:04 PM     Your access code will  in 90 days. If you need help or a new code, please call your [place] clinic or [phone].        Care EveryWhere ID     This is your Care EveryWhere ID. This could be used by other organizations to access your [place] medical records  [id number]         Blood Pressure from Last 3 Encounters:   02/10/18 119/66   18 114/62    Weight from Last 3 Encounters:   18 66.5 kg (146 lb 9.7 oz)              We Performed the Following     Alpha 1 antitryp mona reflex to pheno        Primary Care Provider Office Phone # Fax #    Jonna Clements [phone]       [facility] [address] " [facility] [address]        Equal Access to Services     GETACHEW MICHEL : Hadii norma weems juliannacharley Olmanali, wasilviada lualexandracarisaha, qaybta giuliaenriquepaolo acevedo, aure cortesalvinjeanmarie bang. So [facility] [phone].    ATENCIÓN: Si habla español, tiene a bhagat disposición servicios gratuitos de asistencia lingüística. Llame al [phone].    We comply with applicable federal civil rights laws and [place] laws. We do not discriminate on the basis of race, color, national origin, age, disability, sex, sexual orientation, or gender identity.            Thank you!     Thank you for choosing [facility]  for your care. Our goal is always to provide you with excellent care. Hearing back from our patients is one way we can continue to improve our services. Please take a few minutes to complete the written survey that you may receive in the mail after your visit with us. Thank you!             Your Updated Medication List - Protect others around you: Learn how to safely use, store and throw away your medicines at www.disposemymeds.org.          This list is accurate as of 2/12/18 11:59 PM.  Always use your most recent med list.                   Brand Name Dispense Instructions for use Diagnosis    albuterol 108 (90 BASE) MCG/ACT Inhaler    PROAIR HFA/PROVENTIL HFA/VENTOLIN HFA     Inhale 2 puffs into the lungs every 4 hours as needed for shortness of breath / dyspnea or wheezing        fluticasone 50 MCG/ACT spray    FLONASE     Spray 2 sprays into both nostrils daily        fluticasone-salmeterol 250-50 MCG/DOSE diskus inhaler    ADVAIR    1 Inhaler    Inhale 1 puff into the lungs 2 times daily    Chronic obstructive pulmonary disease, unspecified COPD type (H)       folic acid 1 MG tablet    FOLVITE    30 tablet    Take 1 tablet (1 mg) by mouth daily    Alcohol abuse       ipratropium - albuterol 0.5 mg/2.5 mg/3 mL 0.5-2.5 (3) MG/3ML neb solution    DUONEB    360 mL    Take 1 vial (3  mLs) by nebulization 4 times daily    Chronic obstructive pulmonary disease, unspecified COPD type (H)       loratadine 10 MG tablet    CLARITIN    30 tablet    Take 1 tablet (10 mg) by mouth daily    Chronic obstructive pulmonary disease, unspecified COPD type (H), Acute seasonal allergic rhinitis, unspecified trigger       LORAZEPAM PO      Take 0.5 mg by mouth every 8 hours as needed for anxiety Prescribed in January 2018- patient has not started taking it yet.        multivitamins with minerals Liqd liquid      15 mLs by Per Feeding Tube route daily        QUEtiapine 25 MG tablet    SEROquel    60 tablet    Take 1 tablet (25 mg) by mouth 2 times daily    Encephalopathy       thiamine 100 MG tablet     30 tablet    Take 1 tablet (100 mg) by mouth daily    Alcohol abuse

## 2018-02-13 NOTE — PROGRESS NOTES
Service Date: 02/12/2018      CHIEF COMPLAINT:  COPD.      REFERRING PROVIDER:  Chan Connelly MD.      HISTORY OF PRESENT ILLNESS:  The patient is a 62-year-old man here for evaluation of COPD.  He had a recent hospitalization, following of which he was discharged to home on oxygen.  He says he was not on oxygen previous to that but had been seeing a primary care provider for COPD for many years.  He has been taking fluticasone/vilanterol as well as albuterol/ipratropium via nebulizer.  These medications have been prescribed by Dr. Clements in [place].  He does have a general problem with chronic cough and has been having difficulty with expectoration.  He does not think that he has ever been coughing up blood.  He says that he is a very active person and reports no clear limitation in his exercise tolerance and thinks he can walk at least a block and a half without stopping.  He has a flight of stairs in his home and he is able to climb that one flight without stopping.  Sometimes if he goes back down and then right back up again he might have trouble breathing at the top.  He has never had any hospitalizations for his lungs, but he does say that he has had prednisone and antibiotics for his lungs 3-4 times in the last year and last was about 6 weeks ago.  He does not know of any problems with his breathing at night.  His wife says that he snores but he thinks that mostly his sleep is refreshing.  He does not have problems with headaches in the morning.      PAST MEDICAL HISTORY:   1.  COPD.   2.  Liver lesion concerning for cancer.   3.  Hypertension.   4.  Depression.   5.  Tobacco abuse.      FAMILY HISTORY:  He says a sister had a cancer, but he does not know what kind.  His father also had a cancer and he also does not know what kind.  His mom had respiratory issues, but she was a heavy smoker.      SOCIAL HISTORY:  Significant for about 50-pack-years worth of smoking but quit 2 weeks ago after cutting down to  "4 cigarettes per day for a period of time.  His wife runs an in-home  in their home in [place].  Before helping her with the  for the last 20 years, he worked in construction doing demolition.  He has had a variety of other jobs as well and reports significant environmental exposures with most of his previous employment. He is seen today with a female friend who is his \".\"     REVIEW OF SYSTEMS:  A full 14-point review of systems was done and is negative except as noted above and in the HPI.      PHYSICAL EXAMINATION:   VITAL SIGNS:  Blood pressure 119/66, pulse is 100, respiratory rate 20, O2 is 94% on 6 liters.   GENERAL APPEARANCE:  Appears stated age, no distress.   EYES:  PERRL.  No conjunctival injection.   HEENT:  Nasal mucosa congested with purulent mucus noted.  Significant septal deviation.   MOUTH:  Oral mucosa is moist.  Upper dentures in place.  No posterior oropharyngeal erythema or exudate.   NECK:  Supple with no masses.   LYMPHATICS:  No cervical or supraclavicular lymphadenopathy.   RESPIRATORY:  Poor air movement bilaterally but no wheezes noted.   CARDIOVASCULAR:  Regular rate and rhythm, normal S1, S2, no murmurs, rubs or gallops.  JVP not appreciated with patient sitting upright at 90 degrees and no lower extremity edema is noted.     MUSCULOSKELETAL:  No clubbing or cyanosis.   SKIN:  No rash on limited exam.   NEUROLOGIC:  Mentation appears intact and speech is fluid.   PSYCHIATRIC:  Affect appears appropriate.      RESULTS:  Labs show anemia with hemoglobin of 9.6 and hypoalbuminemia, most recently 2.0.      RESULTS:  Pulmonary function testing shows very severe COPD and with no significant bronchodilator response, air trapping on lung volumes and reduced DLCO.      IMAGING:  Reviewed directly by me.  Formal impression by Dr. Mccullough.   1.  Mild pulmonary, vascular congestion.   2.  Small left pleural effusion.      ASSESSMENT AND PLAN:  The patient is a 62-year-old man " with very severe COPD, here for evaluation.  It appears his evaluation today is simply to establish care with a pulmonary provider after hospitalization complicated by hypercapnic respiratory failure and a brief period of intubation.  He does have very severe COPD.  We discussed at length that there are 2 interventions to prolong life in COPD.  The first is tobacco cessation, which he says he did 6 weeks ago.  The second is supplemental oxygen for those with severe hypoxia at rest.  He is using oxygen at 6 liters per minute.  Likely he needs the oxygen titration to ensure that he is on the appropriate amounts of oxygen; this reassessment can be done by his [employer] (typically about 1 month after discharge from hospital0.  He does have fluticasone/salmeterol at home as well as albuterol and ipratropium, which he says he uses it faithfully.  I would encourage him to continue to do this.  He would likely benefit from pulmonary rehabilitation referral and I placed this order today.  There is a pulmonary rehab center in [place] and I would strongly suggest that he follow up with them.  He suggested today that he would prefer to try and get his liver mass fully evaluated before establishing with rehab, however, I think in general it would be good for him to do pulmonary rehab regardless of his liver mass. (please note: referral for pulm rehab also placed at d/c from hospital- will need consistent encouragement to follow through with recommendations)  He has liver disease and pulmonary issues as well a family history of pulmonary issues and I think that alpha-1 antitrypsin deficiency testing would be reasonable for him.  I discussed this with him in detail today and asked that he go to the lab for a blood draw.      We did discuss that he is a candidate for lung cancer screening.  This can be done through his primary care provider.  Lung cancer screening via low dose CT scan for patients between the ages of 55 and 79  with more than 30-pack-year smoking history is recommended on an annual basis. We discussed risks and benefits such as radiation, incidental nodule finding and additional imaging that may be required; benefit is identification of early lung cancer with better treatment potential. He reports that he recently had a CT scan done and can establish ongoing lung cancer screening through his primary care provider at his convenience.      He does have issues with snoring and in the setting of chronic respiratory failure I think that a sleep study would be prudent.  I placed a referral for this today. (Again please note that pt was referred to sleep on d/c from hospital- will need encouragement to follow through with recommendations).      With regard to presurgical clearance, it is not clear to me from documentation whether pre-clearance for surgery from a pulmonologist is requested or not.  He understands that liver biopsy is on the table but he is not quite sure whether that would be a surgical or a CT-guided biopsy.  Surgical biopsy could be considered.  He is at high risk for respiratory failure.  Depending on the risk calculator that you use, his risk falls somewhere between 12 and 42% of a risk of postoperative respiratory failure (the low end is the Gross risk calculator is 12%, in the middle is the [place] at 26%, and highest is ARISCAT at 42%, although if surgical intervention would be less than 2 hours the risk of postoperative respiratory failure would fall to 13% using the ARISCAT calculator).  I did  him that if he does undergo surgical intervention that prior to surgery careful and consistent use of his inhalers as well as cough and deep breathe techniques is important.  Following interventions, early mobilization, incentive spirometry and flutter valve use is exceedingly important as well.      Previous exacerbations of COPD, noting that he has between 3 and 4 exacerbations of COPD.  One could  consider chronic daily azithromycin for exacerbation prevention.  This was not discussed with him at this visit.  I would be happy to see him again in consultation, but it might also be appropriate for him to see a pulmonary provider closer to home.  Please feel free to contact me with any additional questions.         LORY LLOYD MD             D: 2018   T: 2018   MT: LG      Name:     CARA FARFAN   MRN:      [id number]        Account:      [id number]   :      1955           Service Date: 2018      Document: [id number]

## 2018-02-15 NOTE — LETTER
"2/15/2018      RE: Naseem Ferrer  [address] [place] [address]       [facility] - Surgical Oncology    NEW CONSULTATION  Feb 15, 2018    Naseem Ferrer is a 62 year old male who presents with a large right hepatic mass.  He was referred by Dr Rangel.    HISTORY OF PRESENT ILLNESS:  He began having right upper quadrant abdominal pain a few days prior to presentation to an outside hospital for the same complaint on January 28, 2018.  He has had a chest CT and abdominal MR about 1 week prior which revealed a large right liver mass.  He presented to the hospital on January 28, 2018 there was laboratory and radiographic evidence of tumor rupture with intraperitoneal hemorrhage.  Thus, he was transferred to the [facility].  I was consulted on the patient the following morning.  He went to interventional radiology that day to embolize the tumor.  He received 2 or 3 units of blood while in the hospital but there was no evidence of further bleeding after his embolization procedure.  Of note, the patient had significant COPD remained on the ventilator for a period of time but was eventually extubated and discharged home.  Of note, he was discharged home on home oxygen.  Prior to this hospitalization he had been on inhalers for several years for his COPD but it never been on home oxygen.    Since his discharge, the patient has been seen by cardiology and pulmonology.  He reports that he is remained on 6 L of oxygen while at home and his sats have been in the mid 90s during his doctor's visits.  He says he has not been walking up and down the stairs in his house as frequently as before.  Prior to his hospitalization he reported that he would carry the laundry up and down a flight of stairs without much difficulty.  He reports that he does not have any abdominal pain but he does still feel \"distended.\"  He is having normal bowel movements and eating a regular " diet.    Past Medical History:   Diagnosis Date     COPD (chronic obstructive pulmonary disease) (H)     very severe. O2 dependent, h/o intubation. frequent exacerbations.     Depression      Hypertension      Liver lesion     liver mass concerning for HCC, dx not confirmed; adm with hemorrhage from lesion s/p embolization     PAST SURGICAL HISTORY:  No past surgical history on file.  Arthroscopic shoulder surgery.    Current Outpatient Prescriptions   Medication Sig Dispense Refill     albuterol (PROAIR HFA/PROVENTIL HFA/VENTOLIN HFA) 108 (90 BASE) MCG/ACT Inhaler Inhale 2 puffs into the lungs every 4 hours as needed for shortness of breath / dyspnea or wheezing       fluticasone (FLONASE) 50 MCG/ACT spray Spray 2 sprays into both nostrils daily       folic acid (FOLVITE) 1 MG tablet Take 1 tablet (1 mg) by mouth daily 30 tablet 0     multivitamins with minerals (CERTAVITE/CEROVITE) LIQD liquid 15 mLs by Per Feeding Tube route daily  0     thiamine 100 MG tablet Take 1 tablet (100 mg) by mouth daily 30 tablet 0     QUEtiapine (SEROQUEL) 25 MG tablet Take 1 tablet (25 mg) by mouth 2 times daily 60 tablet 0     ipratropium - albuterol 0.5 mg/2.5 mg/3 mL (DUONEB) 0.5-2.5 (3) MG/3ML neb solution Take 1 vial (3 mLs) by nebulization 4 times daily 360 mL 0     fluticasone-salmeterol (ADVAIR) 250-50 MCG/DOSE diskus inhaler Inhale 1 puff into the lungs 2 times daily 1 Inhaler 0     LORAZEPAM PO Take 0.5 mg by mouth every 8 hours as needed for anxiety Prescribed in January 2018- patient has not started taking it yet.       loratadine (CLARITIN) 10 MG tablet Take 1 tablet (10 mg) by mouth daily (Patient not taking: Reported on 2/15/2018) 30 tablet 0           Allergies   Allergen Reactions     Mold Unknown     Other reaction(s): Other  Inability to breathe     Tramadol Unknown, Other (See Comments), Diarrhea, Rash and Nausea and Vomiting        SOCIAL HISTORY:   reports that he quit smoking about 2 weeks ago. He has a 50.00  "pack-year smoking history. He has never used smokeless tobacco.    FAMILY HISTORY:  Family History   Problem Relation Age of Onset     LUNG DISEASE Mother      CANCER Father      CANCER Sister        ROS  A full 14-point review of systems was performed, and the pertinent positives and negatives were mentioned in the history of present illness.    /82  Pulse 95  Temp 98.5  F (36.9  C) (Oral)  Resp 20  Ht 1.6 m (5' 3\")  Wt 66.7 kg (147 lb)  SpO2 95%  BMI 26.04 kg/m2   Physical Exam  General: He is sitting in a wheelchair and on 6 L of oxygen.  He does not appear dyspneic at this time.  Head: Normocephalic, anicteric sclera.  Neck: No cervical or supraclavicular adenopathy.  Pulmonary: Bilateral chest rise, on oxygen.  Cardiac: regular rhythm with heart rate in the 90s.  Abdomen: No abdominal scars, moderately distended, not tympanitic to percussion, no tenderness, no definite mass appreciated.  Extremities: No lower extremity edema.    INVESTIGATIONS:  Labs: Pending from today.  Previously, his liver function tests were normal, platelet count has been 250s, CEA 2.8, AFP 2.8, CA-19-9 18.  Hepatitis C serology was positive.    CT/MRI (January 2018): There is a 13 cm mass within the anterior right anterior hepatic lobe, without any evidence of additional lesions. On the CT scan there was hematoma surrounding the liver and right upper quadrant as well as hematoma in the lower abdomen and pelvis.  There was no evidence of cirrhosis.  Did not appreciate any evidence of metastatic disease.    Biopsy: None    ASSESSMENT/PLAN:  Naseem Ferrer is a 62 year old male with large right hepatic mass (possibly hepatocellular carcinoma, fibrolamellar subtype, or angiosarcoma, etc.) and severe COPD.    The natural history of hepatocellular carcinoma were discussed with the patient and his family friend.  I explained that we do not have a definitive diagnosis at this time but that I believe this is either a fibrolamellar " hepatocellular carcinoma or a traditional hepatocellular carcinoma arising in the setting of hepatitis C. I explained to him that I spent a significant amount of time discussing his COPD with Dr. Manley.  She feels that he has very severe COPD (FEV1 0.5)  and is at very high risk of respiratory failure postoperatively which be would be characterized by repeat intubation and/or the development of pneumonia.  I also explained that surgical removal of this tumor would require a formal right hepatectomy which is a very large surgery.  Essentially everyone who undergoes right hepatectomy develops a reactive right pleural effusion.  This would make his risk of respiratory failure postoperative even higher.  At the conclusion of my discussion with Dr. Manley we determined that his rate of postoperative respiratory failure is likely greater than 50%.  I told the patient that I do not recommend a right hepatectomy given his high risk of postoperative respiratory failure.  We will discuss his case tomorrow at liver multidisciplinary care conference.  I think the most appropriate way to proceed at this point is with a biopsy of this mass which will help define his potential treatment options.  I explained to the patient that he needs to attend his pulmonary rehab appointments.  If at some point in the future his pulmonary function has improved and may reconsider possible right hepatectomy.    Total time spent with the patient was 60 minutes, of which more than half was counseling.     Chan Connelly MD    Division of Surgical Oncology  [facility]

## 2018-02-15 NOTE — NURSING NOTE
"Oncology Rooming Note    February 15, 2018 10:05 AM   Naseem Ferrer is a 62 year old male who presents for:    Chief Complaint   Patient presents with     Oncology Clinic Visit     Consult for Surgery , Liver Lesions      Initial Vitals: /82  Pulse 95  Temp 98.5  F (36.9  C) (Oral)  Resp 20  Ht 1.6 m (5' 3\")  Wt 66.7 kg (147 lb)  SpO2 95%  BMI 26.04 kg/m2 Estimated body mass index is 26.04 kg/(m^2) as calculated from the following:    Height as of this encounter: 1.6 m (5' 3\").    Weight as of this encounter: 66.7 kg (147 lb). Body surface area is 1.72 meters squared.  No Pain (0) Comment: Data Unavailable   No LMP for male patient.  Allergies reviewed: Yes  Medications reviewed: Yes    Medications: Medication refills not needed today.  Pharmacy name entered into [facility]: [facility] [address] [place] [address] [address] [place] [address]    Clinical concerns: surgery  [place] was notified.    8 minutes for nursing intake (face to face time)     Lizzie Chase MA              "

## 2018-02-15 NOTE — PATIENT INSTRUCTIONS
Care Coordination Summary    This is a brief summary of your discussion and care plan from today's visit below.  Please review this information with your primary care provider.  ______________________________________________________________________    As discussed today by Dr. Connelly, we will plan the followin.  You will need to have CT scan done today here at 4 PM in the [facility].  Please do not have anything to eat or drink for 3 hours prior to scan.     2.  You will also have labs done today and you can check in on the first floor for these in the same place that you will be having CT scan.      _______________________________________________________________________    It was a pleasure seeing you in clinic today - please be in touch if there are any further questions about upcoming appointments that arise following today's visit.  During business hours, you may reach the Clinic Coordinator at [phone].  For urgent/emergent questions after business hours, you may reach the on-call Surgical oncology fellow on call by contacting the [facility]  at [phone].    Any benign/non-urgent test results are usually communicated via letter or [employer] message within 1-2 weeks after completion.  Urgent results (those that require a change in the previously-discussed care plan) are usually communicated via a phone call once available from our clinic staff to discuss the results and the next steps in your evaluation.    I recommend signing up for [employer] access if you have not already done so and are comfortable with using a computer.  This allows for online access to your lab results and also helps you communicate efficiently with the clinic should any questions arise in your care.    My role as your RN care coordinator is to assist you with any additional questions or concerns that you may have regarding your diagnosis and plan of care.  Feel free to contact me should you need any further  assistance.  I am happy to be part of your care team here at the [facility].      Sincerely,      Minerva CERONN, HNBC, STAR-T  RN Care Coordinator  Ph: [phone]  FAX: [phone]

## 2018-02-15 NOTE — PROGRESS NOTES
"[facility] - Surgical Oncology    NEW CONSULTATION  Feb 15, 2018    Naseem Ferrer is a 62 year old male who presents with a large right hepatic mass.  He was referred by Dr Rangel.    HISTORY OF PRESENT ILLNESS:  He began having right upper quadrant abdominal pain a few days prior to presentation to an outside hospital for the same complaint on January 28, 2018.  He has had a chest CT and abdominal MR about 1 week prior which revealed a large right liver mass.  He presented to the hospital on January 28, 2018 there was laboratory and radiographic evidence of tumor rupture with intraperitoneal hemorrhage.  Thus, he was transferred to the [facility].  I was consulted on the patient the following morning.  He went to interventional radiology that day to embolize the tumor.  He received 2 or 3 units of blood while in the hospital but there was no evidence of further bleeding after his embolization procedure.  Of note, the patient had significant COPD remained on the ventilator for a period of time but was eventually extubated and discharged home.  Of note, he was discharged home on home oxygen.  Prior to this hospitalization he had been on inhalers for several years for his COPD but it never been on home oxygen.    Since his discharge, the patient has been seen by cardiology and pulmonology.  He reports that he is remained on 6 L of oxygen while at home and his sats have been in the mid 90s during his doctor's visits.  He says he has not been walking up and down the stairs in his house as frequently as before.  Prior to his hospitalization he reported that he would carry the laundry up and down a flight of stairs without much difficulty.  He reports that he does not have any abdominal pain but he does still feel \"distended.\"  He is having normal bowel movements and eating a regular diet.    Past Medical History:   Diagnosis Date     COPD (chronic obstructive pulmonary " disease) (H)     very severe. O2 dependent, h/o intubation. frequent exacerbations.     Depression      Hypertension      Liver lesion     liver mass concerning for HCC, dx not confirmed; adm with hemorrhage from lesion s/p embolization     PAST SURGICAL HISTORY:  No past surgical history on file.  Arthroscopic shoulder surgery.    Current Outpatient Prescriptions   Medication Sig Dispense Refill     albuterol (PROAIR HFA/PROVENTIL HFA/VENTOLIN HFA) 108 (90 BASE) MCG/ACT Inhaler Inhale 2 puffs into the lungs every 4 hours as needed for shortness of breath / dyspnea or wheezing       fluticasone (FLONASE) 50 MCG/ACT spray Spray 2 sprays into both nostrils daily       folic acid (FOLVITE) 1 MG tablet Take 1 tablet (1 mg) by mouth daily 30 tablet 0     multivitamins with minerals (CERTAVITE/CEROVITE) LIQD liquid 15 mLs by Per Feeding Tube route daily  0     thiamine 100 MG tablet Take 1 tablet (100 mg) by mouth daily 30 tablet 0     QUEtiapine (SEROQUEL) 25 MG tablet Take 1 tablet (25 mg) by mouth 2 times daily 60 tablet 0     ipratropium - albuterol 0.5 mg/2.5 mg/3 mL (DUONEB) 0.5-2.5 (3) MG/3ML neb solution Take 1 vial (3 mLs) by nebulization 4 times daily 360 mL 0     fluticasone-salmeterol (ADVAIR) 250-50 MCG/DOSE diskus inhaler Inhale 1 puff into the lungs 2 times daily 1 Inhaler 0     LORAZEPAM PO Take 0.5 mg by mouth every 8 hours as needed for anxiety Prescribed in January 2018- patient has not started taking it yet.       loratadine (CLARITIN) 10 MG tablet Take 1 tablet (10 mg) by mouth daily (Patient not taking: Reported on 2/15/2018) 30 tablet 0           Allergies   Allergen Reactions     Mold Unknown     Other reaction(s): Other  Inability to breathe     Tramadol Unknown, Other (See Comments), Diarrhea, Rash and Nausea and Vomiting        SOCIAL HISTORY:   reports that he quit smoking about 2 weeks ago. He has a 50.00 pack-year smoking history. He has never used smokeless tobacco.    FAMILY  "HISTORY:  Family History   Problem Relation Age of Onset     LUNG DISEASE Mother      CANCER Father      CANCER Sister        ROS  A full 14-point review of systems was performed, and the pertinent positives and negatives were mentioned in the history of present illness.    /82  Pulse 95  Temp 98.5  F (36.9  C) (Oral)  Resp 20  Ht 1.6 m (5' 3\")  Wt 66.7 kg (147 lb)  SpO2 95%  BMI 26.04 kg/m2   Physical Exam  General: He is sitting in a wheelchair and on 6 L of oxygen.  He does not appear dyspneic at this time.  Head: Normocephalic, anicteric sclera.  Neck: No cervical or supraclavicular adenopathy.  Pulmonary: Bilateral chest rise, on oxygen.  Cardiac: regular rhythm with heart rate in the 90s.  Abdomen: No abdominal scars, moderately distended, not tympanitic to percussion, no tenderness, no definite mass appreciated.  Extremities: No lower extremity edema.    INVESTIGATIONS:  Labs: Pending from today.  Previously, his liver function tests were normal, platelet count has been 250s, CEA 2.8, AFP 2.8, CA-19-9 18.  Hepatitis C serology was positive.    CT/MRI (January 2018): There is a 13 cm mass within the anterior right anterior hepatic lobe, without any evidence of additional lesions. On the CT scan there was hematoma surrounding the liver and right upper quadrant as well as hematoma in the lower abdomen and pelvis.  There was no evidence of cirrhosis.  Did not appreciate any evidence of metastatic disease.    Biopsy: None    ASSESSMENT/PLAN:  Naseem Ferrer is a 62 year old male with large right hepatic mass (possibly hepatocellular carcinoma, fibrolamellar subtype, or angiosarcoma, etc.) and severe COPD.    The natural history of hepatocellular carcinoma were discussed with the patient and his family friend.  I explained that we do not have a definitive diagnosis at this time but that I believe this is either a fibrolamellar hepatocellular carcinoma or a traditional hepatocellular carcinoma arising " in the setting of hepatitis C. I explained to him that I spent a significant amount of time discussing his COPD with Dr. Manley.  She feels that he has very severe COPD (FEV1 0.5)  and is at very high risk of respiratory failure postoperatively which be would be characterized by repeat intubation and/or the development of pneumonia.  I also explained that surgical removal of this tumor would require a formal right hepatectomy which is a very large surgery.  Essentially everyone who undergoes right hepatectomy develops a reactive right pleural effusion.  This would make his risk of respiratory failure postoperative even higher.  At the conclusion of my discussion with Dr. Manley we determined that his rate of postoperative respiratory failure is likely greater than 50%.  I told the patient that I do not recommend a right hepatectomy given his high risk of postoperative respiratory failure.  We will discuss his case tomorrow at liver multidisciplinary care conference.  I think the most appropriate way to proceed at this point is with a biopsy of this mass which will help define his potential treatment options.  I explained to the patient that he needs to attend his pulmonary rehab appointments.  If at some point in the future his pulmonary function has improved and may reconsider possible right hepatectomy.    Total time spent with the patient was 60 minutes, of which more than half was counseling.     Chan Connelly MD    Division of Surgical Oncology  [facility]

## 2018-02-15 NOTE — MR AVS SNAPSHOT
After Visit Summary   2/15/2018    Naseem Ferrer    MRN: [id number]           Patient Information     Date Of Birth          1955        Visit Information        Provider Department      2/15/2018 10:15 AM Chan Connelly MD [facility]        Care Instructions    Care Coordination Summary    This is a brief summary of your discussion and care plan from today's visit below.  Please review this information with your primary care provider.  ______________________________________________________________________    As discussed today by Dr. Connelly, we will plan the followin.  You will need to have CT scan done today here at 4 PM in the [facility].  Please do not have anything to eat or drink for 3 hours prior to scan.     2.  You will also have labs done today and you can check in on the first floor for these in the same place that you will be having CT scan.      _______________________________________________________________________    It was a pleasure seeing you in clinic today - please be in touch if there are any further questions about upcoming appointments that arise following today's visit.  During business hours, you may reach the Clinic Coordinator at [phone].  For urgent/emergent questions after business hours, you may reach the on-call Surgical oncology fellow on call by contacting the [facility]  at [phone].    Any benign/non-urgent test results are usually communicated via letter or [employer] message within 1-2 weeks after completion.  Urgent results (those that require a change in the previously-discussed care plan) are usually communicated via a phone call once available from our clinic staff to discuss the results and the next steps in your evaluation.    I recommend signing up for [employer] access if you have not already done so and are comfortable with using a computer.  This allows for online access to your lab results and also  helps you communicate efficiently with the clinic should any questions arise in your care.    My role as your RN care coordinator is to assist you with any additional questions or concerns that you may have regarding your diagnosis and plan of care.  Feel free to contact me should you need any further assistance.  I am happy to be part of your care team here at the [facility].      Sincerely,      Minerva Espinosa  BSN, HNBC, STAR-T  RN Care Coordinator  Ph: [phone]  FAX: [phone]              Follow-ups after your visit        Your next 10 appointments already scheduled     Feb 15, 2018  1:00 PM CST   Ech Dobutamine Stress Test with [id number]   [facility], [place],  Echocardiography ([facility], [facility])    [address]  [facility] [address] [phone]           1.  Please bring or wear a comfortable two-piece outfit and walking shoes. 2.  Stop eating 3 hours before the test. You may drink water or juice. 3.  Stop all caffeine 12 hours before the test. This includes coffee, tea, soda pop, chocolate and certain medicines (such as Anacin and Excederin). Also avoid decaf coffee and tea, as these contain small amounts of caffeine. 4.  No alcohol, smoking or use of other tobacco products for 12 hours before the test. 5.  Refer to your provider instructions to see if you need to stop any medications (such as beta-blockers or nitrates) for this test. 6.  For patients with diabetes: -   If you take insulin, call your diabetes care team. Ask if you should take a   dose the morning of your test. -   If you take diabetes medicine by mouth, don't take it on the morning of your test. Bring it with you to take after the test.  (If you have questions, call your diabetes care team) 7.  When you arrive, please tell us if: -   You have diabetes. -   You have taken Viagra, Cialis or Levitra in the past 48 hours. 8.  For any questions that cannot be answered, please contact the  ordering physician            Feb 15, 2018  4:20 PM CST   (Arrive by 4:05 PM)   CT CHEST ABDOMEN PELVIS W/O & W CONTRAST with UCCT2   [facility] CT ([facility])    [address] [id number] [phone]           Please bring any scans or X-rays taken at other hospitals, if similar tests were done. Also bring a list of your medicines, including vitamins, minerals and over-the-counter drugs. It is safest to leave personal items at home.  Be sure to tell your doctor:   If you have any allergies.   If there s any chance you are pregnant.   If you are breastfeeding.  You may have contrast for this exam. To prepare:   Do not eat or drink for 2 hours before your exam. If you need to take medicine, you may take it with small sips of water. (We may ask you to take liquid medicine as well.)   The day before your exam, drink extra fluids at least six 8-ounce glasses (unless your doctor tells you to restrict your fluids).   You will be given instructions on how to drink the contrast.  Patients over 70 or patients with diabetes or kidney problems:   If you haven t had a blood test (creatinine test) within the last 30 days, the Cardiologist/Radiologist may require you to get this test prior to your exam.  If you have diabetes:   Continue to take your metformin medication on the day of your exam  Please wear loose clothing, such as a sweat suit or jogging clothes. Avoid snaps, zippers and other metal. We may ask you to undress and put on a hospital gown.  If you have any questions, please call the Imaging Department where you will have your exam.            Mar 06, 2018 12:00 PM CST   (Arrive by 11:45 AM)   Return Visit with Mic Renner MD   [facility] Interventional Radiology ([facility])    [address] [address] [phone]              Future tests that were ordered for you today     Open  "Future Orders        Priority Expected Expires Ordered    CBC with platelets differential STAT  2/15/2019 2/15/2018    Comprehensive metabolic panel STAT  2/15/2019 2/15/2018    INR STAT  2/15/2019 2/15/2018    CT Chest abdomen pelvis w & w/o contrast Routine  2/15/2019 2/15/2018            Who to contact     If you have questions or need follow up information about today's clinic visit or your schedule please contact [facility] directly at [phone].  Normal or non-critical lab and imaging results will be communicated to you by [employer], letter or phone within 4 business days after the clinic has received the results. If you do not hear from us within 7 days, please contact the clinic through [employer] or phone. If you have a critical or abnormal lab result, we will notify you by phone as soon as possible.  Submit refill requests through [employer] or call your pharmacy and they will forward the refill request to us. Please allow 3 business days for your refill to be completed.          Additional Information About Your Visit        [employer] [facility] Information     [employer] lets you send messages to your doctor, view your test results, renew your prescriptions, schedule appointments and more. To sign up, go to [email or web address] . Click on \"Log in\" on the left side of the screen, which will take you to the Welcome page. Then click on \"Sign up Now\" on the right side of the page.     You will be asked to enter the access code listed below, as well as some personal information. Please follow the directions to create your username and password.     Your access code is: [email or web address]  Expires: 2018  2:04 PM     Your access code will  in 90 days. If you need help or a new code, please call your [facility] clinic or [phone].        Care EveryWhere ID     This is your Care EveryWhere ID. This could be used by other organizations to access your [facility] medical records  [id number]        Your " "Vitals Were     Pulse Temperature Respirations Height Pulse Oximetry BMI (Body Mass Index)    95 98.5  F (36.9  C) (Oral) 20 1.6 m (5' 3\") 95% 26.04 kg/m2       Blood Pressure from Last 3 Encounters:   02/15/18 133/82   02/10/18 119/66   02/05/18 114/62    Weight from Last 3 Encounters:   02/15/18 66.7 kg (147 lb)   02/04/18 66.5 kg (146 lb 9.7 oz)              Today, you had the following     No orders found for display       Primary Care Provider Office Phone # Fax #    Jonna Clements [phone]       [facility] [address] [facility] [address]        Equal Access to Services     GETACHEW MICHEL : Susie velasquez Sosrinivas, waaxpaolo luqadaha, qaybta kaalmada adeegyada, aure garcia . So [facility] [phone].    ATENCIÓN: Si habla español, tiene a bhagat disposición servicios gratuitos de asistencia lingüística. Llame al [phone].    We comply with applicable federal civil rights laws and [place] laws. We do not discriminate on the basis of race, color, national origin, age, disability, sex, sexual orientation, or gender identity.            Thank you!     Thank you for choosing [facility]  for your care. Our goal is always to provide you with excellent care. Hearing back from our patients is one way we can continue to improve our services. Please take a few minutes to complete the written survey that you may receive in the mail after your visit with us. Thank you!             Your Updated Medication List - Protect others around you: Learn how to safely use, store and throw away your medicines at www.disposemymeds.org.          This list is accurate as of 2/15/18 10:56 AM.  Always use your most recent med list.                   Brand Name Dispense Instructions for use Diagnosis    albuterol 108 (90 BASE) MCG/ACT Inhaler    PROAIR HFA/PROVENTIL HFA/VENTOLIN HFA     Inhale 2 puffs into the lungs every 4 hours as needed for shortness of breath / " dyspnea or wheezing        fluticasone 50 MCG/ACT spray    FLONASE     Spray 2 sprays into both nostrils daily        fluticasone-salmeterol 250-50 MCG/DOSE diskus inhaler    ADVAIR    1 Inhaler    Inhale 1 puff into the lungs 2 times daily    Chronic obstructive pulmonary disease, unspecified COPD type (H)       folic acid 1 MG tablet    FOLVITE    30 tablet    Take 1 tablet (1 mg) by mouth daily    Alcohol abuse       ipratropium - albuterol 0.5 mg/2.5 mg/3 mL 0.5-2.5 (3) MG/3ML neb solution    DUONEB    360 mL    Take 1 vial (3 mLs) by nebulization 4 times daily    Chronic obstructive pulmonary disease, unspecified COPD type (H)       loratadine 10 MG tablet    CLARITIN    30 tablet    Take 1 tablet (10 mg) by mouth daily    Chronic obstructive pulmonary disease, unspecified COPD type (H), Acute seasonal allergic rhinitis, unspecified trigger       LORAZEPAM PO      Take 0.5 mg by mouth every 8 hours as needed for anxiety Prescribed in January 2018- patient has not started taking it yet.        multivitamins with minerals Liqd liquid      15 mLs by Per Feeding Tube route daily        QUEtiapine 25 MG tablet    SEROquel    60 tablet    Take 1 tablet (25 mg) by mouth 2 times daily    Encephalopathy       thiamine 100 MG tablet     30 tablet    Take 1 tablet (100 mg) by mouth daily    Alcohol abuse

## 2018-02-15 NOTE — DISCHARGE INSTRUCTIONS

## 2018-02-15 NOTE — PROGRESS NOTES
Pt here for dobutamine stress test.  Test, meds and side effects reviewed with patient.  Achieved target HR at 50 mcg/kg/min Dobutamine and a total of 0.2 mg IV atropine.  Gave a total of 3 mg IV Metoprolol to bring HR back to baseline.  Post monitoring complete and VSS.  Pt escorted out to the gold waiting room.

## 2018-02-15 NOTE — LETTER
2/15/2018       RE: Naseem Ferrer  [address] [place] [address]     Dear Colleague,    Thank you for referring your patient, Naseem Ferrer, to the [facility]. Please see a copy of my visit note below.    [facility] - Surgical Oncology    NEW CONSULTATION  Feb 15, 2018    Naseem Ferrer is a 62 year old male who presents with a large right hepatic mass.  He was referred by Dr Rangel.    HISTORY OF PRESENT ILLNESS:  He began having right upper quadrant abdominal pain a few days prior to presentation to an outside hospital for the same complaint on January 28, 2018.  He has had a chest CT and abdominal MR about 1 week prior which revealed a large right liver mass.  He presented to the hospital on January 28, 2018 there was laboratory and radiographic evidence of tumor rupture with intraperitoneal hemorrhage.  Thus, he was transferred to the [facility].  I was consulted on the patient the following morning.  He went to interventional radiology that day to embolize the tumor.  He received 2 or 3 units of blood while in the hospital but there was no evidence of further bleeding after his embolization procedure.  Of note, the patient had significant COPD remained on the ventilator for a period of time but was eventually extubated and discharged home.  Of note, he was discharged home on home oxygen.  Prior to this hospitalization he had been on inhalers for several years for his COPD but it never been on home oxygen.    Since his discharge, the patient has been seen by cardiology and pulmonology.  He reports that he is remained on 6 L of oxygen while at home and his sats have been in the mid 90s during his doctor's visits.  He says he has not been walking up and down the stairs in his house as frequently as before.  Prior to his hospitalization he reported that he would carry the laundry up and down a flight of stairs without much  "difficulty.  He reports that he does not have any abdominal pain but he does still feel \"distended.\"  He is having normal bowel movements and eating a regular diet.    Past Medical History:   Diagnosis Date     COPD (chronic obstructive pulmonary disease) (H)     very severe. O2 dependent, h/o intubation. frequent exacerbations.     Depression      Hypertension      Liver lesion     liver mass concerning for HCC, dx not confirmed; adm with hemorrhage from lesion s/p embolization     PAST SURGICAL HISTORY:  No past surgical history on file.  Arthroscopic shoulder surgery.    Current Outpatient Prescriptions   Medication Sig Dispense Refill     albuterol (PROAIR HFA/PROVENTIL HFA/VENTOLIN HFA) 108 (90 BASE) MCG/ACT Inhaler Inhale 2 puffs into the lungs every 4 hours as needed for shortness of breath / dyspnea or wheezing       fluticasone (FLONASE) 50 MCG/ACT spray Spray 2 sprays into both nostrils daily       folic acid (FOLVITE) 1 MG tablet Take 1 tablet (1 mg) by mouth daily 30 tablet 0     multivitamins with minerals (CERTAVITE/CEROVITE) LIQD liquid 15 mLs by Per Feeding Tube route daily  0     thiamine 100 MG tablet Take 1 tablet (100 mg) by mouth daily 30 tablet 0     QUEtiapine (SEROQUEL) 25 MG tablet Take 1 tablet (25 mg) by mouth 2 times daily 60 tablet 0     ipratropium - albuterol 0.5 mg/2.5 mg/3 mL (DUONEB) 0.5-2.5 (3) MG/3ML neb solution Take 1 vial (3 mLs) by nebulization 4 times daily 360 mL 0     fluticasone-salmeterol (ADVAIR) 250-50 MCG/DOSE diskus inhaler Inhale 1 puff into the lungs 2 times daily 1 Inhaler 0     LORAZEPAM PO Take 0.5 mg by mouth every 8 hours as needed for anxiety Prescribed in January 2018- patient has not started taking it yet.       loratadine (CLARITIN) 10 MG tablet Take 1 tablet (10 mg) by mouth daily (Patient not taking: Reported on 2/15/2018) 30 tablet 0           Allergies   Allergen Reactions     Mold Unknown     Other reaction(s): Other  Inability to breathe     Tramadol " "Unknown, Other (See Comments), Diarrhea, Rash and Nausea and Vomiting        SOCIAL HISTORY:   reports that he quit smoking about 2 weeks ago. He has a 50.00 pack-year smoking history. He has never used smokeless tobacco.    FAMILY HISTORY:  Family History   Problem Relation Age of Onset     LUNG DISEASE Mother      CANCER Father      CANCER Sister        ROS  A full 14-point review of systems was performed, and the pertinent positives and negatives were mentioned in the history of present illness.    /82  Pulse 95  Temp 98.5  F (36.9  C) (Oral)  Resp 20  Ht 1.6 m (5' 3\")  Wt 66.7 kg (147 lb)  SpO2 95%  BMI 26.04 kg/m2   Physical Exam  General: He is sitting in a wheelchair and on 6 L of oxygen.  He does not appear dyspneic at this time.  Head: Normocephalic, anicteric sclera.  Neck: No cervical or supraclavicular adenopathy.  Pulmonary: Bilateral chest rise, on oxygen.  Cardiac: regular rhythm with heart rate in the 90s.  Abdomen: No abdominal scars, moderately distended, not tympanitic to percussion, no tenderness, no definite mass appreciated.  Extremities: No lower extremity edema.    INVESTIGATIONS:  Labs: Pending from today.  Previously, his liver function tests were normal, platelet count has been 250s, CEA 2.8, AFP 2.8, CA-19-9 18.  Hepatitis C serology was positive.    CT/MRI (January 2018): There is a 13 cm mass within the anterior right anterior hepatic lobe, without any evidence of additional lesions. On the CT scan there was hematoma surrounding the liver and right upper quadrant as well as hematoma in the lower abdomen and pelvis.  There was no evidence of cirrhosis.  Did not appreciate any evidence of metastatic disease.    Biopsy: None    ASSESSMENT/PLAN:  Naseem Ferrer is a 62 year old male with large right hepatic mass (possibly hepatocellular carcinoma, fibrolamellar subtype, or angiosarcoma, etc.) and severe COPD.    The natural history of hepatocellular carcinoma were discussed " with the patient and his family friend.  I explained that we do not have a definitive diagnosis at this time but that I believe this is either a fibrolamellar hepatocellular carcinoma or a traditional hepatocellular carcinoma arising in the setting of hepatitis C. I explained to him that I spent a significant amount of time discussing his COPD with Dr. Manley.  She feels that he has very severe COPD (FEV1 0.5)  and is at very high risk of respiratory failure postoperatively which be would be characterized by repeat intubation and/or the development of pneumonia.  I also explained that surgical removal of this tumor would require a formal right hepatectomy which is a very large surgery.  Essentially everyone who undergoes right hepatectomy develops a reactive right pleural effusion.  This would make his risk of respiratory failure postoperative even higher.  At the conclusion of my discussion with Dr. Manley we determined that his rate of postoperative respiratory failure is likely greater than 50%.  I told the patient that I do not recommend a right hepatectomy given his high risk of postoperative respiratory failure.  We will discuss his case tomorrow at liver multidisciplinary care conference.  I think the most appropriate way to proceed at this point is with a biopsy of this mass which will help define his potential treatment options.  I explained to the patient that he needs to attend his pulmonary rehab appointments.  If at some point in the future his pulmonary function has improved and may reconsider possible right hepatectomy.    Total time spent with the patient was 60 minutes, of which more than half was counseling.     Chan Connelly MD    Division of Surgical Oncology  [facility]    Again, thank you for allowing me to participate in the care of your patient.      Sincerely,    Chan Connelly MD

## 2018-02-28 NOTE — ADDENDUM NOTE
Encounter addended by: Maria Black on: 2/28/2018  3:21 PM<BR>     Actions taken: Imaging Exam begun, Order Reconciliation Section accessed

## 2018-03-05 NOTE — TELEPHONE ENCOUNTER
Received the following voicemail from pt at 11:59 am: I'm calling in reference to my prescriptions I received at discharge. I'm running out and do no have any refills.     Pt was prescribed on 2/5/18: folic acid, thiamine, quetiapine and loratadine. He was to continue duoneb and advair which were also prescribed. He saw cardiology on 2/10, pulmonary on 2/12 and Dr. Connelly on 2/15. Called pt and suggested he contact PCP if he is still taking the quetiapine, as it was noted in his chart 12/15 that he was no longer taking this. Pt stated he will call PCP as he also has a question on whether he can get pain med for a muscle he pulled in his side. Informed him if he had not had a hospital follow-up with his PCP, he should make an apt and be seen. He verbalized understanding.

## 2018-03-13 NOTE — MR AVS SNAPSHOT
"              After Visit Summary   3/13/2018    Naseem Ferrer    MRN: [id number]           Patient Information     Date Of Birth          1955        Visit Information        Provider Department      3/13/2018 12:20 PM Mic Renner MD [facility] Interventional Radiology        Care Instructions    We will call you with the appointment details of your Radioembolizaton procedure.     As discussed with you, I will need to seek insurance approval before scheduling you for your procedure. This may take up to two weeks, however I will keep you updated. Please feel free to call me for updates as well.     Please check into the Gold Waiting room, located on the main level, at [facility], located at [address] [place] [address].       Reminders:    -No solids or milk products 6 hours prior to appointment time.    -No clear liquids 2 hours prior to appointment time.     -Please take your morning medications as indicated with enough water to swallow    -Please have a  as you will be going home afterwards.        Radioembolization consists of two procedures.     1.  Y90  Mapping- This procedure will help us see the vasculature of your liver and help us determine the shunting from the liver to the lung and gastric area.   Please allow for a total time of 3-5 hours for this day.  You will need to \"pass\" this procedure before the Delivery day. This means that if you're \"shunting\" calculation is higher than a certain percentage (20%) then we may not be able to move forward with treatment due to risk of the radioembolization particles migrating elsewhere besides the tumor itself. If you do pass then we will move forward with the Deliver day.     On the day of the appointment, you will check  in 1.5 hours early to your scheduled procedure.     2. Y90 Delivery procedure.  This procedure is the actual delivery of the radioactive particles into the liver.   This will take approximately 2 " hours. Please also plan for at least 3-5 hours for this day as well.     On the day of the appointment you will check in 2 hours early to your scheduled procedure.        -Post follow up: We will call you 2-3 days after you leave to follow up after the procedure.     We will also have you return in 1 mos with an MRI and follow upappt.       *Please keep in mind that you may have Post Embolization syndrome.  The reason for this is because the tumor is dying.     This includes:    -high fevers 101-102, which may last for a couple of days. We recommend using over the counter medications such as Tylenol or Ibuprofen.     -Nausea, in which we will give you medications after you are discharged home.     -Decreased appetite: We don't expect you to eat 3 full meals. Instead, we prefer that you  snack through out the day.     -Feeling fatigue: this is normal, however we recommend that you get up and walk around.     -Radiation Precaution (please do these things for 5 days)     - we advise that although you are not radiating a large amount of radiation, please refrain from being near small children, pregnant women and those who have a compromised immune system. We advise a 3 feet distance for no more than 30 minutes.     -PLEASE ALSO MAKE SURE THAT YOU FLUSH TWICE WHEN USING THE BATHROOM, and make sure to rinse the sink and tub after each time you use them.      -Wash your hands with soap and water after each visit to the bathroom.      -Use separate eating utensils. Wash them separately from your family's dishes.      -Avoid public transportation (buses, trains, taxis, light rail) and crowded places such as stores, restaurants, theaters and sporting events.      -Sleep alone, Avoid intimate contact; No kissing, or intercourse     **Please keep in mind to stay hydrated after the procedure. At Least 6 glasses of water a day.        *Abnormal symptoms in which to call or seek immediate help:  1. Confusion!!-GO TO THE EMERGENCY  ROOM.  2. Swelling of the lower legs  3. Severe abdominal pain that doesn't go away with pain medications.   4. High fevers that do not come down with over the counter medications.      Should ANY of the above symptoms are exhibited, please seek immediate help or call our hospital at [phone] and ask for the Interventional Radiologist On-call (after hours) or you can contact me (during business hours) 8-4pm.     Should you have any further questions, please call us at anytime. It has been a pleasure to see you today.           Leni Lee RN  Interventional Radiology Nurse Coordinator   Phone: [phone]              Follow-ups after your visit        Who to contact     Please call your clinic at [phone] to:    Ask questions about your health    Make or cancel appointments    Discuss your medicines    Learn about your test results    Speak to your doctor            Additional Information About Your Visit        [employer] Information     [employer] is an electronic gateway that provides easy, online access to your medical records. With [employer], you can request a clinic appointment, read your test results, renew a prescription or communicate with your care team.     To sign up for [employer] visit the website at [email or web address]   You will be asked to enter the access code listed below, as well as some personal information. Please follow the directions to create your username and password.     Your access code is: [email or web address]  Expires: 2018  3:04 PM     Your access code will  in 90 days. If you need help or a new code, please contact your [facility] or call [phone] for assistance.        Care EveryWhere ID     This is your Care EveryWhere ID. This could be used by other organizations to access your [facility] medical records  [id number]        Your Vitals Were     Pulse Pulse Oximetry                87 93%           Blood Pressure from Last 3 Encounters:    03/13/18 141/83   02/15/18 133/82   02/10/18 119/66    Weight from Last 3 Encounters:   02/15/18 66.7 kg (147 lb)   02/04/18 66.5 kg (146 lb 9.7 oz)              Today, you had the following     No orders found for display       Primary Care Provider Office Phone # Fax #    Jonna Clements [phone]       [facility] [address] [address]        Equal Access to Services     GETACHEW MICHEL : Hadii aad ku hadasho Soomaali, waaxda luqadaha, qaybta kaalmada adeegyada, waxay idiin hayaan adeeg kamini garcia . So [facility] [phone].    ATENCIÓN: Si habla español, tiene a bhagat disposición servicios gratuitos de asistencia lingüística. [facility] [phone].    We comply with applicable federal civil rights laws and [place] laws. We do not discriminate on the basis of race, color, national origin, age, disability, sex, sexual orientation, or gender identity.            Thank you!     Thank you for choosing [facility] INTERVENTIONAL RADIOLOGY  for your care. Our goal is always to provide you with excellent care. Hearing back from our patients is one way we can continue to improve our services. Please take a few minutes to complete the written survey that you may receive in the mail after your visit with us. Thank you!             Your Updated Medication List - Protect others around you: Learn how to safely use, store and throw away your medicines at www.disposemymeds.org.          This list is accurate as of 3/13/18  1:06 PM.  Always use your most recent med list.                   Brand Name Dispense Instructions for use Diagnosis    albuterol 108 (90 BASE) MCG/ACT Inhaler    PROAIR HFA/PROVENTIL HFA/VENTOLIN HFA     Inhale 2 puffs into the lungs every 4 hours as needed for shortness of breath / dyspnea or wheezing        fluticasone 50 MCG/ACT spray    FLONASE     Spray 2 sprays into both nostrils daily        fluticasone-salmeterol 250-50 MCG/DOSE diskus inhaler    ADVAIR    1 Inhaler     Inhale 1 puff into the lungs 2 times daily    Chronic obstructive pulmonary disease, unspecified COPD type (H)       folic acid 1 MG tablet    FOLVITE    30 tablet    Take 1 tablet (1 mg) by mouth daily    Alcohol abuse       ipratropium - albuterol 0.5 mg/2.5 mg/3 mL 0.5-2.5 (3) MG/3ML neb solution    DUONEB    360 mL    Take 1 vial (3 mLs) by nebulization 4 times daily    Chronic obstructive pulmonary disease, unspecified COPD type (H)       loratadine 10 MG tablet    CLARITIN    30 tablet    Take 1 tablet (10 mg) by mouth daily    Chronic obstructive pulmonary disease, unspecified COPD type (H), Acute seasonal allergic rhinitis, unspecified trigger       LORAZEPAM PO      Take 0.5 mg by mouth every 8 hours as needed for anxiety Prescribed in January 2018- patient has not started taking it yet.        multivitamins with minerals Liqd liquid      15 mLs by Per Feeding Tube route daily        QUEtiapine 25 MG tablet    SEROquel    60 tablet    Take 1 tablet (25 mg) by mouth 2 times daily    Encephalopathy       thiamine 100 MG tablet     30 tablet    Take 1 tablet (100 mg) by mouth daily    Alcohol abuse

## 2018-03-13 NOTE — LETTER
3/13/2018       RE: Naseem Ferrer  [address] [place] [address]     Dear Colleague,    Thank you for referring your patient, Naseem Ferrer, to the [facility] INTERVENTIONAL RADIOLOGY at [facility]. Please see a copy of my visit note below.          Interventional Radiology Clinic Visit    Date of this visit: 3/13/2018    Naseem Ferrer  is referred by Dr. Connelly for treatment recommendations. The patient requires evaluation for diagnosis of possible HCC     Primary Physician: Jonna Clements        History Of Present Illness:    Naseem Ferrer is a 63 year old male who presents with diagnosis of possible HCC.  He presented to [facility] ER with abdominal hemorrhage and was found to have bleeding from a very large hepatic mass.  He required intubation, aggressive resuscitation and eventually bland embolization using particles.    He became stable post-procedure and eventually discharged.  He was visited by Dr. Connelly for possible resection of the mass.  So far he has not had oncology or hepatology visits.      His PMH is most significant for severe COPD and HTN. No known hx of hepatitis and cirrhosis.  Since the recent hospitalization his pulmonary function has regressed and now he needs a nasal cannula to maintain his sats above 90%.  He reports feeling much better as the days go by after discharge and believes he is heading the right direction. He denies having nausea or jaundice, has some shortness of breath (gradually improving) and intermittent abdominal pain.  He has a well compensated liver disease with prior HBV infection (HBS Ag neg and HBS Ab positive) and positive HCV Ab. T bili 1.2, Alb 2.7 (old), Cr 0.86, and mild transaminitis.    He is here today to discuss further diagnostic plans and possible treatment options.  I discussed with Saqib the importance of involving a hepatologist in his care , the need for biopsy of the lesion for more definitive diagnosis  and eventually if the diagnosis is established as HCC the possible therpeutic options including resection (which he dooes    Review of Systems:    General: Negative for recent fever.  Skin: Negative for jaundice.  Eyes: Negative for jaundice.  Respiratory: Positive for shortness of breath.  Cardiovascular: Negative for chest pain.  Gastrointestinal: Abdominal pain.  Negative for swelling, nausea, vomiting, or diarrhea.  Musculoskeletal: Negative for ankle swelling.    Past Medical/Surgical History:    Past Medical History:   Diagnosis Date     COPD (chronic obstructive pulmonary disease) (H)     very severe. O2 dependent, h/o intubation. frequent exacerbations.     Depression      Hypertension      Liver lesion     liver mass concerning for HCC, dx not confirmed; adm with hemorrhage from lesion s/p embolization     No past surgical history on file.    Current Medications:    Current Outpatient Prescriptions   Medication Sig Dispense Refill     albuterol (PROAIR HFA/PROVENTIL HFA/VENTOLIN HFA) 108 (90 BASE) MCG/ACT Inhaler Inhale 2 puffs into the lungs every 4 hours as needed for shortness of breath / dyspnea or wheezing       fluticasone (FLONASE) 50 MCG/ACT spray Spray 2 sprays into both nostrils daily       LORAZEPAM PO Take 0.5 mg by mouth every 8 hours as needed for anxiety Prescribed in January 2018- patient has not started taking it yet.       folic acid (FOLVITE) 1 MG tablet Take 1 tablet (1 mg) by mouth daily 30 tablet 0     multivitamins with minerals (CERTAVITE/CEROVITE) LIQD liquid 15 mLs by Per Feeding Tube route daily  0     thiamine 100 MG tablet Take 1 tablet (100 mg) by mouth daily 30 tablet 0     ipratropium - albuterol 0.5 mg/2.5 mg/3 mL (DUONEB) 0.5-2.5 (3) MG/3ML neb solution Take 1 vial (3 mLs) by nebulization 4 times daily 360 mL 0     fluticasone-salmeterol (ADVAIR) 250-50 MCG/DOSE diskus inhaler Inhale 1 puff into the lungs 2 times daily 1 Inhaler 0     QUEtiapine (SEROQUEL) 25 MG tablet Take  1 tablet (25 mg) by mouth 2 times daily (Patient not taking: Reported on 2/15/2018) 60 tablet 0     loratadine (CLARITIN) 10 MG tablet Take 1 tablet (10 mg) by mouth daily (Patient not taking: Reported on 2/15/2018) 30 tablet 0       Allergies:    Mold and Tramadol    Family History:    Family History   Problem Relation Age of Onset     LUNG DISEASE Mother      CANCER Father      CANCER Sister        Social History:    . Former smoker and EtOH use.    Social History     Social History     Marital status:      Spouse name: N/A     Number of children: N/A     Years of education: N/A     Social History Main Topics     Smoking status: Former Smoker     Packs/day: 1.00     Years: 50.00     Quit date: 1/29/2018     Smokeless tobacco: Never Used     Alcohol use Not on file     Drug use: Not on file     Sexual activity: Not on file     Other Topics Concern     Not on file     Social History Narrative       Physical Exam:    /83  Pulse 87  SpO2 93%     GENERAL APPEARANCE: Nasal cannula, accompanied by a friend,h alert and no distress  PSYCHIATRIC: mentation appears normal and affect normal.  EYES: No jaundice.  SKIN: No jaundice.   RESP: lungs bilateral mild wheezing to auscultation  CARDIOVASCULAR: regular rates and rhythm, normal S1 S2, no S3 or S4 and no murmur  ABDOMEN:  Mild RUQ TTP, no rebound or guarding.  Soft, no masses and bowel sounds normal.  MUSCULOSKELETAL: No edema in the lower extremities.    Laboratory Studies:    Lab Results   Component Value Date    HGB 9.6 02/05/2018     Lab Results   Component Value Date     02/05/2018     Lab Results   Component Value Date    WBC 13.5 02/05/2018       Lab Results   Component Value Date    INR 1.01 01/30/2018       Lab Results   Component Value Date    PROTTOTAL 5.9 02/04/2018      Lab Results   Component Value Date    ALBUMIN 2.0 02/04/2018     Lab Results   Component Value Date    BILITOTAL 1.8 02/04/2018     No results found for: BILICONJ    Lab Results   Component Value Date    ALKPHOS 100 02/04/2018     Lab Results   Component Value Date    AST Unsatisfactory specimen - hemolyzed 02/04/2018     Lab Results   Component Value Date    ALT 73 02/04/2018       Lab Results   Component Value Date    CR 0.57 02/04/2018     No results found for: GFR    Alpha Fetoprotein   Date Value Ref Range Status   02/02/2018 2.8 0 - 8 ug/L Final     Comment:     Assay Method:  Chemiluminescence using Siemens Centaur XP       Imaging:     I reviewed the MRI from 1/19/2018 revealing a large heterogenous R hepatic mass, with complex imaging characteristics leaving a differential that includes HCC but is not specific.  Similar findings on CT from 2/15/2018.  Also reviewed was the angio dated 1/29/2018.      ASSESSMENT:      Naseem Ferrer is a 63 year old male with large heterogenous previously hemorrhagic large R liver mass.     Child-Rosa score:8 (B)  Native MELD score: 7  ECOG performance status: 0-1    I believe this pt will need to have definitive tissue diagnosis, so we will schedule a percutaneous US guided biopsy.      Additionally he needs to establish his care with a hepatologist which we'll provide him with appropriate referral .    Once these steps are taken, if a dx of HCC is established I believe the patient is a suitable candidate for a radioembolization procedure.    I showed Mr. Ferrer the imaging and discussed the findings. I discussed with him that the goal of the procedure is disease control with a survival benefit rather than a cure given the nature of the disease, but that sometimes lesions will be cured in this manner. Alternatives were reviewed, including surgery, but the patient is not a surgical candidate.  I explained the radioembolization procedure  - that it is a two step procedure on two different days that involves an angiogram and nuclear medicine study on each day, and that it requires insurance pre-approval. I explained that the first  procedure involves mapping the arteries to the liver and embolizing any sidebranches that place the patient at risk of non-target radiation injury, then checking for shunting to the lungs. The second procedure, assuming relevant sidebranches were embolizable and the lung shunting is not too high, involves delivering the radiation beads intra-arterially and imaging the liver afterwards to assess the pattern of radiation distribution.   I explained that both procedures are performed under conscious sedation. We discussed what will happen before, during and after the procedure; what to expect in the post procedure recovery period; and what the follow-up will be. We discussed the risks of the procedure which include, but are not limited to, vascular injury causing bleeding or occlusion of blood vessels, groin hematoma, infection, biloma, liver failure, non-target radiation injury to structures such as gallbladder/bowel/pancreas/lung (with risks such as bowel ulceration, pancreatitis or pneumonitis), and incomplete treatment. Risks are increased the greater the deviation from normal lab values, especially albumin and total bilirubin, and also the larger the area we must treat. We also discussed the post-radioembolization syndrome which consists of varying degrees of pain, nausea, and lethargy. I also explained that new tumors may develop elsewhere given the nature of the disease. Most patients go home the same day, but occasionally circumstances are such that a longer admission may be required. I also informed the patient that I will be assisted during the procedure by a fellow and/or resident, and that sometimes a medical student may be observing. All of Saqib's questions were answered and he agreed to proceed.     PLAN:  - Hepatology referral  - US guided biopsy  - Pending the hepatology visit conclusion, biopsy results and insurance pre-approval, we will schedule  Mr. Ferrer for the Y90 mapping procedure  accordingly.      A total of 60 minutes was spent in care for the patient, of which >50% was spent in counseling and co-ordination of care.    It was a pleasure seeing the patient.     Mic Garcia M.D.  Department of Interventional Radiology  [facility]     CC  Patient Care Team:  Jonna Clements MD as PCP - General (Family Practice)  SOFIA Hoskins MD as MD (Cardiology)  Nelda Manley MD as MD (Pulmonary)  Chan Connelly MD as MD (Surgery Surgical Oncology)  Chante Jacques, WYATT as Registered Nurse

## 2018-03-13 NOTE — PROGRESS NOTES
Interventional Radiology Clinic Visit    Date of this visit: 3/13/2018    Naseem Ferrer  is referred by Dr. Connelly for treatment recommendations. The patient requires evaluation for diagnosis of possible HCC     Primary Physician: Jonna Clements        History Of Present Illness:    Naseem Ferrer is a 63 year old male who presents with diagnosis of possible HCC.  He presented to [facility] ER with abdominal hemorrhage and was found to have bleeding from a very large hepatic mass.  He required intubation, aggressive resuscitation and eventually bland embolization using particles.    He became stable post-procedure and eventually discharged.  He was visited by Dr. Connelly for possible resection of the mass.  So far he has not had oncology or hepatology visits.      His PMH is most significant for severe COPD and HTN. No known hx of hepatitis and cirrhosis.  Since the recent hospitalization his pulmonary function has regressed and now he needs a nasal cannula to maintain his sats above 90%.  He reports feeling much better as the days go by after discharge and believes he is heading the right direction. He denies having nausea or jaundice, has some shortness of breath (gradually improving) and intermittent abdominal pain.  He has a well compensated liver disease with prior HBV infection (HBS Ag neg and HBS Ab positive) and positive HCV Ab. T bili 1.2, Alb 2.7 (old), Cr 0.86, and mild transaminitis.    He is here today to discuss further diagnostic plans and possible treatment options.  I discussed with Saqib the importance of involving a hepatologist in his care , the need for biopsy of the lesion for more definitive diagnosis and eventually if the diagnosis is established as HCC the possible therpeutic options including resection (which he dooes    Review of Systems:    General: Negative for recent fever.  Skin: Negative for jaundice.  Eyes: Negative for jaundice.  Respiratory: Positive for shortness of  breath.  Cardiovascular: Negative for chest pain.  Gastrointestinal: Abdominal pain.  Negative for swelling, nausea, vomiting, or diarrhea.  Musculoskeletal: Negative for ankle swelling.    Past Medical/Surgical History:    Past Medical History:   Diagnosis Date     COPD (chronic obstructive pulmonary disease) (H)     very severe. O2 dependent, h/o intubation. frequent exacerbations.     Depression      Hypertension      Liver lesion     liver mass concerning for HCC, dx not confirmed; adm with hemorrhage from lesion s/p embolization     No past surgical history on file.    Current Medications:    Current Outpatient Prescriptions   Medication Sig Dispense Refill     albuterol (PROAIR HFA/PROVENTIL HFA/VENTOLIN HFA) 108 (90 BASE) MCG/ACT Inhaler Inhale 2 puffs into the lungs every 4 hours as needed for shortness of breath / dyspnea or wheezing       fluticasone (FLONASE) 50 MCG/ACT spray Spray 2 sprays into both nostrils daily       LORAZEPAM PO Take 0.5 mg by mouth every 8 hours as needed for anxiety Prescribed in January 2018- patient has not started taking it yet.       folic acid (FOLVITE) 1 MG tablet Take 1 tablet (1 mg) by mouth daily 30 tablet 0     multivitamins with minerals (CERTAVITE/CEROVITE) LIQD liquid 15 mLs by Per Feeding Tube route daily  0     thiamine 100 MG tablet Take 1 tablet (100 mg) by mouth daily 30 tablet 0     ipratropium - albuterol 0.5 mg/2.5 mg/3 mL (DUONEB) 0.5-2.5 (3) MG/3ML neb solution Take 1 vial (3 mLs) by nebulization 4 times daily 360 mL 0     fluticasone-salmeterol (ADVAIR) 250-50 MCG/DOSE diskus inhaler Inhale 1 puff into the lungs 2 times daily 1 Inhaler 0     QUEtiapine (SEROQUEL) 25 MG tablet Take 1 tablet (25 mg) by mouth 2 times daily (Patient not taking: Reported on 2/15/2018) 60 tablet 0     loratadine (CLARITIN) 10 MG tablet Take 1 tablet (10 mg) by mouth daily (Patient not taking: Reported on 2/15/2018) 30 tablet 0       Allergies:    Mold and Tramadol    Family  History:    Family History   Problem Relation Age of Onset     LUNG DISEASE Mother      CANCER Father      CANCER Sister        Social History:    . Former smoker and EtOH use.    Social History     Social History     Marital status:      Spouse name: N/A     Number of children: N/A     Years of education: N/A     Social History Main Topics     Smoking status: Former Smoker     Packs/day: 1.00     Years: 50.00     Quit date: 1/29/2018     Smokeless tobacco: Never Used     Alcohol use Not on file     Drug use: Not on file     Sexual activity: Not on file     Other Topics Concern     Not on file     Social History Narrative       Physical Exam:    /83  Pulse 87  SpO2 93%     GENERAL APPEARANCE: Nasal cannula, accompanied by a friend,h alert and no distress  PSYCHIATRIC: mentation appears normal and affect normal.  EYES: No jaundice.  SKIN: No jaundice.   RESP: lungs bilateral mild wheezing to auscultation  CARDIOVASCULAR: regular rates and rhythm, normal S1 S2, no S3 or S4 and no murmur  ABDOMEN:  Mild RUQ TTP, no rebound or guarding.  Soft, no masses and bowel sounds normal.  MUSCULOSKELETAL: No edema in the lower extremities.    Laboratory Studies:    Lab Results   Component Value Date    HGB 9.6 02/05/2018     Lab Results   Component Value Date     02/05/2018     Lab Results   Component Value Date    WBC 13.5 02/05/2018       Lab Results   Component Value Date    INR 1.01 01/30/2018       Lab Results   Component Value Date    PROTTOTAL 5.9 02/04/2018      Lab Results   Component Value Date    ALBUMIN 2.0 02/04/2018     Lab Results   Component Value Date    BILITOTAL 1.8 02/04/2018     No results found for: BILICONJ   Lab Results   Component Value Date    ALKPHOS 100 02/04/2018     Lab Results   Component Value Date    AST Unsatisfactory specimen - hemolyzed 02/04/2018     Lab Results   Component Value Date    ALT 73 02/04/2018       Lab Results   Component Value Date    CR 0.57  02/04/2018     No results found for: GFR    Alpha Fetoprotein   Date Value Ref Range Status   02/02/2018 2.8 0 - 8 ug/L Final     Comment:     Assay Method:  Chemiluminescence using Siemens Centaur XP       Imaging:     I reviewed the MRI from 1/19/2018 revealing a large heterogenous R hepatic mass, with complex imaging characteristics leaving a differential that includes HCC but is not specific.  Similar findings on CT from 2/15/2018.  Also reviewed was the angio dated 1/29/2018.      ASSESSMENT:      Naseem Ferrer is a 63 year old male with large heterogenous previously hemorrhagic large R liver mass.     Child-Rosa score:8 (B)  Native MELD score: 7  ECOG performance status: 0-1    I believe this pt will need to have definitive tissue diagnosis, so we will schedule a percutaneous US guided biopsy.      Additionally he needs to establish his care with a hepatologist which we'll provide him with appropriate referral .    Once these steps are taken, if a dx of HCC is established I believe the patient is a suitable candidate for a radioembolization procedure.    I showed Mr. Ferrer the imaging and discussed the findings. I discussed with him that the goal of the procedure is disease control with a survival benefit rather than a cure given the nature of the disease, but that sometimes lesions will be cured in this manner. Alternatives were reviewed, including surgery, but the patient is not a surgical candidate.  I explained the radioembolization procedure  - that it is a two step procedure on two different days that involves an angiogram and nuclear medicine study on each day, and that it requires insurance pre-approval. I explained that the first procedure involves mapping the arteries to the liver and embolizing any sidebranches that place the patient at risk of non-target radiation injury, then checking for shunting to the lungs. The second procedure, assuming relevant sidebranches were embolizable and the  lung shunting is not too high, involves delivering the radiation beads intra-arterially and imaging the liver afterwards to assess the pattern of radiation distribution.   I explained that both procedures are performed under conscious sedation. We discussed what will happen before, during and after the procedure; what to expect in the post procedure recovery period; and what the follow-up will be. We discussed the risks of the procedure which include, but are not limited to, vascular injury causing bleeding or occlusion of blood vessels, groin hematoma, infection, biloma, liver failure, non-target radiation injury to structures such as gallbladder/bowel/pancreas/lung (with risks such as bowel ulceration, pancreatitis or pneumonitis), and incomplete treatment. Risks are increased the greater the deviation from normal lab values, especially albumin and total bilirubin, and also the larger the area we must treat. We also discussed the post-radioembolization syndrome which consists of varying degrees of pain, nausea, and lethargy. I also explained that new tumors may develop elsewhere given the nature of the disease. Most patients go home the same day, but occasionally circumstances are such that a longer admission may be required. I also informed the patient that I will be assisted during the procedure by a fellow and/or resident, and that sometimes a medical student may be observing. All of Saqib's questions were answered and he agreed to proceed.     PLAN:  - Hepatology referral  - US guided biopsy  - Pending the hepatology visit conclusion, biopsy results and insurance pre-approval, we will schedule  Mr. Ferrer for the Y90 mapping procedure accordingly.      A total of 60 minutes was spent in care for the patient, of which >50% was spent in counseling and co-ordination of care.    It was a pleasure seeing the patient.     Signed,    Mic Renner M.D.  Department of Interventional Radiology  [facility]      CC  Patient Care Team:  Jonna Clements MD as PCP - General (Family Practice)  SOFIA Hoskins MD as MD (Cardiology)  Nelda Manley MD as MD (Pulmonary)  Chan Connelly MD as MD (Surgery Surgical Oncology)  Chante Jacques, RN as Registered Nurse  SELF, REFERRED

## 2018-03-13 NOTE — PATIENT INSTRUCTIONS
"We will call you with the appointment details of your Radioembolizaton procedure.     As discussed with you, I will need to seek insurance approval before scheduling you for your procedure. This may take up to two weeks, however I will keep you updated. Please feel free to call me for updates as well.     Please check into the Gold Waiting room, located on the main level, at [facility], located at [address] [place] [address].       Reminders:    -No solids or milk products 6 hours prior to appointment time.    -No clear liquids 2 hours prior to appointment time.     -Please take your morning medications as indicated with enough water to swallow    -Please have a  as you will be going home afterwards.        Radioembolization consists of two procedures.     1.  Y90  Mapping- This procedure will help us see the vasculature of your liver and help us determine the shunting from the liver to the lung and gastric area.   Please allow for a total time of 3-5 hours for this day.  You will need to \"pass\" this procedure before the Delivery day. This means that if you're \"shunting\" calculation is higher than a certain percentage (20%) then we may not be able to move forward with treatment due to risk of the radioembolization particles migrating elsewhere besides the tumor itself. If you do pass then we will move forward with the Deliver day.     On the day of the appointment, you will check  in 1.5 hours early to your scheduled procedure.     2. Y90 Delivery procedure.  This procedure is the actual delivery of the radioactive particles into the liver.   This will take approximately 2 hours. Please also plan for at least 3-5 hours for this day as well.     On the day of the appointment you will check in 2 hours early to your scheduled procedure.        -Post follow up: We will call you 2-3 days after you leave to follow up after the procedure.     We will also have you return in 1 mos " with an MRI and follow upappt.       *Please keep in mind that you may have Post Embolization syndrome.  The reason for this is because the tumor is dying.     This includes:    -high fevers 101-102, which may last for a couple of days. We recommend using over the counter medications such as Tylenol or Ibuprofen.     -Nausea, in which we will give you medications after you are discharged home.     -Decreased appetite: We don't expect you to eat 3 full meals. Instead, we prefer that you  snack through out the day.     -Feeling fatigue: this is normal, however we recommend that you get up and walk around.     -Radiation Precaution (please do these things for 5 days)     - we advise that although you are not radiating a large amount of radiation, please refrain from being near small children, pregnant women and those who have a compromised immune system. We advise a 3 feet distance for no more than 30 minutes.     -PLEASE ALSO MAKE SURE THAT YOU FLUSH TWICE WHEN USING THE BATHROOM, and make sure to rinse the sink and tub after each time you use them.      -Wash your hands with soap and water after each visit to the bathroom.      -Use separate eating utensils. Wash them separately from your family's dishes.      -Avoid public transportation (buses, trains, taxis, light rail) and crowded places such as stores, restaurants, theaters and sporting events.      -Sleep alone, Avoid intimate contact; No kissing, or intercourse     **Please keep in mind to stay hydrated after the procedure. At Least 6 glasses of water a day.        *Abnormal symptoms in which to call or seek immediate help:  1. Confusion!!-GO TO THE EMERGENCY ROOM.  2. Swelling of the lower legs  3. Severe abdominal pain that doesn't go away with pain medications.   4. High fevers that do not come down with over the counter medications.      Should ANY of the above symptoms are exhibited, please seek immediate help or call our hospital at [phone] and ask  for the Interventional Radiologist On-call (after hours) or you can contact me (during business hours) 8-4pm.     Should you have any further questions, please call us at anytime. It has been a pleasure to see you today.           Leni Lee RN  Interventional Radiology Nurse Coordinator   Phone: [phone]

## 2018-03-14 NOTE — TELEPHONE ENCOUNTER
I called and left a voicemail on wife Miya's phone with number to contact to make appointment for hepatology consult.  Miya and I spoke yesterday and she and discussed this to inquire about patients hepatitis treatment and liver issues.  Per Dr. Renner's recommendation, pt is to see hepatology sooner than biopsy appt 4/3/18.  I have left that message as well.  CAYLA Lee RN, BSN  Interventional Radiology Care Coordinator   Phone:  [phone]

## 2018-03-19 NOTE — PATIENT INSTRUCTIONS
Preventive Care:    Colorectal Cancer Screening: During our visit today, we discussed that it is recommended you receive colorectal cancer screening. Please call or make an appointment with your primary care provider to discuss this. You may also call the [facility] scheduling line ([phone]) to set up a colonoscopy appointment.

## 2018-03-19 NOTE — MR AVS SNAPSHOT
After Visit Summary   3/19/2018    Naseem Ferrer    MRN: [id number]           Patient Information     Date Of Birth          1955        Visit Information        Provider Department      3/19/2018 1:30 PM Dacia Juan MD [facility] Hepatology        Today's Diagnoses     Hepatitis C virus infection, unspecified chronicity    -  1    Liver mass          Care Instructions    Preventive Care:    Colorectal Cancer Screening: During our visit today, we discussed that it is recommended you receive colorectal cancer screening. Please call or make an appointment with your primary care provider to discuss this. You may also call the [facility] scheduling line ([phone]) to set up a colonoscopy appointment.                Follow-ups after your visit        Additional Services     Onc/Heme Adult Referral       Do not schedule before April 6. He is having biopsy of mass on 4/3/18                  Follow-up notes from your care team     Return in about 3 months (around 6/19/2018).      Your next 10 appointments already scheduled     Apr 03, 2018  7:30 AM CDT   Procedure 4.5 hour with U2A ROOM 6   Unit 2A [facility] ([facility])    [address] [place] [id number]               Apr 03, 2018  9:00 AM CDT   (Arrive by 8:45 AM)   IR LIVER BIOPSY PERCUTANEOUS with UUIR6   [facility], [place], Interventional Radiology ([facility])    [address] [facility] [id number] [phone]           1. Laboratory test are to be obtained by your doctor prior to the exam (CBCP, INR and PTT) 2. Someone will need to drive you to and from the hospital. 3. If you are or may be pregnant, contact your doctor or a Radiology nurse prior to the day of the exam. 4. If you have diabetes, check with your doctor or a Radiology nurse to see if your insulin needs to be adjusted for the exam. 5. If you are  taking Coumadin (to thin you blood) please contact your doctor or a Radiology nurse at least 3 days before the exam for special instructions. 6. The day before your exam you may eat your regular diet and are encouraged to drink at least 2 quarts of clear liquids. Drink no alcoholic beverages for 24 hours prior to the exam. 7. Do not eat any solid food or milk products for 6 hours prior to the exam. You may drink clear liquids until 2 hours prior to the exam. Clear liquids include the following: water, Jell-O, clear broth, apple juice or any noncarbonated drink that you can see through (no pop!) 8. The morning of the exam you may brush your teeth and take medications as directed with a sip of water. 9. Tell the Radiology nurse if you have any allergies. 10. You will be asked to empty your bladder before the exam begins. 11. You may resume your normal activities the day after the exam. Do not do any strenuous exercises or lifting for 48 hours. 12. If a drainage tube is to remain in place, your doctor s office will need to make arrangements for you to learn how to care for this tube. Ask them about this before the date of the exam. You may need to obtain supplies from your local pharmacy. Please make an appointment before leaving your current appointment. You should understand the plan for your care prior to leaving this appointment              Who to contact     If you have questions or need follow up information about today's clinic visit or your schedule please contact [facility] HEPATOLOGY directly at [phone].  Normal or non-critical lab and imaging results will be communicated to you by MyChart, letter or phone within 4 business days after the clinic has received the results. If you do not hear from us within 7 days, please contact the clinic through Inspir[employer] or phone. If you have a critical or abnormal lab result, we will notify you by phone as soon as possible.  Submit refill requests through [employer] or call  "your pharmacy and they will forward the refill request to us. Please allow 3 business days for your refill to be completed.          Additional Information About Your Visit        Care EveryWhere ID     This is your Care EveryWhere ID. This could be used by other organizations to access your [place] medical records  [id number]        Your Vitals Were     Pulse Temperature Respirations Height Pulse Oximetry BMI (Body Mass Index)    95 97.7  F (36.5  C) (Oral) 18 1.6 m (5' 3\") 95% 24.66 kg/m2       Blood Pressure from Last 3 Encounters:   03/19/18 149/87   03/13/18 141/83   02/15/18 133/82    Weight from Last 3 Encounters:   03/19/18 63.1 kg (139 lb 3.2 oz)   02/15/18 66.7 kg (147 lb)   02/04/18 66.5 kg (146 lb 9.7 oz)              We Performed the Following     Onc/Heme Adult Referral          Today's Medication Changes          These changes are accurate as of 3/19/18  2:37 PM.  If you have any questions, ask your nurse or doctor.               Stop taking these medicines if you haven't already. Please contact your care team if you have questions.     QUEtiapine 25 MG tablet   Commonly known as:  SEROquel   Stopped by:  Dacia Juan MD                    Primary Care Provider Office Phone # Fax #    Jonna Clements [phone]       [facility] [address] [place] [address]        Equal Access to Services     [facility] HospitalBETSY AH: Hadii norma levineo Sosrinivas, waaxda luqadaha, qaybta kaalmada adeegyada, waxay oksana reyes adeileana garcia . So [facility] [phone].    ATENCIÓN: Si habla español, tiene a bhagat disposición servicios gratuitos de asistencia lingüística. Estelle al [phone].    We comply with applicable federal civil rights laws and [place] laws. We do not discriminate on the basis of race, color, national origin, age, disability, sex, sexual orientation, or gender identity.            Thank you!     Thank you for choosing [facility] HEPATOLOGY  for your care. " Our goal is always to provide you with excellent care. Hearing back from our patients is one way we can continue to improve our services. Please take a few minutes to complete the written survey that you may receive in the mail after your visit with us. Thank you!             Your Updated Medication List - Protect others around you: Learn how to safely use, store and throw away your medicines at www.disposemymeds.org.          This list is accurate as of 3/19/18  2:37 PM.  Always use your most recent med list.                   Brand Name Dispense Instructions for use Diagnosis    albuterol 108 (90 BASE) MCG/ACT Inhaler    PROAIR HFA/PROVENTIL HFA/VENTOLIN HFA     Inhale 2 puffs into the lungs every 4 hours as needed for shortness of breath / dyspnea or wheezing        aspirin 81 MG tablet      Take 81 mg by mouth daily        fluticasone 50 MCG/ACT spray    FLONASE     Spray 2 sprays into both nostrils daily        fluticasone-salmeterol 250-50 MCG/DOSE diskus inhaler    ADVAIR    1 Inhaler    Inhale 1 puff into the lungs 2 times daily    Chronic obstructive pulmonary disease, unspecified COPD type (H)       ipratropium - albuterol 0.5 mg/2.5 mg/3 mL 0.5-2.5 (3) MG/3ML neb solution    DUONEB    360 mL    Take 1 vial (3 mLs) by nebulization 4 times daily    Chronic obstructive pulmonary disease, unspecified COPD type (H)       loratadine 10 MG tablet    CLARITIN    30 tablet    Take 1 tablet (10 mg) by mouth daily    Chronic obstructive pulmonary disease, unspecified COPD type (H), Acute seasonal allergic rhinitis, unspecified trigger       thiamine 100 MG tablet     30 tablet    Take 1 tablet (100 mg) by mouth daily    Alcohol abuse

## 2018-03-19 NOTE — LETTER
3/19/2018       RE: Naseem Ferrer  [address] [place] [address]     Dear Colleague,    Thank you for referring your patient, Naseem Ferrer, to the [facility] HEPATOLOGY at [facility]. Please see a copy of my visit note below.    REASON FOR CONSULTATION: liver mass  REFERRING PROVIDER: PCP: Dr. Jonna Clements,   Dr. Chan Connelly, Dr. Mic Renner    A/P  Naseem Ferrer is a 63 year old male with a large and hemorrhagic liver mass, no definitive diagnosis but very likely cancerous. He has underlying HCV and heavy alcohol use, although there are no lab or imaging features of cirrhosis. Plan for biopsy 4/3/18. He is not a candidate for resection or liver transplant, given his severe COPD.     No plans to treat his HCV at this moment given the likely cancer.    Explained my role in his care. Once biopsy is returned will direct next steps, but will need oncology, so will put in referral now so he can be scheduled ahead of time.    Dacia Juan MD  Hepatology/Liver Transplant  Medical Director, Liver Transplantation  [facility]  Subjective  Naseem Ferrer is a 63 year old male referred for liver mass. Here with a friend named Erica. Wife is home.     He was admitted here on 1/2/8/18 with abdominal pain and found to have a large liver lesion, with hemoglobin of 9 (16 baseline). He was found to be bleeding from the hepatic mass. He required intubation and resuscitation. He underwent bland embolization on 1/29/18. Post-discharge, he saw Dr. Connelly on 2/15/18 for consideration of resection. Due to the extensive nature of the surgery and his severe COPD, Dr. Connelly deemed him not to be a surgical candidate.    He saw Dr. Mic Renner in  on 3/13/18, who scheduled a biopsy.     During his hospitalization, he was not seen by hepatology and has not seen a hepatologist until our visit today. He tested positive for HCV antibody on 2/2/18. Viral load 1.9 million  "on 3/6/18. He also has documented alcohol abuse but denies this. In the 1970s he snorted cocaine. He states he has had no alcohol since his hospitalization.     Current labs  ALT 51    Tbili  0.6  Alk phos   INR 0.89  Albumin 3.3  Platelets 290  Creatinine 0.54      HCV pos Vonnie.   HBV s Ag neg  A1AT neg  ASHLEIGH None recorded  ASMA None recorded  AMA None recorded  Iron panel None recorded  CEA 2.8  Ca19-9 18    Risk factors for fatty liver disease: none    PMH  COPD. Severe. Quit smoking 18. Getting pulmonary rehab. On 6L O2  Depression  HTN  Stress echo done recently: normal  L rotator cuff tear  Social History  Smoking as above  ETOH: Quit when he was admitted. Weekends he drank beer and rum and coke. 6-8 beers on weekends sometimes. Denies heavier use.    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 Smoker  3 dtrs 34, 33. 8 year old at home also  ROS: 10 point ROS neg other than the symptoms noted above in the HPI.  Exam  /87 (BP Location: Right arm, Patient Position: Sitting, Cuff Size: Adult Regular)  Pulse 95  Temp 97.7  F (36.5  C) (Oral)  Resp 18  Ht 1.6 m (5' 3\")  Wt 63.1 kg (139 lb 3.2 oz)  SpO2 95%  BMI 24.66 kg/m2  Constitutional: alert and no distress.   Neck: Neck supple. No adenopathy. Thyroid symmetric, normal size  HEENT:Normocephalic. No masses, lesions, tenderness or abnormalities. No temporal muscle wasting ENT exam normal, no neck nodes or sinus tenderness. No oral lesions. Upper dentures  Cardiovascular: negative, No lifts, heaves, or thrills. RRR. No murmurs, clicks gallops or rub  Respiratory: On O2. Decreased breath sound B.   Gastrointestinal: Abdomen soft, non-tender. BS normal.  No masses, organomegaly  Skin: no suspicious lesions or rashes. No spider angiomata or palmar erythema. Nails normal.  Neurologic: Alert and oriented x3. No " asterixis or tremor. Gait normal.   Psychiatric:  Appropriate, well groomed.  Hematologic/Lymphatic/Immunologic: Normal cervical and supraclavicular  lymph nodes        Again, thank you for allowing me to participate in the care of your patient.      Sincerely,    Dacia Juan MD

## 2018-03-19 NOTE — PROGRESS NOTES
REASON FOR CONSULTATION: liver mass  REFERRING PROVIDER: PCP: Dr. Jonna Clements,   Dr. Chan Connelly, Dr. Mic Renner    A/P  Naseem Ferrer is a 63 year old male with a large and hemorrhagic liver mass, no definitive diagnosis but very likely cancerous. He has underlying HCV and heavy alcohol use, although there are no lab or imaging features of cirrhosis. Plan for biopsy 4/3/18. He is not a candidate for resection or liver transplant, given his severe COPD.     No plans to treat his HCV at this moment given the likely cancer.    Explained my role in his care. Once biopsy is returned will direct next steps, but will need oncology, so will put in referral now so he can be scheduled ahead of time.    Dacia Juan MD  Hepatology/Liver Transplant  Medical Director, Liver Transplantation  [facility]  Subjective  Naseem Ferrer is a 63 year old male referred for liver mass. Here with a friend named Erica. Wife is home.     He was admitted here on 1/2/8/18 with abdominal pain and found to have a large liver lesion, with hemoglobin of 9 (16 baseline). He was found to be bleeding from the hepatic mass. He required intubation and resuscitation. He underwent bland embolization on 1/29/18. Post-discharge, he saw Dr. Connelly on 2/15/18 for consideration of resection. Due to the extensive nature of the surgery and his severe COPD, Dr. Connelly deemed him not to be a surgical candidate.    He saw Dr. Mic Renner in  on 3/13/18, who scheduled a biopsy.     During his hospitalization, he was not seen by hepatology and has not seen a hepatologist until our visit today. He tested positive for HCV antibody on 2/2/18. Viral load 1.9 million on 3/6/18. He also has documented alcohol abuse but denies this. In the 1970s he snorted cocaine. He states he has had no alcohol since his hospitalization.     Current labs  ALT 51    Tbili  0.6  Alk phos   INR 0.89  Albumin 3.3  Platelets 290  Creatinine 0.54      HCV pos  "Vonnie.   HBV s Ag neg  A1AT neg  ASHLEIGH None recorded  ASMA None recorded  AMA None recorded  Iron panel None recorded  CEA 2.8  Ca19-9 18    Risk factors for fatty liver disease: none    PMH  COPD. Severe. Quit smoking 18. Getting pulmonary rehab. On 6L O2  Depression  HTN  Stress echo done recently: normal  L rotator cuff tear  Social History  Smoking as above  ETOH: Quit when he was admitted. Weekends he drank beer and rum and coke. 6-8 beers on weekends sometimes. Denies heavier use.    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 Smoker  3 dtrs 34, 33. 8 year old at home also  ROS: 10 point ROS neg other than the symptoms noted above in the HPI.  Exam  /87 (BP Location: Right arm, Patient Position: Sitting, Cuff Size: Adult Regular)  Pulse 95  Temp 97.7  F (36.5  C) (Oral)  Resp 18  Ht 1.6 m (5' 3\")  Wt 63.1 kg (139 lb 3.2 oz)  SpO2 95%  BMI 24.66 kg/m2  Constitutional: alert and no distress.   Neck: Neck supple. No adenopathy. Thyroid symmetric, normal size  HEENT:Normocephalic. No masses, lesions, tenderness or abnormalities. No temporal muscle wasting ENT exam normal, no neck nodes or sinus tenderness. No oral lesions. Upper dentures  Cardiovascular: negative, No lifts, heaves, or thrills. RRR. No murmurs, clicks gallops or rub  Respiratory: On O2. Decreased breath sound B.   Gastrointestinal: Abdomen soft, non-tender. BS normal.  No masses, organomegaly  Skin: no suspicious lesions or rashes. No spider angiomata or palmar erythema. Nails normal.  Neurologic: Alert and oriented x3. No asterixis or tremor. Gait normal.   Psychiatric:  Appropriate, well groomed.  Hematologic/Lymphatic/Immunologic: Normal cervical and supraclavicular  lymph nodes      "

## 2018-03-19 NOTE — NURSING NOTE
"Chief Complaint   Patient presents with     Consult     Liver Mass       Initial /87 (BP Location: Right arm, Patient Position: Sitting, Cuff Size: Adult Regular)  Pulse 95  Temp 97.7  F (36.5  C) (Oral)  Resp 18  Ht 1.6 m (5' 3\")  Wt 63.1 kg (139 lb 3.2 oz)  SpO2 95%  BMI 24.66 kg/m2 Estimated body mass index is 24.66 kg/(m^2) as calculated from the following:    Height as of this encounter: 1.6 m (5' 3\").    Weight as of this encounter: 63.1 kg (139 lb 3.2 oz).  Medication Reconciliation: complete     Alma Baeza [facility]  3/19/2018 1:33 PM        "

## 2018-04-03 NOTE — PROGRESS NOTES
Patient Name: Naseem Ferrer  Medical Record Number: [id number]  Today's Date: 4/3/2018    Procedure: Liver Mass Biopsy  Proceduralist: Dr. Devon Ernst and Dr. Mic Renner    Sedation start time: 1020  Sedation end time: 1145  Sedation medications administered: Fentanyl 100 mcg, Versed 2 mg  Total sedation time: 85 minutes    Procedure start time: 1020  Puncture time: 1022  Procedure end time: 1145    Report given to: WYATT Olivia 2A    Other Notes: Pt arrived to IR Holding Room #6 from Unit 2A. Consent confirmed with patient. Pt denies any questions or concerns regarding procedure. Pt positioned supine and monitored per protocol. Liver mass biopsy done. Cores x 5 done, and collected by surgical pathology at bedside. Pt tolerated procedure without any noted complications. Pt transferred back to Unit 2A.

## 2018-04-03 NOTE — PROGRESS NOTES
Patient prepped and ready for procedure. Consent completed. Labs completed two weeks ago - see Epic.

## 2018-04-03 NOTE — IP AVS SNAPSHOT
[address] [address]                                       After Visit Summary   4/3/2018    Naseem Ferrer    MRN: [id number]           After Visit Summary Signature Page     I have received my discharge instructions, and my questions have been answered. I have discussed any challenges I see with this plan with the nurse or doctor.    ..........................................................................................................................................  Patient/Patient Representative Signature      ..........................................................................................................................................  Patient Representative Print Name and Relationship to Patient    ..................................................               ................................................  Date                                            Time    ..........................................................................................................................................  Reviewed by Signature/Title    ...................................................              ..............................................  Date                                                            Time

## 2018-04-03 NOTE — DISCHARGE INSTRUCTIONS
[facility]    Interventional Radiology  Patient Instructions Following Biopsy    AFTER YOU GO HOME  ? If you were given sedation DO NOT drive or operate machinery at home or at work for at least 24 hours  ? DO relax and take it easy for 48 hours, no strenuous activity for 24 hours  ? DO drink plenty of fluids  ? DO resume your regular diet, unless otherwise instructed by your Primary Physician  ? Keep the dressing dry and in place for 24 hours.  ? DO NOT SMOKE FOR AT LEAST 24 HOURS, if you have been given any medications that were to help you relax or sedate you during your procedure  ? DO NOT drink alcoholic beverages the day of your procedure  ? DO NOT do any strenuous exercise or lifting (> 10 lbs) for at least 7 days following your procedure  ? DO NOT take a bath or shower for at least 12 hours following your procedure  ? Remove dressing after shower the next day. Replace with Band aid for 2 days.  Never leave a wet dressing in place.  ? DO NOT make any important or legal decisions for 24 hours following your procedure  ? There should be minimum drainage from the biopsy site    CALL THE PHYSICIAN IF:  ? You start bleeding from the procedure site.  If you do start to bleed from that site, lie down flat and hold pressure on the site for a minimum of 10 minutes.  Your physician will tell you if you need to return to the hospital  ? You develop nausea or vomiting  ? You have excessive swelling, redness, or tenderness at the site  ? You have drainage that looks like it is infected.  ? You experience severe pain  ? You develop hives or a rash or unexplained itching  ? You develop shortness of breath  ? You develop a temperature of 101 degrees F or greater  ? You develop bloody clots or red urine after you are discharged  ? You develop chest pain or cough up blood, lightheadedness or fainting    ADDITIONAL INSTRUCTIONS  If you are taking Coumadin, restart tonight.  Follow up with your Coumadin  Clinic or Primary Care MD to have your INR rechecked.    [facility] INTERVENTIONAL RADIOLOGY DEPARTMENT  Procedure Physician:         Mic Renner MD                                   Date of procedure: April 3, 2018  Telephone Numbers: [phone] Monday-Friday 8:00 am to 4:30 pm  [phone] After 4:30 pm Monday-Friday, Weekends & Holidays.   Ask for the Interventional Radiologist on call.  Someone is on call 24 hrs/day  [facility] toll free number: [phone] Monday-Friday 8:00 am to 4:30 pm  [facility] Emergency Dept: [phone]

## 2018-04-03 NOTE — PROGRESS NOTES
Interventional Radiology Pre-Procedure Sedation Assessment   Time of Assessment: 8:04 AM    Expected Level: Moderate Sedation    Indication: Sedation is required for the following type of Procedure: Biopsy    Sedation and procedural consent: Risks, benefits and alternatives were discussed with Patient    PO Intake: Appropriately NPO for procedure    ASA Class: Class 2 - MILD SYSTEMIC DISEASE, NO ACUTE PROBLEMS, NO FUNCTIONAL LIMITATIONS.    Mallampati: Grade 3:  Soft palate visible, posterior pharyngeal wall not visible    Lungs: Lungs Clear with good breath sounds bilaterally    Heart: Normal heart sounds and rate    History and physical reviewed and no updates needed. I have reviewed the lab findings, diagnostic data, medications, and the plan for sedation. I have determined this patient to be an appropriate candidate for the planned sedation and procedure and have reassessed the patient IMMEDIATELY PRIOR to sedation and procedure.    uHber Ernst MD

## 2018-04-03 NOTE — PROGRESS NOTES
Discharge instructions given and pt voiced understanding. No scripts needed from pharmacy. Right upper abdominal site is soft and flat. No hematoma. Up walking in room. Ate well for lunch. Urinating adequate amounts. No complaint of discomfort. Adequate for discharge. Discharge to home with family.

## 2018-04-03 NOTE — BRIEF OP NOTE
Interventional Radiology Brief Post Procedure Note    Procedure:     Proceduralist: Mic Renner MD    Assistant: Huber Ernst MD    Time Out: Prior to the start of the procedure and with procedural staff participation, I verbally confirmed the patient s identity using two indicators, relevant allergies, that the procedure was appropriate and matched the consent or emergent situation, and that the correct equipment/implants were available. Immediately prior to starting the procedure I conducted the Time Out with the procedural staff and re-confirmed the patient s name, procedure, and site/side. (The Joint Commission universal protocol was followed.)  Yes        Sedation: IR Nurse Monitored Care   Post Procedure Summary:  Prior to the start of the procedure and with procedural staff participation, I verbally confirmed the patient s identity using two indicators, relevant allergies, that the procedure was appropriate and matched the consent or emergent situation, and that the correct equipment/implants were available. Immediately prior to starting the procedure I conducted the Time Out with the procedural staff and re-confirmed the patient s name, procedure, and site/side. (The Joint Commission universal protocol was followed.)  Yes       Sedatives: Fentanyl and Midazolam (Versed)    Vital signs, airway and pulse oximetry were monitored and remained stable throughout the procedure and sedation was maintained until the procedure was complete.  The patient was monitored by staff until sedation discharge criteria were met.    Patient tolerance: Patient tolerated the procedure well with no immediate complications.    Time of sedation in minutes: 30 Minutes minutes from beginning to end of physician one to one monitoring.          Findings: large right liver mass biopsy    Estimated Blood Loss: Minimal    Fluoroscopy Time:  minute(s)    SPECIMENS: Core needle biopsy specimens sent for pathological  analysis    Complications: 1. None     Condition: Stable    Plan: 2a. 1 hr bed rest.    Comments: See dictated procedure note for full details.    Huber Ernst MD

## 2018-04-03 NOTE — IP AVS SNAPSHOT
MRN:[id number]                      After Visit Summary   4/3/2018    Naseem Ferrer    MRN: [id number]           Visit Information        Department      4/3/2018  7:43 AM [address] [facility]          Review of your medicines      UNREVIEWED medicines. Ask your doctor about these medicines        Dose / Directions    albuterol 108 (90 BASE) MCG/ACT Inhaler   Commonly known as:  PROAIR HFA/PROVENTIL HFA/VENTOLIN HFA        Dose:  2 puff   Inhale 2 puffs into the lungs every 4 hours as needed for shortness of breath / dyspnea or wheezing   Refills:  0       aspirin 81 MG tablet        Dose:  81 mg   Take 81 mg by mouth daily   Refills:  0       fluticasone 50 MCG/ACT spray   Commonly known as:  FLONASE   Indication:  Allergic Rhinitis        Dose:  2 spray   Spray 2 sprays into both nostrils daily   Refills:  0       fluticasone-salmeterol 250-50 MCG/DOSE diskus inhaler   Commonly known as:  ADVAIR   Used for:  Chronic obstructive pulmonary disease, unspecified COPD type (H)        Dose:  1 puff   Inhale 1 puff into the lungs 2 times daily   Quantity:  1 Inhaler   Refills:  0       ipratropium - albuterol 0.5 mg/2.5 mg/3 mL 0.5-2.5 (3) MG/3ML neb solution   Commonly known as:  DUONEB   Used for:  Chronic obstructive pulmonary disease, unspecified COPD type (H)        Dose:  1 vial   Take 1 vial (3 mLs) by nebulization 4 times daily   Quantity:  360 mL   Refills:  0       loratadine 10 MG tablet   Commonly known as:  CLARITIN   Used for:  Chronic obstructive pulmonary disease, unspecified COPD type (H), Acute seasonal allergic rhinitis, unspecified trigger        Dose:  10 mg   Take 1 tablet (10 mg) by mouth daily   Quantity:  30 tablet   Refills:  0       thiamine 100 MG tablet   Used for:  Alcohol abuse        Dose:  100 mg   Take 1 tablet (100 mg) by mouth daily   Quantity:  30 tablet   Refills:  0                Protect others around you: Learn how to safely use, store and throw away your  medicines at www.disposemymeds.org.         Follow-ups after your visit        Your next 10 appointments already scheduled     Jun 11, 2018 10:30 AM CDT   Lab with  LAB   [facility] ([facility])    [address]  1st Floor  [facility] [id number] [phone]            Jun 11, 2018 11:30 AM CDT   (Arrive by 11:15 AM)   Return General Liver with Dacia Juan MD   [facility] Hepatology ([facility])    [address]  [address]  [facility] [id number] [phone]               Care Instructions        Further instructions from your care team       [facility]    Interventional Radiology  Patient Instructions Following Biopsy    AFTER YOU GO HOME  ? If you were given sedation DO NOT drive or operate machinery at home or at work for at least 24 hours  ? DO relax and take it easy for 48 hours, no strenuous activity for 24 hours  ? DO drink plenty of fluids  ? DO resume your regular diet, unless otherwise instructed by your Primary Physician  ? Keep the dressing dry and in place for 24 hours.  ? DO NOT SMOKE FOR AT LEAST 24 HOURS, if you have been given any medications that were to help you relax or sedate you during your procedure  ? DO NOT drink alcoholic beverages the day of your procedure  ? DO NOT do any strenuous exercise or lifting (> 10 lbs) for at least 7 days following your procedure  ? DO NOT take a bath or shower for at least 12 hours following your procedure  ? Remove dressing after shower the next day. Replace with Band aid for 2 days.  Never leave a wet dressing in place.  ? DO NOT make any important or legal decisions for 24 hours following your procedure  ? There should be minimum drainage from the biopsy site    CALL THE PHYSICIAN IF:  ? You start bleeding from the procedure site.  If you do start to bleed from that site, lie down flat and hold pressure on the site for a minimum of 10 minutes.   "Your physician will tell you if you need to return to the hospital  ? You develop nausea or vomiting  ? You have excessive swelling, redness, or tenderness at the site  ? You have drainage that looks like it is infected.  ? You experience severe pain  ? You develop hives or a rash or unexplained itching  ? You develop shortness of breath  ? You develop a temperature of 101 degrees F or greater  ? You develop bloody clots or red urine after you are discharged  ? You develop chest pain or cough up blood, lightheadedness or fainting    ADDITIONAL INSTRUCTIONS  If you are taking Coumadin, restart tonight.  Follow up with your Coumadin Clinic or Primary Care MD to have your INR rechecked.    [facility] INTERVENTIONAL RADIOLOGY DEPARTMENT  Procedure Physician:         Mci Renner MD                                   Date of procedure: April 3, 2018  Telephone Numbers: [phone] Monday-Friday 8:00 am to 4:30 pm  [phone] After 4:30 pm Monday-Friday, Weekends & Holidays.   Ask for the Interventional Radiologist on call.  Someone is on call 24 hrs/day  [facility] toll free number: [phone] Monday-Friday 8:00 am to 4:30 pm  [facility] Emergency Dept: [phone]           Additional Information About Your Visit        Care EveryWhere ID     This is your Care EveryWhere ID. This could be used by other organizations to access your [place] medical records  [id number]        Your Vitals Were     Blood Pressure Pulse Respirations Height Weight Pulse Oximetry    144/81 (BP Location: Right arm) 77 16 1.626 m (5' 4\") 60.8 kg (134 lb 0.6 oz) 95%    BMI (Body Mass Index)                   23.01 kg/m2            Primary Care Provider Office Phone # Fax #    Jonna Clemnets [phone]      Equal Access to Services     [facility] CampusBET[facility] : Susie Saavedra, wanadiya matta, qaybrandolph kaelidia acevedo, aure bang. [facility] [phone].    ATENCIÓN: Si jaylala español, wilmar a bhagat " disposición servicios gratuitos de asistencia lingüística. Estelle meneses [phone].    We comply with applicable federal civil rights laws and [place] laws. We do not discriminate on the basis of race, color, national origin, age, disability, sex, sexual orientation, or gender identity.            Thank you!     Thank you for choosing [facility] for your care. Our goal is always to provide you with excellent care. Hearing back from our patients is one way we can continue to improve our services. Please take a few minutes to complete the written survey that you may receive in the mail after you visit with us. Thank you!             Medication List: This is a list of all your medications and when to take them. Check marks below indicate your daily home schedule. Keep this list as a reference.      Medications           Morning Afternoon Evening Bedtime As Needed    albuterol 108 (90 BASE) MCG/ACT Inhaler   Commonly known as:  PROAIR HFA/PROVENTIL HFA/VENTOLIN HFA   Inhale 2 puffs into the lungs every 4 hours as needed for shortness of breath / dyspnea or wheezing                                aspirin 81 MG tablet   Take 81 mg by mouth daily                                fluticasone 50 MCG/ACT spray   Commonly known as:  FLONASE   Spray 2 sprays into both nostrils daily                                fluticasone-salmeterol 250-50 MCG/DOSE diskus inhaler   Commonly known as:  ADVAIR   Inhale 1 puff into the lungs 2 times daily                                ipratropium - albuterol 0.5 mg/2.5 mg/3 mL 0.5-2.5 (3) MG/3ML neb solution   Commonly known as:  DUONEB   Take 1 vial (3 mLs) by nebulization 4 times daily                                loratadine 10 MG tablet   Commonly known as:  CLARITIN   Take 1 tablet (10 mg) by mouth daily                                thiamine 100 MG tablet   Take 1 tablet (100 mg) by mouth daily

## 2018-04-09 NOTE — TELEPHONE ENCOUNTER
Wife calling to further inquire on path results regarding liver mass biopsy that was completed last week.     I informed her that at this time, path results indicate inflammation, however will pass a msg along to Dr. Juan,  who was the referring provider on this request and better interpret the results or look further into this.      She will also be able to direct further plan of care.     Pt's wife agrees to plan.     *note routed to Dr Juan.     Filomena Santiago RN, BSN  Interventional Radiology Nurse Coordinator   Phone: [phone]

## 2018-04-10 NOTE — TELEPHONE ENCOUNTER
Writer informed wife Miya that pt's results will be discussed in tumor conference this Friday. Wife wanted more details but unfortunately did not have any to give her. Wife and pt would like to know the end results ASAP. QUIANA.

## 2018-04-10 NOTE — TELEPHONE ENCOUNTER
Spoke with Dr. Renner today regarding future treatment and imaging. He will call patient and we will review at tumor conference.

## 2018-04-11 NOTE — TELEPHONE ENCOUNTER
I called and spoke with Miya, she is asking questions about Ixonias biopsy results.  Dr. Renner has been updated and has been in contact with Dr. Juan and contacted patient.  Plan if for patient to be brought up at Liver tumor conference this week Friday.    CAYLA Lee RN, BSN  Interventional Radiology Care Coordinator   Phone:  [phone]

## 2018-04-17 NOTE — TELEPHONE ENCOUNTER
I called and spoke with Miya, I have ordered a Liver MRI, She states he is doing better from a breathing aspect.  He is on the oxymeter, and is satting 94 % on room air and much less SOB.  She is going to schedule him for the Liver MRI this week.   CAYLA Lee RN, BSN  Interventional Radiology Care Coordinator   Phone:  [phone]

## 2018-05-02 NOTE — TELEPHONE ENCOUNTER
I called and spoke with both Naseem and Miya.  Pt has been updated that his case was discussed at liver tumor conference 4/30/18 and it was decided that patient have a repeat biopsy of his liver mass.  I have pt scheduled for this biopsy with Dr. Renner on 5/9/18 @ 11 am.  Pt is aware of details, NPO, 9:30 check in to gold waiting room at [facility] and ok to take am meds including aspirin.  CAYLA Lee RN, BSN  Interventional Radiology Care Coordinator   Phone:  [phone]

## 2018-05-09 PROBLEM — R16.0 LIVER MASS: Status: ACTIVE | Noted: 2018-01-01

## 2018-05-09 NOTE — PROCEDURES
Interventional Radiology Brief Post Procedure Note    Procedure: u/s guided omental/peritoneal nodule biopsy.     Proceduralist: See Ponce MD    Assistant: None    Time Out: Prior to the start of the procedure and with procedural staff participation, I verbally confirmed the patient s identity using two indicators, relevant allergies, that the procedure was appropriate and matched the consent or emergent situation, and that the correct equipment/implants were available. Immediately prior to starting the procedure I conducted the Time Out with the procedural staff and re-confirmed the patient s name, procedure, and site/side. (The Joint Commission universal protocol was followed.)  Yes    Medications   Medication Event Details Admin User Admin Time       Sedation: IR Nurse Monitored Care   Post Procedure Summary:  Prior to the start of the procedure and with procedural staff participation, I verbally confirmed the patient s identity using two indicators, relevant allergies, that the procedure was appropriate and matched the consent or emergent situation, and that the correct equipment/implants were available. Immediately prior to starting the procedure I conducted the Time Out with the procedural staff and re-confirmed the patient s name, procedure, and site/side. (The Joint Commission universal protocol was followed.)  Yes       Sedatives: Fentanyl and Midazolam (Versed)    Vital signs, airway and pulse oximetry were monitored and remained stable throughout the procedure and sedation was maintained until the procedure was complete.  The patient was monitored by staff until sedation discharge criteria were met.    Patient tolerance: Patient tolerated the procedure well with no immediate complications.    Time of sedation in minutes: 30 Minutes minutes from beginning to end of physician one to one monitoring.          Findings: uncomplicated biopsy of omental/peritoneal nodule with adequate tissue per initial  path review.  Liver lesion biopsy not performed.     Estimated Blood Loss: Minimal    Fluoroscopy Time:  minute(s)    SPECIMENS: Core needle biopsy specimens sent for pathological analysis    Complications: 1. None     Condition: Stable    Plan: routine post procedure monitoring.     Comments: See dictated procedure note for full details.    See Ponce MD

## 2018-05-09 NOTE — H&P
[facility], [place]    Internal Medicine History and Physical - Gold Service       Date of Admission: 5/9/2018    Assessment & Plan  Naseem Ferrer is a 63 year old man with a history of severe COPD on 6L home O2, HTN, depression, HCV and prior heavy alcohol use w/o evidence of cirrhosis, and large hemorrhagic liver mass (concerning for malignancy) s/p urgent embolization 1/29/18 and not a candidate for surgical resection who presented today for liver biopsy by IR. Post op course c/b concern for bleeding prompting urgent embolization. Now admitted to [facility] for close monitoring of hemodynamics and respiratory status.     #Liver Mass s/p Biopsy w/ Intraperitoneal Hemorrhage s/p Embolization. Per prior OP hepatology and surgical oncology notes, there is high suspicion that his large hepatic mass is malignancy (possible HCC w/ underlying HCV) but was not a candidate for surgical resection as it would require a formal right hepatectomy which would likely result in reactive right pleural effusion and > 50% risk of post op respiratory failure w/ severe underlying COPD. Previously underwent urgent IR embolization on 1/29/18 when there was lab and radiographic evidence of tumor rupture w/ intraperitoneal hemorrhage. No LFTs on file today. Was having post op RUQ abdominal pain w/ some radiation to left side despite 75 mcg IV Fentanyl (for procedure) and 0.5 mg IV Dilaudid. Limited post procedure US showed small volume ascites. Became bradycardic to 56 and hypotensive to 88/40 --> 131/83 s/p 1L NS and [place] position, then became tachycardic and hgb 15.9-->13.9 prompting stat abd/pelvis CT which showed active extravasation from. Went for urgent embolization w/o source identified but had empiric embolization of the right inferior epigastric artery. Most recent VS w/ , BP 91/73.   - Admit to [facility].   - Strict flat bedrest x 6 hours (until 0100) d/t failure of left femoral closure device.    - IR will f/u in AM; call sooner if decompensation.   - Stat hgb now, additional trending pending results. Type and screen is done.   - Check CMP/Mg now (nothing on file since 3/19).   - Low threshold for additional IVF bolus. MAP currently 79.   - Pain control w/ low dose IV Dilaudid to start. Cautiously balancing w/ severe underlying COPD. Capnography monitoring ordered as below. Per IR, would not be unexpected if he were to end up requiring PCA as some supply to rectus/oblique musculature would have been affected (post-embolization pain is anticipated).   - Path results may still be pending at d/c. If malignancy is confirmed will need to meet w/ oncology, however may also benefit from early palliative care referral.     #Severe COPD. Seen by pulmonology 2/12/18. FEV1 0.5L. On 6L home O2 since January [facility] d/c when he required intubation (was hypercarbic w/ a PCO2 up to 83 at that time). Did quit smoking since then.   - Continue home O2, aggressive pulmonary toilet.   - Post op capnography monitoring and cautious use of opioids or other sedating meds.   - Continue home Advair and QID DuoNebs.   - Was recommended to have OP sleep study and chest CT for cancer screening. Was also supposed to start pulmonary rehab.     #HTN. Not on meds. /90 prior to procedure.     Diet: clears - advance as tolerated per IR   Fluids: NS @ 100 cc/hr  DVT Prophylaxis: mechanical   Code Status: full    #Pain Assessment:  Current Pain Score 5/9/2018   Patient currently in pain? denies   Pain score (0-10) -   Pain location -   Pain descriptors -   CPOT pain score -   - Naseem is experiencing pain due to post procedural bleeding. Pain management was discussed and the plan was created in a collaborative fashion.  Naseem's response to the current recommendations: engaged  - Opioid regimen: IV Dilaudid   - Response to opioid medications: Reduction of symptoms   - Bowel regimen: not needed    Disposition Plan   Expected  discharge: 1-2 days; recommended to prior living arrangement once hemodynamically stable and pain controlled on PO regimen     Brenda Stallings PA-C  Hospitalist Service  [facility]  Pager [phone]    Chief Complaint   Abdominal Pain     History is obtained from the patient    History of Present Illness   Patient presented to IR today for outpatient liver biopsy which was completed successfully. Post op he was noted to have worsening RUQ abdominal pain so was called for observation admission. Since that time he was noted to be diaphoretic, tachycardic, and hypotensive prompting RRT while down on 2A. He went for stat CT scan which showed concern for active extravasation. Then went for urgent embolization - no source identified but had empiric embolization of the right inferior epigastric artery. The left femoral closure device failed so he is on bedrest for the next 6 hours. Currently, patient reports that he is feeling ok. Pain is controlled. Has not voided yet. Breathing feels stable/at baseline. Wears 6L continuous O2 at home. Did not have sleep study yet. Reports that he has overall been doing pretty well at home without any significant abdominal pain or other complaints.     Review of Systems   10 point ROS performed and negative unless otherwise noted in HPI    Past Medical History    I have reviewed this patient's medical history and updated it with pertinent information if needed.   Past Medical History:   Diagnosis Date     COPD (chronic obstructive pulmonary disease) (H)     very severe. O2 dependent, h/o intubation. frequent exacerbations.     Depression      Hypertension      Liver lesion     liver mass concerning for HCC, dx not confirmed; adm with hemorrhage from lesion s/p embolization        Past Surgical History   I have reviewed this patient's surgical history and updated it with pertinent information if needed.  Past Surgical History:   Procedure Laterality Date     CARPAL  TUNNEL RELEASE RT/LT       ORTHOPEDIC SURGERY       ROTATOR CUFF REPAIR RT/LT          Social History   Social History   Substance Use Topics     Smoking status: Former Smoker     Packs/day: 1.00     Years: 50.00     Quit date: 1/29/2018     Smokeless tobacco: Never Used     Alcohol use No      Comment: Last drink Jan 27, 2018     Lives at home with wife who has an in-home .     Family History   I have reviewed this patient's family history and updated it with pertinent information if needed.   Family History   Problem Relation Age of Onset     LUNG DISEASE Mother      CANCER Father      unknown      CANCER Sister      lung       Prior to Admission Medications   Prior to Admission Medications   Prescriptions Last Dose Informant Patient Reported? Taking?   albuterol (PROAIR HFA/PROVENTIL HFA/VENTOLIN HFA) 108 (90 BASE) MCG/ACT Inhaler More than a month at Unknown time Self Yes No   Sig: Inhale 2 puffs into the lungs every 4 hours as needed for shortness of breath / dyspnea or wheezing   aspirin 81 MG tablet Past Week at Unknown time Self Yes Yes   Sig: Take 81 mg by mouth daily   fluticasone (FLONASE) 50 MCG/ACT spray Past Week at Unknown time Self Yes Yes   Sig: Spray 2 sprays into both nostrils daily   fluticasone-salmeterol (ADVAIR) 250-50 MCG/DOSE diskus inhaler More than a month at Unknown time Self No No   Sig: Inhale 1 puff into the lungs 2 times daily   ipratropium - albuterol 0.5 mg/2.5 mg/3 mL (DUONEB) 0.5-2.5 (3) MG/3ML neb solution 5/9/2018 at 0730 Self Yes Yes   Sig: Take 1 vial (3 mLs) by nebulization 4 times daily   loratadine (CLARITIN) 10 MG tablet 5/8/2018 at Unknown time Self No Yes   Sig: Take 1 tablet (10 mg) by mouth daily   thiamine 100 MG tablet More than a month at Unknown time Self No No   Sig: Take 1 tablet (100 mg) by mouth daily      Facility-Administered Medications: None     Allergies   Allergies   Allergen Reactions     Mold Unknown     Other reaction(s): Other  Inability to  breathe     Tramadol Unknown, Other (See Comments), Diarrhea, Rash and Nausea and Vomiting       Physical Exam   BP 91/73  Pulse 73  Temp 96  F (35.6  C) (Axillary)  Resp 20  Wt 63.7 kg (140 lb 6.4 oz)  SpO2 98%  BMI 24.1 kg/m2  GENERAL: Lying in bed, appears comfortable. Somnolent but easily arouses to voice and is oriented x 3. Converses appropriately.   HEENT: Anicteric sclera. Mucous membranes moist.   CV: RRR. S1, S2. No murmurs appreciated.   RESPIRATORY: Effort normal on 6L oxy mask. Lungs diminished throughout w/ scattered rhonchi and wheezing.   GI: Abdomen soft but distended. Mild generalized tenderness but no rebound or guarding.   NEUROLOGICAL: No focal deficits. Moves all extremities.   EXTREMITIES: No peripheral edema. Intact bilateral pedal pulses. Left groin site w/o bleeding, swelling, ecchymosis.   SKIN: No jaundice. No rashes.     Data   Data reviewed today: I reviewed all medications, new labs and imaging results over the last 24 hours.

## 2018-05-09 NOTE — PROGRESS NOTES
Became very diaphoretic on face starting at 1340.  BP stable.   Pain is moderate to severe soreness right abd and across to left abd.  Dr. Ponce here to evaluate at 1350.  No change in size or hardness of abdomen.  Dilaudid ordered & given now.  Ultrasound of abdomen ordered.

## 2018-05-09 NOTE — IP AVS SNAPSHOT
MRN:[id number]                      After Visit Summary   5/9/2018    Naseem Ferrer    MRN: [id number]           Thank you!     Thank you for choosing [facility] for your care. Our goal is always to provide you with excellent care. Hearing back from our patients is one way we can continue to improve our services. Please take a few minutes to complete the written survey that you may receive in the mail after you visit with us. Thank you!        Patient Information     Date Of Birth          1955        About your hospital stay     You were admitted on:  May 9, 2018 You last received care in the:  [address] [facility]    You were discharged on:  May 14, 2018        Reason for your hospital stay       Dear Mr. Ferrer,     Your were hospitalized at [facility] from 5/9/18 to 5/14 due to peritoneal hemorrhage following liver biopsy.  Your biopsy results returned and findings were consistent with metastatic liver cancer.  You underwent a CT scan of the chest to evaluate for lung metastases and were also found to have possible pneumonia that may have developed prior to admission.  You will be discharged with a 10 day course of Augmentin to treat the pneumonia.      You were seen by Oncology while admitted to the hospital due to the results of your liver biopsy. Further care going forward will be determined at your outpatient Oncology appointment.  You will be contacted by a  in the next 2-3 days to set up this appointment.  If you have not been contacted by a , please call [phone].  Currently you have an appointment w/ GI/Hepatology w/ Dr. Dacia Juan on 6/11/18.  Please be sure to attend this appointment.      It was a pleasure to care for you during this hospital admission.  Please let us know if there is anything else we can do for you so that we can be sure you are leaving completely satisfied with your care experience.    Your hospital  unit at the time of discharge is 6B so if you have any questions please call the hospital at [phone] and ask to talk to a nurse on that unit.    Take gentle care,    Sydney Joyce CNP  [facility] - Internal Medicine   Hospitalist Service                  Who to Call     For medical emergencies, please call 192.  For non-urgent questions about your medical care, please call your primary care provider or clinic, [phone]          Attending Provider     Provider Specialty    Mic Renner MD Radiology    Robert Synder MD Internal Medicine    Devin Isaac MD Internal Medicine       Primary Care Provider Office Phone # Fax #    Jonna Clements [phone]       When to contact your care team       Call your PCP or return to ED for temperatures > 100.7 degrees, worsening or changing pain, uncontrolled vomiting or inability to tolerate oral intake, new or worsening diarrhea, new or worsening shortness of breath, decreased urine output, yellowing of the eyes or skin, confusion, weakness, blood in urine or stools.                  After Care Instructions     Activity       Your activity upon discharge: activity as tolerated            Diet       Follow this diet upon discharge:  Regular Diet Adult            Oxygen Adult       Renew Home Oxygen Order  Renew previous prescription.  Expected treatment length is indefinite (99 months).    Attending Provider: Devin Isaac MD  Physician signature: See electronic signature associated with these discharge orders  Date of Order: May 14, 2018                  Follow-up Appointments     Adult [facility] Follow-up and recommended labs and tests       Follow up with primary care provider, Jonna Clements, within 7 days for hospital follow- up.  The following labs/tests are recommended: CBC, CMP.      Appointments on [facility] and/or [facility] (with [facility] or [facility] provider or service). Call [phone] if you haven't heard regarding  these appointments within 7 days of discharge.                  Your next 10 appointments already scheduled     Jun 11, 2018 10:30 AM CDT   Lab with  LAB   [facility] ([facility])    [address]  1st Floor  [place] [facility] [address] [phone]            Jun 11, 2018 11:30 AM CDT   (Arrive by 11:15 AM)   Return General Liver with Dacia Juan MD   [facility] Hepatology ([facility])    [address]  [address]  [place] [address] [phone]              Further instructions from your care team         ___________________________  [facility]  Phone  [phone]  Fax  [phone]  ___________________________    Pending Results     Date and Time Order Name Status Description    5/11/2018 1319 IR Visceral Angiogram Preliminary             Statement of Approval     Ordered          05/14/18 1330  I have reviewed and agree with all the recommendations and orders detailed in this document.  EFFECTIVE NOW     Approved and electronically signed by:  Sydney Joyce APRN CNP             Admission Information     Date & Time Provider Department Dept. Phone    5/9/2018 Devin Isaac MD [address] [facility] [phone]      Your Vitals Were     Blood Pressure Pulse Temperature Respirations Weight Pulse Oximetry    123/83 (BP Location: Right arm) 89 98.5  F (36.9  C) (Oral) 18 65 kg (143 lb 3.2 oz) 91%    BMI (Body Mass Index)                   24.58 kg/m2           Care EveryWhere ID     This is your Care EveryWhere ID. This could be used by other organizations to access your [facility] medical records  [id number]        Equal Access to Services     GETACHEW MICHEL : Susie Saavedra, adriel matta, aure barrett. [facility] [phone].    ATENCIÓN: Si habla español, tiene a bhagat disposición servicios gratuitos de asistencia lingüística.  Estelle meneses [phone].    We comply with applicable federal civil rights laws and [place] laws. We do not discriminate on the basis of race, color, national origin, age, disability, sex, sexual orientation, or gender identity.               Review of your medicines      START taking        Dose / Directions    amoxicillin-clavulanate 875-125 MG per tablet   Commonly known as:  AUGMENTIN   Indication:  Community Acquired Pneumonia   Used for:  Community acquired pneumonia, unspecified laterality        Dose:  1 tablet   Take 1 tablet by mouth every 12 hours for 8 days   Quantity:  16 tablet   Refills:  0         CONTINUE these medicines which have NOT CHANGED        Dose / Directions    albuterol 108 (90 Base) MCG/ACT Inhaler   Commonly known as:  PROAIR HFA/PROVENTIL HFA/VENTOLIN HFA        Dose:  2 puff   Inhale 2 puffs into the lungs every 4 hours as needed for shortness of breath / dyspnea or wheezing   Refills:  0       aspirin 81 MG tablet        Dose:  81 mg   Take 81 mg by mouth daily   Refills:  0       fluticasone 50 MCG/ACT spray   Commonly known as:  FLONASE   Indication:  Allergic Rhinitis        Dose:  2 spray   Spray 2 sprays into both nostrils daily   Refills:  0       fluticasone-salmeterol 250-50 MCG/DOSE diskus inhaler   Commonly known as:  ADVAIR   Used for:  Chronic obstructive pulmonary disease, unspecified COPD type (H)        Dose:  1 puff   Inhale 1 puff into the lungs 2 times daily   Quantity:  1 Inhaler   Refills:  0       ipratropium - albuterol 0.5 mg/2.5 mg/3 mL 0.5-2.5 (3) MG/3ML neb solution   Commonly known as:  DUONEB   Used for:  Chronic obstructive pulmonary disease, unspecified COPD type (H)        Dose:  1 vial   Take 1 vial (3 mLs) by nebulization 4 times daily   Quantity:  360 mL   Refills:  0       loratadine 10 MG tablet   Commonly known as:  CLARITIN   Used for:  Chronic obstructive pulmonary disease, unspecified COPD type (H), Acute seasonal allergic rhinitis, unspecified  trigger        Dose:  10 mg   Take 1 tablet (10 mg) by mouth daily   Quantity:  30 tablet   Refills:  0       thiamine 100 MG tablet   Used for:  Alcohol abuse        Dose:  100 mg   Take 1 tablet (100 mg) by mouth daily   Quantity:  30 tablet   Refills:  0            Where to get your medicines      These medications were sent to [facility] [address] [place] [address] [address] [address]     Phone:  [phone]     amoxicillin-clavulanate 875-125 MG per tablet                Protect others around you: Learn how to safely use, store and throw away your medicines at www.disposemymeds.org.        ANTIBIOTIC INSTRUCTION     You've Been Prescribed an Antibiotic - Now What?  Your healthcare team thinks that you or your loved one might have an infection. Some infections can be treated with antibiotics, which are powerful, life-saving drugs. Like all medications, antibiotics have side effects and should only be used when necessary. There are some important things you should know about your antibiotic treatment.      Your healthcare team may run tests before you start taking an antibiotic.    Your team may take samples (e.g., from your blood, urine or other areas) to run tests to look for bacteria. These test can be important to determine if you need an antibiotic at all and, if you do, which antibiotic will work best.      Within a few days, your healthcare team might change or even stop your antibiotic.    Your team may start you on an antibiotic while they are working to find out what is making you sick.    Your team might change your antibiotic because test results show that a different antibiotic would be better to treat your infection.    In some cases, once your team has more information, they learn that you do not need an antibiotic at all. They may find out that you don't have an infection, or that the antibiotic you're taking won't work against your infection. For example,  an infection caused by a virus can't be treated with antibiotics. Staying on an antibiotic when you don't need it is more likely to be harmful than helpful.      You may experience side effects from your antibiotic.    Like all medications, antibiotics have side effects. Some of these can be serious.    Let you healthcare team know if you have any known allergies when you are admitted to the hospital.    One significant side effect of nearly all antibiotics is the risk of severe and sometimes deadly diarrhea caused by Clostridium difficile (C. Difficile). This occurs when a person takes antibiotics because some good germs are destroyed. Antibiotic use allows C. diificile to take over, putting patients at high risk for this serious infection.    As a patient or caregiver, it is important to understand your or your loved one's antibiotic treatment. It is especially important for caregivers to speak up when patients can't speak for themselves. Here are some important questions to ask your healthcare team.    What infection is this antibiotic treating and how do you know I have that infection?    What side effects might occur from this antibiotic?    How long will I need to take this antibiotic?    Is it safe to take this antibiotic with other medications or supplements (e.g., vitamins) that I am taking?     Are there any special directions I need to know about taking this antibiotic? For example, should I take it with food?    How will I be monitored to know whether my infection is responding to the antibiotic?    What tests may help to make sure the right antibiotic is prescribed for me?      Information provided by:  www.cdc.gov/getsmart  U.S. Department of Health and Human Services  Centers for disease Control and Prevention  National Center for Emerging and Zoonotic Infectious Diseases  Division of Healthcare Quality Promotion             Medication List: This is a list of all your medications and when to take  them. Check marks below indicate your daily home schedule. Keep this list as a reference.      Medications           Morning Afternoon Evening Bedtime As Needed    albuterol 108 (90 Base) MCG/ACT Inhaler   Commonly known as:  PROAIR HFA/PROVENTIL HFA/VENTOLIN HFA   Inhale 2 puffs into the lungs every 4 hours as needed for shortness of breath / dyspnea or wheezing                                amoxicillin-clavulanate 875-125 MG per tablet   Commonly known as:  AUGMENTIN   Take 1 tablet by mouth every 12 hours for 8 days   Last time this was given:  1 tablet on 5/14/2018  8:07 AM                                aspirin 81 MG tablet   Take 81 mg by mouth daily                                fluticasone 50 MCG/ACT spray   Commonly known as:  FLONASE   Spray 2 sprays into both nostrils daily                                fluticasone-salmeterol 250-50 MCG/DOSE diskus inhaler   Commonly known as:  ADVAIR   Inhale 1 puff into the lungs 2 times daily                                ipratropium - albuterol 0.5 mg/2.5 mg/3 mL 0.5-2.5 (3) MG/3ML neb solution   Commonly known as:  DUONEB   Take 1 vial (3 mLs) by nebulization 4 times daily   Last time this was given:  3 mLs on 5/14/2018 12:56 PM                                loratadine 10 MG tablet   Commonly known as:  CLARITIN   Take 1 tablet (10 mg) by mouth daily                                thiamine 100 MG tablet   Take 1 tablet (100 mg) by mouth daily

## 2018-05-09 NOTE — PROGRESS NOTES
Patient Name: Naseem Ferrer  Medical Record Number: [id number]  Today's Date: 5/9/2018    Procedure: US Guided Biopsy  Proceduralist: Dr. Ponce    Sedation start time: 1240  Sedation end time: 1300  Sedation medications administered: 1.5 mg Versed, 75 mcg Fentanyl   Total sedation time: 20 min      Procedure start time: 1240  Puncture time: 1245  Procedure end time: 1300    Report given to: 2A RN      Other Notes: Pt arrived to CT 2 from . Pt denies any questions or concerns regarding procedure. Pt positioned supine and monitored per protocol. Pt tolerated procedure without any noted complications. Pt transferred back to .

## 2018-05-09 NOTE — IP AVS SNAPSHOT
[address] [address]    Phone:  [phone]                                       After Visit Summary   5/9/2018    Naseem Ferrer    MRN: [id number]           After Visit Summary Signature Page     I have received my discharge instructions, and my questions have been answered. I have discussed any challenges I see with this plan with the nurse or doctor.    ..........................................................................................................................................  Patient/Patient Representative Signature      ..........................................................................................................................................  Patient Representative Print Name and Relationship to Patient    ..................................................               ................................................  Date                                            Time    ..........................................................................................................................................  Reviewed by Signature/Title    ...................................................              ..............................................  Date                                                            Time

## 2018-05-09 NOTE — PROGRESS NOTES
Continues to have pain in abd, primarily right sided.  Continues to be diaphoretic, but lessened.   Able to doze for brief intervals after Dilaudid but pain remains 4-5/10 on 1-10 scale.    (Highest point post-procedure was 7-8.)

## 2018-05-09 NOTE — PROGRESS NOTES
Interventional Radiology Pre-Procedure Sedation Assessment   Time of Assessment: 11:01 AM    Expected Level: Moderate Sedation    Indication: Sedation is required for the following type of Procedure: Biopsy    Sedation and procedural consent: Risks, benefits and alternatives were discussed with Patient    PO Intake: Appropriately NPO for procedure    ASA Class: Class 2 - MILD SYSTEMIC DISEASE, NO ACUTE PROBLEMS, NO FUNCTIONAL LIMITATIONS.    Mallampati: Grade 2:  Soft palate, base of uvula, tonsillar pillars, and portion of posterior pharyngeal wall visible    Lungs: rhocnhi in the mid and lung bases. pt is on oxygen    Heart: Normal heart sounds and rate    History and physical reviewed and no updates needed. I have reviewed the lab findings, diagnostic data, medications, and the plan for sedation. I have determined this patient to be an appropriate candidate for the planned sedation and procedure and have reassessed the patient IMMEDIATELY PRIOR to sedation and procedure.    Ronn Gama MD

## 2018-05-09 NOTE — PROGRESS NOTES
Returned from IR post abd biopsy.  Having soreness at puncture site right upper abd.  Dressing C/D/I.  Family with him.  Repositioned.  Physician paged for post-procedure orders including pain meds.

## 2018-05-09 NOTE — PROGRESS NOTES
1530   Care assumed from Dacia Witt RN.  Pt continues to have abd pain which he states is starting to get worse.  Resp rate is in the 30s.  O2 is on at 6L/NC.  Dr. Ponce notified and and come over to see pt along with Dr. Haile.  1545  EKG and Hgb done now.  Pt continues to be extremely diaphoretic and continues to complain of ABD pain.    1555  Pt Sat is now at 88, heart rate is decreasing into the 50's.  BP is now 88/40's.  Dr. Ponce called again and Rapid  Response team called.  RT is here doing Duo Neb treatment.  Pt placed in [facility].  1605  Rapid Response team is here.  Starting 2nd IV now also.  1615  Connecting pt to cardiac monitor.  Shows sinus rhythm.  O2 now on at 6L/NC.  O2  Sat is improving. Now at 94%.  1635  To CT for scan accompanied by RN.  1650  Returned to 2A from CT.

## 2018-05-09 NOTE — PROVIDER NOTIFICATION
05/09/18 1600   Call Information   Date of Call 05/09/18   Time of Call 1611   Name of person requesting the team Chelsey   Title of person requesting team RN   RRT Arrival time 1613   Time RRT ended 1658   Reason for call   Type of RRT Adult   Primary reason for call Sudden or unanticipated change in patient condition   Was patient transferred from the ED, ICU, or PACU within last 24 hours prior to RRT call? Yes  (Interventional Radiology)   SBAR   Situation s/p omental biopsy, patient experiencing severe pain, resp. status deteriorating, diaphoretic,    Background Hypertension, liver mass   Notable History/Conditions COPD   Assessment patient alert, responding appropriately, still experiencing pain, O2 sats 98% on 6 Liters per Oxi plus mask,    Interventions O2 per N/C or mask;Other (describe)  (CT of abdomen with iv contrast)   Adjustments to Recommend CT showed abdominal bleed, Interventional Radiolgy ready to treat    Patient Outcome   Patient Outcome Transferred to  (Interventional Radiology)   RRT Team   Attending/Primary/Covering Physician Sebastian Ponce MD   Date Attending Physician notified 05/09/18   Time Attending Physician notified (Present at arrival of RRT)   Lead RN Kirk Atkinson, RN   WYATT Lux, RN   RT Murali Posey, RT

## 2018-05-09 NOTE — PROGRESS NOTES
Patient Name: Naseem Ferrer  Medical Record Number: [id number]  Today's Date: 5/9/2018    Procedure: Inferior epigastric artery angiogram and embolization  Proceduralist: Dr. Ponce.Dr. Schreiber.    Sedation start time: 1727  Sedation end time: 1900  Sedation medications administered: Fentanyl: 25 mcg,  Versed: 0.5mg  Total sedation time: 90 minutes    Procedure start time: 1727  Puncture time: 1730  Procedure end time: 1810    Report given to: Yolette SCHNEIDER      Other Notes: Pt arrived to IR room 1 from 2A. Pt denies any questions or concerns regarding procedure. Pt positioned supine and monitored per protocol. Pt tolerated procedure without any noted complications. Mynx closure device used. Ambulation time is 6 hours--- :0100am  Pt transferred to 6th floor.

## 2018-05-09 NOTE — PROGRESS NOTES
Patient prepped for liver biopsy.  Denies pain.  Mother-in-law waiting with him.  H & P needs update.  Labs pending.  Consent needed.

## 2018-05-10 NOTE — PROVIDER NOTIFICATION
[facility] notified of patient's RR in the 20's-30's. Co2 per capno machine is 15-20 with an IPI of 5. O2 is 92-94% on 6L NC. MD is ok with these numbers as long as patient has clear mentation.

## 2018-05-10 NOTE — PROGRESS NOTES
[facility], [place]    Internal Medicine Progress Note      Assessment & Plan   Naseem Ferrer is a 63 year old man with a history of COPD on 6L home O2, HTN, depression, HCV and prior heavy alcohol use w/o evidence of cirrhosis, and large hemorrhagic liver mass (concerning for malignancy) s/p urgent embolization 1/29/18 and not a candidate for surgical resection who presented on 5/9/18 for liver biopsy by IR. Post-op course c/b concern for bleeding prompting urgent embolization.     Liver Mass s/p biopsy w/ Intraperitoneal Hemorrhage s/p Embolization  Acute anemia  Per prior OP hepatology and surgical oncology notes, there is a high suspicion that his large hepatic mass is malignancy (possible HCC w/ underlying HCV) but was not a candidate for surgical resection as it would require a formal right hepatotectomy which would likely result in reactive right pleural effusion and >50% risk of post-op respiratory failure w/ severe underlying COPD. Previously underwent urgent IR embolization on 1/29/18 when there was lab and radiographic evidence of tumor rupture w/o intraperitoneal hemorrhage. Underwent a liver biopsy on 5/9/18 and developed post-op pain in the RUQ with some radiation to the left side. He was given 75 mcg IV Fentanyl (for procedure) and 0.5 mg IV Dilaudid without much relief. Post-procedure US showed small volume ascites. Became bradycardic (56) and hypotensive (88/40) which improved with 1L NS and [place] position however Hgb dropped from 15.9 to 13.9 prompting stat abd/pelvis CT which showed active hemorrhage from liver. Went for urgent embolization w/o source identified and had empiric embolization of the right inferior epigastric artery. Hgb has since downtrended 15.9->13.1->10.7->9.0, concerning for ongoing hemorrhage. Hemodynamically stable. Denies abdominal pain or lightheadedness/dizziness. IR following closely, appreciate assistance.   - Stat CT abd/pelvis with  angiography. Pending results, IR will consider another procedure  - FEN: NPO for now pending imaging results, NS 100cc/hr  - Pain: cont po dilaudid prn   - Monitor Hgb q6h overnight, transfuse for Hgb <7 or HD instability    Severe COPD. Seen by pulmonology 2/12/18. FEV1 0.5L. On 6L home O2 since January hospital d/c when he required intubation (was hypercarbic w/ a PCO2 up to 83 at that time). Did quit smoking since then. Spo2's in the mid-90's on 6 L's. Patient feels he is at his baseline with breathing.   - Continue home O2, aggressive pulmonary toilet.  - Continue home Advair and ABHISHEKD Davy    Consulting Services: Interventional Radiology    Diet: NPO  Fluids: NS @ 100 cc/hr  DVT Prophylaxis: Mechanical, pharm c/i in setting of acute bleed  Code Status: Full Code    Disposition Plan   Expected discharge: 2 - 3 days; recommended to prior living arrangement once medically stable.    The patient's care was discussed with the Attending Physician, Dr. Isaac, Bedside Nurse, Care Coordinator/, Patient, Patient's Family and IR Consultant.    Rhonda Moreno PA-C  [phone]    Interval History   Patient is feeling well. States that his breathing is at baseline - does not feel that he is having difficulty breathing. Denies chest pain. Denies abdominal pain. No nausea or vomiting. His main complaint is that he feels hungry. Hemodynamically stable. No lightheadedness or dizziness.     A 4 point ROS was performed (including CV, Resp, GI, ) and negative unless otherwise noted in HPI.     Physical Exam   /80 (BP Location: Left arm)  Pulse 94  Temp 98.1  F (36.7  C) (Oral)  Resp 20  Wt 64.2 kg (141 lb 8 oz)  SpO2 94%  BMI 24.29 kg/m2     General Appearance: Laying in bed on his left side, NAD.   Respiratory: Diffuse coarse crackles, effort normal on 6 L O2.   Cardiovascular: Regular rate and rhythm, no murmurs, rubs or gallops  GI: Soft, non-distended. No ttp.   Skin: No suspicious  lesions or rashes. Well-perfused.   Other: Dressing not examined by myself

## 2018-05-10 NOTE — PROGRESS NOTES
Interventional Radiology Progress Note     May 10, 2018    Subjective: Mr Ferrer states he feels well this morning. Endorses some right sided abdominal pain, otherwise without complaint. Denies chest pain, shortness of breath, nausea, or lower leg pain/weakness/tingling. States his pain has improved significantly overnight.    Objective: /86 (BP Location: Left arm)  Pulse 73  Temp 98.6  F (37  C) (Oral)  Resp 18  Wt 64.2 kg (141 lb 8 oz)  SpO2 93%  BMI 24.29 kg/m2    Results for orders placed or performed during the hospital encounter of 05/09/18 (from the past 24 hour(s))   Hemoglobin   Result Value Ref Range    Hemoglobin 13.1 (L) 13.3 - 17.7 g/dL   Comprehensive metabolic panel   Result Value Ref Range    Sodium 137 133 - 144 mmol/L    Potassium 4.3 3.4 - 5.3 mmol/L    Chloride 104 94 - 109 mmol/L    Carbon Dioxide 25 20 - 32 mmol/L    Anion Gap 8 3 - 14 mmol/L    Glucose 227 (H) 70 - 99 mg/dL    Urea Nitrogen 16 7 - 30 mg/dL    Creatinine 0.85 0.66 - 1.25 mg/dL    GFR Estimate >90 >60 mL/min/1.7m2    GFR Estimate If Black >90 >60 mL/min/1.7m2    Calcium 8.4 (L) 8.5 - 10.1 mg/dL    Bilirubin Total 0.6 0.2 - 1.3 mg/dL    Albumin 3.1 (L) 3.4 - 5.0 g/dL    Protein Total 7.7 6.8 - 8.8 g/dL    Alkaline Phosphatase 126 40 - 150 U/L    ALT 69 0 - 70 U/L     (H) 0 - 45 U/L   Magnesium   Result Value Ref Range    Magnesium 2.2 1.6 - 2.3 mg/dL   CT Abdomen Pelvis w Contrast   Result Value Ref Range    Radiologist flags Active extravasation (AA)     Narrative    EXAMINATION: CT ABDOMEN PELVIS W CONTRAST, 5/9/2018 5:03 PM    TECHNIQUE:  Helical CT images from the lung bases through the  symphysis pubis were obtained with IV contrast. Contrast dose: isovue  370    COMPARISON: CT calf 2/15/2018    HISTORY: evaluate for complication s/p biopsy-abd  pain;     FINDINGS:    Abdomen and pelvis: Redemonstration of inferior right hepatic lobe  heterogeneously enhancing mass which demonstrates arterial  enhancement  and progressive centripetal filling on subsequent venous and portal  venous phase images. Numerous tortuous arterial vessels course  throughout this lesion. Overall the lesion is not significantly  changed in size or appearance when compared to MRI dated 4/25/2018.    There are new changes of omental nodule biopsy in the right upper  quadrant anterior to the liver capsule and the liver mass with  multiple small foci of gas in the abdominal wall fat (series 16 image  178). On comparison examinations there was a small amount of simple  free fluid about the liver and upper abdomen. On the current exam,  there is a moderate amount of complex fluid with focally hyperdense  fluid about the omental nodule biopsy site extending into the right  paracolic gutter, compatible with hemorrhage. On arterial and early  venous phase images there is a suspicious vessel arising from the  anterior abdominal wall of the right upper quadrant (series 10 image  353). On delayed portal venous phase images there is active  extravasation into a portion of the perihepatic bleed with layering  contrast material (series 13 image 385).    No dilated small or large bowel. No gross free air, pneumatosis, or  portal venous gas. Sigmoid diverticulosis without diverticulitis.  Normal prostate gland and urinary bladder. No hydronephrosis.  Bilateral simple renal cysts. Otherwise normal kidneys. Normal spleen,  adrenals, pancreas, and gallbladder. Unchanged small sliding-type  hiatal hernia. Grossly unchanged appearance to multiple omental  nodules suspicious for metastatic disease.    Lung bases: Multiple new right lower lobe pulmonary nodules measuring  9 mm (series 13 image 38) in the medial right lower lobe, 8 mm (series  13 image 38) in the mid posterior right lower lobe, and 5 mm (series  13 image 36) immediately adjacent. Wall thickening and mucous plugging  in the bronchi of the right lower lobe suggestive these may be  infectious  or inflammatory in etiology. Other small nodules seen  previously in the left lower lobe have resolved.    Bones and soft tissues: No acute or suspicious osseous lesion. Soft  tissues of the abdominal walls are within normal limits.      Impression    IMPRESSION:   1. Postbiopsy changes for right upper quadrant anterior omental nodule  with evidence of active hemorrhage with increased volume of now  moderate complex free fluid about the liver and throughout the  abdomen.  2. Grossly unchanged appearance to large, heterogeneously enhancing  right hepatic lobe mass and omental nodularity suspicious for  malignancy with metastatic disease.  3. New right lower lobe nodules measuring up to 8 mm in diameter.  Associated bronchial wall thickening and mucous plugging in the right  lower lobe suggests that these may be infectious or inflammatory  etiology although they remain highly suspicious for metastatic disease  and short interval follow-up is recommended to evaluate for  stability/resolution.  4. Tree-in-bud opacities and nodules in the left lower lobe have  resolved, compatible with resolution of prior infectious or  inflammatory process.    [Critical Result: Active extravasation]    Finding was identified on 5/9/2018 5:05 PM.     Dr. Stearns was contacted by Dr. Johnson at 5/9/2018 5:09 PM and  verbalized understanding of the critical finding.     I have personally reviewed the examination and initial interpretation  and I agree with the findings.    NABILA ESCAMILLA MD   Hemoglobin   Result Value Ref Range    Hemoglobin 11.7 (L) 13.3 - 17.7 g/dL   CBC with platelets   Result Value Ref Range    WBC 20.9 (H) 4.0 - 11.0 10e9/L    RBC Count 3.55 (L) 4.4 - 5.9 10e12/L    Hemoglobin 10.7 (L) 13.3 - 17.7 g/dL    Hematocrit 33.5 (L) 40.0 - 53.0 %    MCV 94 78 - 100 fl    MCH 30.1 26.5 - 33.0 pg    MCHC 31.9 31.5 - 36.5 g/dL    RDW 15.9 (H) 10.0 - 15.0 %    Platelet Count 282 150 - 450 10e9/L   Comprehensive metabolic  panel   Result Value Ref Range    Sodium 138 133 - 144 mmol/L    Potassium 4.7 3.4 - 5.3 mmol/L    Chloride 108 94 - 109 mmol/L    Carbon Dioxide 20 20 - 32 mmol/L    Anion Gap 10 3 - 14 mmol/L    Glucose 115 (H) 70 - 99 mg/dL    Urea Nitrogen 23 7 - 30 mg/dL    Creatinine 0.77 0.66 - 1.25 mg/dL    GFR Estimate >90 >60 mL/min/1.7m2    GFR Estimate If Black >90 >60 mL/min/1.7m2    Calcium 8.2 (L) 8.5 - 10.1 mg/dL    Bilirubin Total 0.6 0.2 - 1.3 mg/dL    Albumin 2.8 (L) 3.4 - 5.0 g/dL    Protein Total 7.0 6.8 - 8.8 g/dL    Alkaline Phosphatase 100 40 - 150 U/L    ALT 72 (H) 0 - 70 U/L     (H) 0 - 45 U/L   Lactic acid level STAT for sepsis protocol   Result Value Ref Range    Lactate for Sepsis Protocol 1.1 0.4 - 1.9 mmol/L   Hemoglobin   Result Value Ref Range    Hemoglobin 9.9 (L) 13.3 - 17.7 g/dL     Hemoglobin   Date Value Ref Range Status   05/10/2018 9.9 (L) 13.3 - 17.7 g/dL Final   05/10/2018 10.7 (L) 13.3 - 17.7 g/dL Final   05/09/2018 11.7 (L) 13.3 - 17.7 g/dL Final   05/09/2018 13.1 (L) 13.3 - 17.7 g/dL Final   05/09/2018 13.9 13.3 - 17.7 g/dL Final       Intake/Output Summary (Last 24 hours) at 05/10/18 1026  Last data filed at 05/10/18 0756   Gross per 24 hour   Intake           788.33 ml   Output              575 ml   Net           213.33 ml     Imaging: No interval imaging    Physical Exam:  Gen: In bed, asleep but arousable, appears somewhat uncomfortable  Chest: RRR, wheezing bilaterally  Abdomen: Soft, nondistended. TTP along right abdomen at biopsy site. No periotoneal signs.  Extremities: No edema. Palpable pulses. Left groin puncture clean, dry, intact  Neuro: AAOx3    Assessment/Plan: 63 year old yo M POD#1 following right peritoneal biopsy with postprocedure bleeding with empiric embolization of the right inferior epigastric artery.   - Clinically doing well, pain improving overnight  - Hemoglobin continues to trend down. Unclear whether this is related to continued bleed or dilutional  anemia. Vitals have remained stable since embolization suggesting dilutional anemia. Will continue to follow serial hemoglobin and vitals. If there is concerned for continued bleeding, will obtain repeat CTA (renal function is normal).  - Call with questions/concerns. IR will continue to follow closely      Antoine Stearns  Fellow, Interventional Radiology  May 10, 2018    [phone] After Hours

## 2018-05-10 NOTE — PROGRESS NOTES
Transfer  Transferred from: IR  Via:bed  Reason for transfer:Pt appropriate for 6B- worsened patient condition. Needed ablation post biopsy of the liver for bleeding  Family: Aware of transfer  Belongings: Received with pt  Chart: Received with pt  Medications: Meds received from old unit with pt  2 RN Skin Assessment Completed By: Yolette RN and Dimas RN   Report received from: Elsa  Pt status: stable

## 2018-05-10 NOTE — PLAN OF CARE
Problem: Patient Care Overview  Goal: Plan of Care/Patient Progress Review  Outcome: No Change  Neuro: A&Ox4. Able to wake up easily, though very sleepy  Cardiac: SR. VSS. BP is soft but stable. Patient sleeping much of the time since being on the unit. No chest discomfort reported.   Respiratory: Sating 95 on 6L which is what the patient had been on at home.   GI/: Voided 175 into urinal. No BM this shift.   Diet/appetite: Tolerating clears diet. Wife reports patient has not eaten in 24 hours. Patient able to drink 100ml w/o nausea this shift.   Activity:  Bedrest until 0100.  Pain: At acceptable level on current regimen.   Skin: No new deficits noted. L groin site WNL with transparent dressing.   LDA's: L and R PIV, R side infusing at 100ml/hr saline    Plan: Continue with POC. Notify primary team with changes. Keep on capno monitoring.

## 2018-05-10 NOTE — BRIEF OP NOTE
Interventional Radiology Brief Post Procedure Note    Procedure: Visceral angiogram and embolization    Proceduralist: See Ponce MD    Assistant: Antoine Stearns MD    Time Out: Prior to the start of the procedure and with procedural staff participation, I verbally confirmed the patient s identity using two indicators, relevant allergies, that the procedure was appropriate and matched the consent or emergent situation, and that the correct equipment/implants were available. Immediately prior to starting the procedure I conducted the Time Out with the procedural staff and re-confirmed the patient s name, procedure, and site/side. (The Joint Commission universal protocol was followed.)  Yes        Sedation: IR Nurse Monitored Care   Post Procedure Summary:  Prior to the start of the procedure and with procedural staff participation, I verbally confirmed the patient s identity using two indicators, relevant allergies, that the procedure was appropriate and matched the consent or emergent situation, and that the correct equipment/implants were available. Immediately prior to starting the procedure I conducted the Time Out with the procedural staff and re-confirmed the patient s name, procedure, and site/side. (The Joint Commission universal protocol was followed.)  Yes       Sedatives: Fentanyl and Midazolam (Versed)    Vital signs, airway and pulse oximetry were monitored and remained stable throughout the procedure and sedation was maintained until the procedure was complete.  The patient was monitored by staff until sedation discharge criteria were met.    Patient tolerance: Patient tolerated the procedure well with no immediate complications.    Time of sedation in minutes: 90 Minutes minutes from beginning to end of physician one to one monitoring.          Findings: No clear bleed identified. Empiric embolization of right inferior epigastric artery.    Estimated Blood Loss: Minimal    Fluoroscopy Time:   minute(s)    SPECIMENS: None    Complications: 1. None     Condition: Stable    Plan: Patient to be admitted for close hemodynamic monitoring/pain control. Bedrest with left leg straight for 6 hours. IR will see him in the morning. Call pager for any issues [phone].    Comments: See dictated procedure note for full details.    Antoine Stearns MD

## 2018-05-10 NOTE — PROGRESS NOTES
Care Coordinator Progress Note    Admission Date/Time:  5/9/2018  Attending MD:  Robert Snyder MD    Data  Chart reviewed, discussed with interdisciplinary team.   Patient was admitted for: Data Unavailable.    Concerns with insurance coverage for discharge needs: None.  Current Living Situation: Patient lives with spouse.  Support System: Supportive and Involved  Services Involved: DME  Transportation at Discharge: Family or friend will provide  Transportation to Medical Appointments:   - self  Barriers to Discharge: chronically ill and pending medical clearance    Coordination of Care and Referrals: Provided patient/family with options for DME.        Assessment  64 yo with severe COPD, large liver mass, who presents for admission following liver biopsy of mass. Developed pain after procedure. Hgb dropped 2 grams. CT showed bleed so went back for embolization.  Spoke with patient at bedside.  Introduced RNCC role and discharge planning.  Patient lives with his wife and has been independent with activity.  States his wife could provide assistance at home if needed after discharge.  Patient and his wife do in home , so patient states he is able to step away for a break anytime.  Patient currently uses 6 liters of oxygen at baseline supplied through [employer].  He states his wife will provide transportation upon discharge and can bring his portable O2 at that time.  Will follow for discharge needs.    ___________________________  [facility]  Phone  [phone]  Fax  [phone]  ___________________________     Plan  Anticipated Discharge Date:  1-2 days  Anticipated Discharge Plan:  home    Brenna Gann RN, BSN  Care Coordinator [facility] [address]  Pager: [phone]  Phone: [phone]

## 2018-05-10 NOTE — DISCHARGE INSTRUCTIONS
___________________________  [facility]  Phone  [phone]  Fax  [phone]  ___________________________

## 2018-05-10 NOTE — PLAN OF CARE
"Problem: Patient Care Overview  Goal: Plan of Care/Patient Progress Review  Outcome: No Change  /86 (BP Location: Left arm)  Pulse 73  Temp 98.6  F (37  C) (Oral)  Resp 18  Wt 64.2 kg (141 lb 8 oz)  SpO2 93%  BMI 24.29 kg/m2  Neuro: A&Ox4.   Cardiac: SR. VSS.   Respiratory:  Pt initially on home dose of 6LNC, transferred to Opti Plus for sats <90%. Now on Opti Plus at 3L with sats in low 90's. Pt appears to be \"mouth breathing\" while asleep.   Lung sounds coarse;  Dyspnea with exertion only.   GI/: No BM. Standing at bedside to use urinal.  Diet/appetite: Sips of clear liquids during shift.   Activity:  SBA x1 when OOB.  Pain:  Persistent pain from right abd Bx performed 5/9, responsive to IV dilaudid as ordered.   Skin: No new deficits noted.  LDA's: PIV x2    Plan: Continue to monitor Hgb post liver Bx as appropriate; Provider notified of AM value.  Monitor and treat for pain. Wean back to home 02 (6LNC) as tolerated today.      Problem: Pain, Acute (Adult)  Goal: Identify Related Risk Factors and Signs and Symptoms  Related risk factors and signs and symptoms are identified upon initiation of Human Response Clinical Practice Guideline (CPG).   Outcome: No Change  Monitor and treat for pain. Pt tolerating  0.5mg  dose dilaudid with no neuro deficits, or RR depression.       "

## 2018-05-11 NOTE — PROGRESS NOTES
MD notified via web-based paging regarding 0400 hemoglobin at 8.3. MD states stable and no intervention await 0800 recheck and IR to eval. Will continue to monitor.

## 2018-05-11 NOTE — PROGRESS NOTES
[facility], [place]    Internal Medicine Progress Note - Gold Service      Assessment & Plan   Naseem Ferrer is a 63 year old man with a history of COPD on 6L home O2, HTN, depression, HCV and prior heavy alcohol use w/o evidence of cirrhosis, and large hemorrhagic liver mass (concerning for malignancy) s/p urgent embolization 1/29/18 and not a candidate for surgical resection who presented on 5/9/18 for liver biopsy by IR. Post-op course c/b concern for bleeding prompting urgent embolization.      Liver Mass s/p biopsy w/ Intraperitoneal Hemorrhage s/p Embolization  Acute anemia  Prior to biopsy, there was suspicion his large hepatic mass is malignancy. Previously underwent urgent IR embolization on 1/29/18 when there was lab and radiographic evidence of tumor rupture w/o intraperitoneal hemorrhage. Underwent a liver biopsy on 5/9/18, c/b immediate post op hypotension, bradycardia, and abd pain. Stat CT A/P showed active hemorrhage from the liver. S/p emergent embolization without source identified, thus had empiric embolization of the R inferior epigastric artery. Hgb continued to trend down from 15.9 to 9.0 on 5/10, prompting CT A/P angio which showed possible small pseudoaneurysm adjacent to the biopsied nodule. IR opted to transfuse 1U PRBC 5/10 and monitor overnight. Hgb was 8.7 this morning, now down to 8.0. Pt is hemodynamically stable. Reports some abd discomfort, not acute pain. Patient has been wanting to discharge daily, is frustrated with having to stay, but ultimately agreeable to one more procedure today. IR following closely, appreciate assistance.   - IR to perform angiogram  - FEN: NPO for now   - Pain: cont po dilaudid prn - decrease to q4h prn   - Monitor Hgb q4h, transfuse for Hgb <7  - Abdominal binder for compression     Metastatic hepatocellular carcinoma. New diagnosis. Pathology results from IR biopsy on 5/9 resulted today. Relayed pathology results to  "hepatology and Dr Juan kindly discussed results with patient. He is understanding that he has cancer that has spread. I personally contacted his wife to discuss results, she has several questions regarding prognosis, treatment, etc. Oncology consulted to assist with these questions. Pending on treatment, prognosis, etc, will address goals of cares with patient as he has been wanting to discharge daily.     Severe COPD. Seen by pulmonology 2/12/18. FEV1 0.5L. On 6L home O2 since January hospital d/c when he required intubation (was hypercarbic w/ a PCO2 up to 83 at that time). Did quit smoking since then. Spo2's in the mid-90's on 6 L's. Patient feels he is at his baseline with breathing.   - Continue home O2, aggressive pulmonary toilet.  - Continue home Advair and QID DuoNebs    Pain Assessment.  Current Pain Score 5/11/2018   Patient currently in pain? yes   Pain score (0-10) -   Pain location Abdomen   Pain descriptors Discomfort   CPOT pain score -   Naseem s pain level was assessed, regimen as outlined above.      Diet: NPO  Fluids: none  DVT Prophylaxis: mechanical, pharm c/i in setting of acute bleed  Code Status: Full Code    Disposition Plan   Expected discharge: 2 - 3 days, recommended to prior living arrangement once medically stable.     Entered: Rhonda Moreno 05/11/2018, 1:33 PM   Information in the above section will display in the discharge planner report.      The patient's care was discussed with the Attending Physician, Dr. Isaac, Bedside Nurse, Care Coordinator/, Patient, Patient's Family and IR and Hepatology Consultant.    Rhonda Moreno  Internal Medicine Staff Hospitalist Service  [facility]  Pager: [phone]  Please see sticky note for cross cover information    Interval History   Saqib is doing well today. Reports he feels \"great\" and wants to go home. He is frustrated with the complication of the procedure and is not happy about having to " have another procedure. He denies any chest pain, sob, or dyspnea. No fevers or chills. No other acute concerns.     Physical Exam   Vital Signs: Temp: 98.8  F (37.1  C) Temp src: Axillary BP: 110/63 Pulse: 94 Heart Rate: 87 Resp: 18 SpO2: 99 % O2 Device: Nasal cannula Oxygen Delivery: 6 LPM  Weight: 145 lbs 4.8 oz  General Appearance: resting in bed, NAD.  Respiratory: diffuse coarse crackles, effort normal on 6L per NC  Cardiovascular: RRR  GI: abd soft, nt/nd, no ttp  Skin: no suspicious lesions or rashes on exposed areas of skin

## 2018-05-11 NOTE — PROGRESS NOTES
Medicine Cross Cover Note    Following serial hgb w/ last check 9.3-->8.1 after having been up from 9 on check prior. No blood transfusion today. No large volume IV fluids (running 100 cc/hr maintenance). Discussed w/ RN. No change in pain or hemodynamics. Paged IR as they are following him closely. They agree w/ checking hgb again in 4 hours, sooner if any change in clinical status. Very low threshold to contact them again. They would like transfusion for hgb < 8. Signed out to night medicine attending and will give 1 unit PRBCs now. Patient's last PO intake was between 3235-0566 this evening, will make NPO again for now.     Brenda Stallings PA-C  Hospitalist Service

## 2018-05-11 NOTE — PROGRESS NOTES
Interventional Radiology Pre-Procedure Sedation Assessment   Time of Assessment: 3:57 PM    Expected Level: Moderate Sedation    Indication: Sedation is required for the following type of Procedure: Arterial    Sedation and procedural consent: Risks, benefits and alternatives were discussed with Patient    PO Intake: Appropriately NPO for procedure    ASA Class: Class 3 - SEVERE SYSTEMIC DISEASE, DEFINITE FUNCTIONAL LIMITATIONS.    Mallampati: Grade 2:  Soft palate, base of uvula, tonsillar pillars, and portion of posterior pharyngeal wall visible    Lungs: Lungs Clear with good breath sounds bilaterally    Heart: Normal heart sounds and rate    History and physical reviewed and no updates needed. I have reviewed the lab findings, diagnostic data, medications, and the plan for sedation. I have determined this patient to be an appropriate candidate for the planned sedation and procedure and have reassessed the patient IMMEDIATELY PRIOR to sedation and procedure.    Antoine Stearns MD

## 2018-05-11 NOTE — PROGRESS NOTES
DAILY ROUNDS CHECKLIST:     RN and Provider saw patient together: YES  Checklist was reviewed with Rhonda PINON      Status/Level of care:    - IMC:  continue with     Tethers:   - Telemetry: continue   - Urinary Catheter:  or Not present   - No line:      Anticoagulation for VTE prophylaxis:    - Reviewed with provider: YES    Rehab:   - PT/OT not indicated       Time until discharge:    - 2 - 3 days once source of bleeding is identified, hemoglobin is stable

## 2018-05-11 NOTE — PLAN OF CARE
Problem: Patient Care Overview  Goal: Plan of Care/Patient Progress Review  Outcome: No Change  Neuro: A&Ox4. Was slightly agitated earlier in shift because he wanted to be discharged yesterday.   Cardiac: HR 's. NSR/ST. Denies chest pain. Received 1 unit PRBCs for Hgb 8.1, down from 9.3. Last Hgb after blood products 8.3.   Resp: 6L O2 via oxymask (baseline home O2 needs).  GI/: Hypoactive BS. Distended abdomen unchanged. Voiding small, frequent amounts via bedside urinal.   Diet/Appetite: Tolerating regular diet but was placed NPO after Hgb decline.  Skin: Small primapore R) abdomen, no dressing. Scattered bruising. No other deficits.  Access: R) PIV L) PIV infusing 100cc NS/hr  Drains: n/a  Activity: Up SBA  Pain: PO Dilaudid 2mg q 3 hours.    Will continue with plan of care and notify MD of any changes.

## 2018-05-11 NOTE — PROGRESS NOTES
Patient Name: Naseem Ferrer  Medical Record Number: [id number]  Today's Date: 5/11/2018    Procedure: Visceral Angiogram, right groin interventional access.  Proceduralist: Dr. Schreiber, Dr. Li,    Sedation start time: 1617  Sedation end time: 1757  Sedation medications administered: 5 mg Versed, 250 mcg Fentanyl.   Total sedation time: 1 hour, 40 minutes    Patient in room: 1551  Procedure start time: 1617  Puncture time: 1621  Procedure end time: 1756  Patient out of room: 1826    Report given to: 6B RN    Other Notes: Pt arrived to IR room from .  Pt denies any questions or concerns regarding procedure. Pt positioned supine and monitored per protocol.  Pt was very figidity and became disoriented throughout procedure requiring very frequent reorientation to place and time and extra personnel to provide for patient safety.  Discussed patients status throughout procedure with providers with sedation, we discussed the potential for ETOH withdrawal with patients symptoms (diaphoretic, disoriented, occasional garbled speech in procedure).  Straight cath at end of procedure per providers.    Starclose closure device used in patients right groin, distal pulses intact following procedure, no hematoma present.    Pt transferred back to .  Bedside handoff done with RN, discussed patients disorientation in procedure.  Plan to recheck HGB, provide a 1:1 bedside attendant for safety.   Report also given to bedside attendant.

## 2018-05-11 NOTE — PLAN OF CARE
Problem: Patient Care Overview  Goal: Plan of Care/Patient Progress Review  Outcome: Improving  Pt alert and oriented x4. NPO. HR 80s-100s. Respirations 18-20. Dyspnea with exertion. Keep abdominal binder on as tolerated. No open notes (per doctor order). Pain controlled with dilaudid PO.  Tentative plan for IR this afternoon. PA to update pts wife.

## 2018-05-11 NOTE — PLAN OF CARE
AVSS HR 90s. Dilaudid 2mg PO x2 for pain. Pt has been frustrated today, wishing to go home. However, hemoglobins have been trending down. CT angiogram abdomen performed at 1530 to reassess. IR MDs reviewed. Plan is to monitor hgbs q4 hrs, allow patient to eat tonight and then NPO after midnight.  Hgb at 0935 = 9.9, at 1315 = 9.0, at 1630 = 9.3. Next hgb at 20:00.  Urine output adequate. No BM today. Patient was not interested in eating hospital food, but did end up ordering and enjoying chicken stir berkowitz.

## 2018-05-11 NOTE — PROGRESS NOTES
"Conveyed the findings of the biopsy (metastatic HCC) to him today, around 2 pm. He was alone. Explained he will talk with oncology regarding his options.    He repeated the finding \"so I have cancer and it has spread\".   Most of our conversation was surrounding his irritation at being in the hospital.  He said he just wants to go home and sit on his porch.    We will not follow.    Discussed with Gold Team, Rhonda PINON.  "

## 2018-05-11 NOTE — BRIEF OP NOTE
Interventional Radiology Brief Post Procedure Note    Procedure: Visceral angiogram    Proceduralist: Zenon Li MD    Assistant: Antoine Stearns MD    Time Out: Prior to the start of the procedure and with procedural staff participation, I verbally confirmed the patient s identity using two indicators, relevant allergies, that the procedure was appropriate and matched the consent or emergent situation, and that the correct equipment/implants were available. Immediately prior to starting the procedure I conducted the Time Out with the procedural staff and re-confirmed the patient s name, procedure, and site/side. (The Joint Commission universal protocol was followed.)  Yes        Sedation: IR Nurse Monitored Care   Post Procedure Summary:  Prior to the start of the procedure and with procedural staff participation, I verbally confirmed the patient s identity using two indicators, relevant allergies, that the procedure was appropriate and matched the consent or emergent situation, and that the correct equipment/implants were available. Immediately prior to starting the procedure I conducted the Time Out with the procedural staff and re-confirmed the patient s name, procedure, and site/side. (The Joint Commission universal protocol was followed.)  Yes       Sedatives: Fentanyl and Midazolam (Versed)    Vital signs, airway and pulse oximetry were monitored and remained stable throughout the procedure and sedation was maintained until the procedure was complete.  The patient was monitored by staff until sedation discharge criteria were met.    Patient tolerance: Patient did not tolerate moderate sedation and became combative requiring the procedure to be aborted.    Time of sedation in minutes: 90 Minutes minutes from beginning to end of physician one to one monitoring.          Findings: Limited visceral angiogram demonstrates no clear active bleed, however procedure was aborted prior to completion due to  patient intolerance of sedation.    Estimated Blood Loss: Minimal    Fluoroscopy Time:  minute(s)    SPECIMENS: None    Complications: 1. None     Condition: Stable    Plan: Patient to floor for continued cares. Bedrest for 3 hours with right leg straight. Transfuse as necessary to maintain hemoglobin and closely monitor vitals. If patient becomes unstable, emergent angiography can be performed, though will require general anesthesia.     Comments: See dictated procedure note for full details.    Antoine Stearns MD

## 2018-05-12 NOTE — PROGRESS NOTES
[facility], [place]    Internal Medicine Progress Note - Gold Service      Assessment & Plan   Naseem Ferrer is a 63 year old man with a history of COPD on 6L home O2, HTN, depression, HCV and prior heavy alcohol use w/o evidence of cirrhosis, and large hemorrhagic liver mass (concerning for malignancy) s/p urgent embolization 1/29/18 and not a candidate for surgical resection who presented on 5/9/18 for liver biopsy by IR. Post-op course c/b concern for bleeding prompting urgent embolization.       #Liver Mass s/p biopsy w/ Intraperitoneal Hemorrhage s/p Embolization  #Acute blood loss anemia  Prior to biopsy, there was suspicion his large hepatic mass is malignancy. Previously underwent urgent IR embolization on 1/29/18 when there was lab and radiographic evidence of tumor rupture w/o intraperitoneal hemorrhage. Underwent a liver biopsy on 5/9/18, c/b immediate post op hypotension, bradycardia, and abd pain. Stat CT A/P showed active hemorrhage from the liver. S/p emergent embolization without source identified, thus had empiric embolization of the R inferior epigastric artery. Hgb continued to trend down from 15.9 to 9.0 on 5/10, prompting CT A/P angio which showed possible small pseudoaneurysm adjacent to the biopsied nodule. IR opted to transfuse 1U PRBC 5/10 and monitor overnight. Hgb was 8.7 05/11, IR re attempted angiogram 05/11, unable to complete due to inability for patient to tolerate sedation. Pt is hemodynamically stable. Reports some abd discomfort, not acute pain. VSS.  -IR following closely, appreciate assistance, pt aware of plan to monitor until tomorrow  -Contact IR if any HD instability to perform urgent angiogram, will need general anesthesia  -ADAT to CLD  -Pain: cont po dilaudid prn - continue at q4h prn   -Monitor Hgb q6h, transfuse for Hgb <7  -Abdominal binder for compression      #Metastatic hepatocellular carcinoma: Stage 4 per Onc. New diagnosis.  Pathology results from IR biopsy on 5/9 resulted today. AFP elevated. Hepatology and oncology discussed with patient.   -Oncology to place OP referral and assist with appointment on discharge  -Chest CT ordered      #Severe COPD: Evaluated by pulmonology 2/12/18. FEV1 0.5L. On 6L home O2 since January hospital d/c when he required intubation (was hypercarbic w/ a PCO2 up to 83 at that time). Tobacco cessation since admission in 01/2018. Spo2's in the mid-90's on 6 L's. Patient feels he is at his baseline with breathing.   -Continue home O2, aggressive pulmonary toilet  -Continue home Advair and QID DuoNebs, PRN albuterol nebs    #Transaminitis: AST of 526, ALT of 92, normal tbili and alk phos. Discussed w/ GI, intermittently elevated and c/w 01/2018 results, felt to be 2/2 malignancy.   -Trend in AM  -INR, platelets in AM    # Pain Assessment:  Current Pain Score 5/12/2018   Patient currently in pain? denies   Pain score (0-10) -   Pain location -   Pain descriptors -   CPOT pain score -   Naseem s pain level was assessed and he currently denies pain.      Diet: Advance Diet as Tolerated: Clear Liquid Diet  Fluids: PO  DVT Prophylaxis: Pneumatic Compression Devices and Ambulate every shift  Code Status: Full Code    Disposition Plan   Expected discharge: Tomorrow vs monday, recommended to prior living arrangement once hemoglobin stable and plan from IR, oncology plan. Patient adamant he will leave tomorrow. IR aware.      Entered: Nadine Knight 05/12/2018, 9:50 AM   Information in the above section will display in the discharge planner report.      The patient's care was discussed with the Attending Physician, Dr. Isaac, Bedside Nurse, Patient and IR Consultant.    KATHRIN Guerin  Internal Medicine Staff Hospitalist Service  [facility]  Pager: [phone]  Please see sticky note for cross cover information    Interval History   The patient notes he is feeling ok. Pleasant this AM.  Remembers the procedure yesterday, aware it couldn't be completed. Worried about cancer diagnosis, anxious to talk to oncology. Denies any worsening abdominal pain. Notes a cough, now a little productive but w/o fevers or chills.       Data reviewed today: I reviewed all medications, new labs and imaging results over the last 24 hours. I personally reviewed no images or EKG's today.    Physical Exam   Vital Signs: Temp: 97.9  F (36.6  C) Temp src: Oral BP: 137/76   Heart Rate: 90 Resp: 20 SpO2: 93 % O2 Device: Nasal cannula Oxygen Delivery: 6 LPM  Weight: 144 lbs 9.95 oz  GENERAL: Alert and oriented x 3. Lying comfortably in bed.   HEENT: Anicteric sclera. Mucous membranes moist and without lesions.   CV: RRR. S1, S2. No murmurs appreciated.   RESPIRATORY: Effort normal on 6 LPM via NC. Lung sounds diffusely diminished with wheezing and crackles in bilateral bases no rhonchi.   GI: Abdomen soft and distended, bowel sounds present. No tenderness, rebound, guarding.   MUSCULOSKELETAL: No joint swelling or tenderness. Moves all extremities.   NEUROLOGICAL: No focal deficits.   EXTREMITIES: No peripheral edema.  SKIN: No jaundice. No rashes.        Data   Reviewed CMP, glucose, CBC

## 2018-05-12 NOTE — PLAN OF CARE
Problem: Patient Care Overview  Goal: Plan of Care/Patient Progress Review  Outcome: No Change  Neuro: A&Ox4.   Cardiac: SR-ST. VSS.   Respiratory: Requiring baseline of 6 LPM   GI/: Adequate urine output. No BM.   Diet/appetite: NPO  Activity:  Flat bedrest completed  Pain: At acceptable level on current regimen.   Skin: Right groin site appropriately tender and without hematoma. CMS intact.     Plan: Continue with POC. Notify primary team with changes.

## 2018-05-12 NOTE — PLAN OF CARE
Problem: Patient Care Overview  Goal: Discharge Needs Assessment  Outcome: No Change  D AVSS with sat's 97% on oxygen set to 6L/min via oxiplus mask. Heart regular and lungs coarse throughout. Voiced c/o abdominal pain which has been controlled with PO dilaudid and has denied nausea. Voiding large amounts of enriqueta urine with IV fluids running per order and taking in some clear fluids. Patient frustrated about being here in the hospital and not knowing what is going on with his cares. Encouraged him to at least wait for the [facility] doctors to come in to give him their opinion on possible treatment options which he and his wife agreed he would do.  I Vital's, assessment and med's per order.   A Resting in bed with call light in reach.   P Continue to monitor and update MD with changes.

## 2018-05-12 NOTE — CONSULTS
Oncology Consult    May 12, 2018    Naseem Ferrer is a 63 year old man with a history of severe COPD on 6L home O2, HTN, depression, HCV and prior heavy alcohol use w/o evidence of cirrhosis, and large hemorrhagic liver mass (concerning for malignancy) s/p urgent embolization 1/29/18 and not a candidate for surgical resection who presented 5/9/18 for omental biopsy by IR. Post op course c/b concern for bleeding prompting urgent embolization. Now [place] [facility] [place] monitoring of hemodynamics and respiratory status.     Omental bx has returned back has HCC.  Unfortunately since his HCC is outside of his liver it is stage IV, which we have told the patient.  I explained that unfortunately  When people have Stage IV HCC it means that it is incurable.  And our treatments for him would be to maintain/improve his quality life and hope to stop the cancer from progressing for as long as we can.      Saqib was with his wife Myia, and both understand what this means.  Saqib is very angry with the news and not sure if he would like to pursue treatment.  He willing to have an appointment with our GI oncology team to further discuss treatment options.    Plan  -- Chest CT and AFP   -- I will coordinate follow up with our cancer center to establish care.    Sasha Duarte MD  [id number]  Hematology Oncology Fellow  Pager: [phone]

## 2018-05-12 NOTE — PROGRESS NOTES
Interventional Radiology Progress Note     May 12, 2018    Subjective: Mr Ferrer feels ok this morning, continues to have some right sided abdominal pain. Also endorses a cough, though states this happens from time to time. Would like to eat. Has minimal recollection of angiogram previous evening.  Briefly discussed his newly biopsy proven metastatic hepatocellular carcinoma and that we know it has spread into his abdomen and there may be some tumor in his lungs, though a full workup has not been done. Discussed that oncology would likely meet with him to discuss further workup and treatment options.    Objective: /67  Pulse 94  Temp 98.4  F (36.9  C) (Oral)  Resp 20  Wt 65.6 kg (144 lb 10 oz)  SpO2 100%  BMI 24.82 kg/m2    Results for orders placed or performed during the hospital encounter of 05/09/18 (from the past 24 hour(s))   Hemoglobin   Result Value Ref Range    Hemoglobin 8.7 (L) 13.3 - 17.7 g/dL   Hemoglobin   Result Value Ref Range    Hemoglobin 8.0 (L) 13.3 - 17.7 g/dL   ISTAT gases elec ica gluc art POCT   Result Value Ref Range    pH Arterial 7.26 (L) 7.35 - 7.45 pH    pCO2 Arterial 57 (H) 35 - 45 mm Hg    pO2 Arterial 83 80 - 105 mm Hg    Bicarbonate Arterial 26 21 - 28 mmol/L    O2 Sat Arterial 94 92 - 100 %    Sodium 140 133 - 144 mmol/L    Potassium 4.3 3.4 - 5.3 mmol/L    Glucose 87 70 - 99 mg/dL    Calcium Ionized 4.6 4.4 - 5.2 mg/dL    Hemoglobin 14.3 13.3 - 17.7 g/dL    Hematocrit - POCT 42 40.0 - 53.0 %PCV   Hemoglobin   Result Value Ref Range    Hemoglobin 8.3 (L) 13.3 - 17.7 g/dL   Lactic acid level STAT for sepsis protocol   Result Value Ref Range    Lactate for Sepsis Protocol 0.4 0.4 - 1.9 mmol/L   Hemoglobin   Result Value Ref Range    Hemoglobin 8.2 (L) 13.3 - 17.7 g/dL   CBC with platelets   Result Value Ref Range    WBC 12.9 (H) 4.0 - 11.0 10e9/L    RBC Count 2.87 (L) 4.4 - 5.9 10e12/L    Hemoglobin 8.5 (L) 13.3 - 17.7 g/dL    Hematocrit 27.9 (L) 40.0 - 53.0 %    MCV  97 78 - 100 fl    MCH 29.6 26.5 - 33.0 pg    MCHC 30.5 (L) 31.5 - 36.5 g/dL    RDW 17.0 (H) 10.0 - 15.0 %    Platelet Count 199 150 - 450 10e9/L   Comprehensive metabolic panel   Result Value Ref Range    Sodium 138 133 - 144 mmol/L    Potassium 4.1 3.4 - 5.3 mmol/L    Chloride 103 94 - 109 mmol/L    Carbon Dioxide 29 20 - 32 mmol/L    Anion Gap 6 3 - 14 mmol/L    Glucose 87 70 - 99 mg/dL    Urea Nitrogen 10 7 - 30 mg/dL    Creatinine 0.53 (L) 0.66 - 1.25 mg/dL    GFR Estimate >90 >60 mL/min/1.7m2    GFR Estimate If Black >90 >60 mL/min/1.7m2    Calcium 8.1 (L) 8.5 - 10.1 mg/dL    Bilirubin Total 1.2 0.2 - 1.3 mg/dL    Albumin 2.7 (L) 3.4 - 5.0 g/dL    Protein Total 7.2 6.8 - 8.8 g/dL    Alkaline Phosphatase 115 40 - 150 U/L    ALT 92 (H) 0 - 70 U/L     (HH) 0 - 45 U/L     Hemoglobin   Date Value Ref Range Status   05/12/2018 8.4 (L) 13.3 - 17.7 g/dL Final   05/12/2018 8.5 (L) 13.3 - 17.7 g/dL Final   05/12/2018 8.2 (L) 13.3 - 17.7 g/dL Final   05/11/2018 8.3 (L) 13.3 - 17.7 g/dL Final   05/11/2018 14.3 13.3 - 17.7 g/dL Final     WBC   Date Value Ref Range Status   05/12/2018 12.9 (H) 4.0 - 11.0 10e9/L Final   05/10/2018 20.9 (H) 4.0 - 11.0 10e9/L Final   05/09/2018 9.8 4.0 - 11.0 10e9/L Final   03/19/2018 9.8 4.0 - 11.0 10e9/L Final   02/05/2018 13.5 (H) 4.0 - 11.0 10e9/L Final       Intake/Output Summary (Last 24 hours) at 05/12/18 0727  Last data filed at 05/12/18 0600   Gross per 24 hour   Intake              920 ml   Output             2665 ml   Net            -1745 ml       Imaging: Angiogram performed yesterday without intervention.    Physical Exam:  Gen: In bed, alert, wife at bedside  Chest: RRR, wheezing bilaterally, unchanged from previous. No rhochi or rales.  Abdomen: Soft, nondistended. TTP along right abdomen No periotoneal signs.  Extremities: No edema. Palpable pulses. Right groin puncture clean, dry, intact  Neuro: AAOx3    Assessment/Plan: 63 year old yo M POD#3 following right peritoneal  biopsy with postprocedure bleeding with empiric embolization of the right inferior epigastric artery. POD#1 following repeat angiogram which was not completed to due intolerance of moderate sedation.  - Clinically doing well this morning with stable physical exam. Hemoglobin remains stable.  - Unable to complete angiography previous evening due to intolerance of sedation (extreme agitation leading to dislodgement of groin sheath despite restraints on arms and legs)  - Recommend continued close monitoring of hemoglobin and vitals. Most recent CTA did not demonstrate active bleeding. Transfuse as necessary and ensure platelets/product are replenished as needed to maintain normal coagulation profile. If patient becomes hypotensive/tachycardic, consult IR for possible angiogram. This would require general anesthesia and would be performed only in an urgent/emergent setting.  - Downtrending leukocytosis likely related to prior embolization. Given cough and intermittent fevers CXR/CT could be considered to evaluate for pneumonia (CT will also be necessary for staging purposes).    Antoine Stearns  Fellow, Interventional Radiology  May 10, 2018    [phone] After Hours

## 2018-05-13 NOTE — PLAN OF CARE
Problem: Patient Care Overview  Goal: Plan of Care/Patient Progress Review  Outcome: Therapy, progress towards functional goals is fair  /84 (BP Location: Left arm)  Pulse 94  Temp 98  F (36.7  C) (Oral)  Resp 20  Wt 65.6 kg (144 lb 10 oz)  SpO2 93%  BMI 24.82 kg/m2  Neuro: A&Ox4. Gets agitated easily.  Cardiac: VSS. ST/SR  Respiratory: 2L of O2.  GI/: Voiding spontaneously. No BM this shift.   Diet/appetite: Tolerating diet. Denies nausea   Activity: Up SBA  Pain: . Agreeable to current regimen.  Skin: Intact, no new deficits noted.  Lines: PIV  Drains: None.    Hgb staying stable. Recent hgb 8.6. POC discussed with pt and spouse. Will continue to monitor and follow plan of care.

## 2018-05-13 NOTE — PLAN OF CARE
Problem: Patient Care Overview  Goal: Plan of Care/Patient Progress Review  Outcome: Improving  Neuro: A&Ox4. Anxious.   Cardiac: SR to S-tachycardia.   Vitals:  Temp: 98.5  F (36.9  C) Temp src: Oral BP: 151/72   Heart Rate: 90 Resp: 20  Respiratory: Sating adequately on 3-5 LPM NC.  Denies any change in respiratory status.   GI/: High urine output. No BM.   Diet/appetite: Tolerating regular diet. Eating well.  Activity:  Assist of 1.  Pain: At acceptable level on current regimen.   Skin: No new deficits noted.  LDA's:  Right groin site CDI, small dime sized hematoma, unchanged overnight.   Plan: Continue with POC. Notify primary team with changes.    Problem: Chronic Respiratory Difficulty Comorbidity  Goal: Chronic Respiratory Difficulty  Patient comorbidity will be monitored for signs and symptoms of Respiratory Difficulty (Chronic) condition.  Problems will be absent, minimized or managed by discharge/transition of care.  Outcome: No Change  O2 via nasal cannula, 3-5 LPM nasal cannula.

## 2018-05-13 NOTE — PROGRESS NOTES
Interventional Radiology Progress Note     May 13, 2018    Subjective: Mr Ferrer is without complaint this morning and is quite frustrated that he is still in the hospital. His wife is at the bedside and also expresses frustration and wishes they could go home, but understand the current situation as it relates to both recent bleeding and cancer diagnosis. Ms Ferrer is particularly frustrated that she will not be able to discuss treatment options until they are outpatient and wants to get him started on treatment as soon as possible.    Objective: /75 (BP Location: Right arm)  Pulse 94  Temp 98.1  F (36.7  C) (Oral)  Resp 20  Wt 65.6 kg (144 lb 10 oz)  SpO2 93%  BMI 24.82 kg/m2    Results for orders placed or performed during the hospital encounter of 05/09/18 (from the past 24 hour(s))   Hemoglobin   Result Value Ref Range    Hemoglobin 8.4 (L) 13.3 - 17.7 g/dL   AFP tumor marker   Result Value Ref Range    Alpha Fetoprotein 9.1 (H) 0 - 8 ug/L   CT Chest w/o Contrast    Narrative    EXAMINATION: Chest CT 5/12/2018 2:53 PM.    CLINICAL HISTORY: leukocytosis, new diagnosis HCC w/ mets- eval for  PNA, pulmonary mets;     COMPARISON: Outside chest CT 1/17/2018    TECHNIQUE: Acquisition of CT images through the chest without  contrast. Coronal and axial MIP reformatted images obtained.    FINDINGS:    The thyroid is unremarkable. No axillary lymphadenopathy. A few small  mediastinal nodes. The central tracheobronchial tree is patent. The  heart is not enlarged. No pericardial effusion. No pleural effusion.    Multiple pulmonary nodules (greater than 20), bilateral but  predominantly in the right lower lobe. The largest lesion measures 1.4  cm. Small area of groundglass opacity along the peripheral aspect of  the right middle lobe. There are tree in bud opacities in the right  upper and right lower lobes.     Limited evaluation of the upper abdomen. Large right hepatic lobe mass  and peritoneal  implants consistent with known HCC is better evaluated  on CT abdomen 5/9/2018.    No worrisome osseous abnormality.      Impression    IMPRESSION:   1. Innumerable pulmonary nodules measuring up to 1.4 cm, consistent  with metastatic disease.  2. Small area of groundglass opacity along the peripheral aspect of  the right middle lobe and tree in bud opacities; differential includes  metastatic disease versus infection.  3. Liver mass and peritoneal carcinomatosis    I have personally reviewed the examination and initial interpretation  and I agree with the findings.    MARK ANTHONY ROSS MD   Hemoglobin   Result Value Ref Range    Hemoglobin 8.6 (L) 13.3 - 17.7 g/dL   Hemoglobin   Result Value Ref Range    Hemoglobin 8.2 (L) 13.3 - 17.7 g/dL   Lactic acid level STAT for sepsis protocol   Result Value Ref Range    Lactate for Sepsis Protocol 0.7 0.4 - 1.9 mmol/L   Hemoglobin   Result Value Ref Range    Hemoglobin 7.5 (L) 13.3 - 17.7 g/dL   Comprehensive metabolic panel   Result Value Ref Range    Sodium 136 133 - 144 mmol/L    Potassium 3.6 3.4 - 5.3 mmol/L    Chloride 103 94 - 109 mmol/L    Carbon Dioxide 28 20 - 32 mmol/L    Anion Gap 6 3 - 14 mmol/L    Glucose 95 70 - 99 mg/dL    Urea Nitrogen 7 7 - 30 mg/dL    Creatinine 0.49 (L) 0.66 - 1.25 mg/dL    GFR Estimate >90 >60 mL/min/1.7m2    GFR Estimate If Black >90 >60 mL/min/1.7m2    Calcium 7.7 (L) 8.5 - 10.1 mg/dL    Bilirubin Total 1.4 (H) 0.2 - 1.3 mg/dL    Albumin 2.4 (L) 3.4 - 5.0 g/dL    Protein Total 6.5 (L) 6.8 - 8.8 g/dL    Alkaline Phosphatase 96 40 - 150 U/L    ALT 63 0 - 70 U/L     (H) 0 - 45 U/L   INR   Result Value Ref Range    INR 1.15 (H) 0.86 - 1.14   CRP inflammation   Result Value Ref Range    CRP Inflammation 65.0 (H) 0.0 - 8.0 mg/L   Nt probnp inpatient   Result Value Ref Range    N-Terminal Pro BNP Inpatient 1328 (H) 0 - 900 pg/mL   Hemoglobin   Result Value Ref Range    Hemoglobin 8.0 (L) 13.3 - 17.7 g/dL     Hemoglobin   Date  Value Ref Range Status   05/13/2018 8.0 (L) 13.3 - 17.7 g/dL Final   05/13/2018 7.5 (L) 13.3 - 17.7 g/dL Final   05/12/2018 8.2 (L) 13.3 - 17.7 g/dL Final   05/12/2018 8.6 (L) 13.3 - 17.7 g/dL Final   05/12/2018 8.4 (L) 13.3 - 17.7 g/dL Final     WBC   Date Value Ref Range Status   05/12/2018 12.9 (H) 4.0 - 11.0 10e9/L Final   05/10/2018 20.9 (H) 4.0 - 11.0 10e9/L Final   05/09/2018 9.8 4.0 - 11.0 10e9/L Final   03/19/2018 9.8 4.0 - 11.0 10e9/L Final   02/05/2018 13.5 (H) 4.0 - 11.0 10e9/L Final       Intake/Output Summary (Last 24 hours) at 05/13/18 1052  Last data filed at 05/13/18 0654   Gross per 24 hour   Intake          3208.33 ml   Output             2100 ml   Net          1108.33 ml       Imaging: Chest CT shows innumberable pulmonary metastases, possible pneumonia/aspiration    Physical Exam:  Gen: In bed, alert, wife at bedside  Chest: RRR, wheezing bilaterally, unchanged from previous. No rhochi or rales.  Abdomen: Soft, nondistended. TTP along right abdomen No periotoneal signs.  Extremities: No edema. Palpable pulses. Right groin puncture clean, dry, intact  Neuro: AAOx3    Assessment/Plan: 63 year old yo M POD#4 following right peritoneal biopsy with postprocedure bleeding with empiric embolization of the right inferior epigastric artery. POD#2 following repeat angiogram which was not completed to due intolerance of moderate sedation. Biopsy results and chest CT with newly diagnosed metastatic HCC.    - Clinically doing well this morning with stable physical exam. Hemoglobin slightly decreased. Fluids discontinued which may have contributed to dilutional anemia. Recheck shows return to baseline. Recommend continued serial hemoglobin checks and discontinuation of IV fluids.    - Transfuse as necessary and ensure platelets/product are replenished as needed to maintain normal coagulation profile. If patient becomes hypotensive/tachycardic, consult IR for possible angiogram. This would require general  anesthesia and would be performed only in an urgent/emergent setting.    - Will discuss with full IR team Monday morning (5/14) to discuss whether further invasive/noninvasive testing is warranted. This was discussed with both the patient and his wife who agreed to the plan.    - Briefly discussed treatment and prognosis of advanced HCC. Reinforced that goals would be comfort and best possible control of disease, and that this is not a curable condition at this stage. They still have many questions which they want to discuss with oncology.    - Patient's wife expressed desire that they speak with someone today for emotional support and requested a  (no preference for denomination).      Antoine Stearns  Fellow, Interventional Radiology      [phone]  [phone] After Hours

## 2018-05-13 NOTE — PROGRESS NOTES
[facility], [place]    Internal Medicine Progress Note - Gold Service      Assessment & Plan   Naseem Ferrer is a 63 year old man with a history of COPD on 6L home O2, HTN, depression, HCV and prior heavy alcohol use w/o evidence of cirrhosis, and large hemorrhagic liver mass (concerning for malignancy) s/p urgent embolization 1/29/18 and not a candidate for surgical resection who presented on 5/9/18 for liver biopsy by IR. Post-op course c/b concern for bleeding prompting urgent embolization.       Liver Mass s/p biopsy w/ Intraperitoneal Hemorrhage s/p Embolization  Acute blood loss anemia  Prior to biopsy, there was suspicion his large hepatic mass is malignancy. Previously underwent urgent IR embolization on 1/29/18 when there was lab and radiographic evidence of tumor rupture w/o intraperitoneal hemorrhage. Underwent a liver biopsy on 5/9/18, c/b immediate post op hypotension, bradycardia, and abd pain. Stat CT A/P showed active hemorrhage from the liver. S/p emergent embolization without source identified, thus had empiric embolization of the R inferior epigastric artery. Hgb continued to trend down from 15.9 to 9.0 on 5/10, prompting CT A/P angio which showed possible small pseudoaneurysm adjacent to the biopsied nodule. IR opted to transfuse 1U PRBC 5/10 and monitor overnight. Hgb was 8.7 05/11, IR re attempted angiogram 05/11, unable to complete due to inability for patient to tolerate sedation. Pt is hemodynamically stable. Reports some abd discomfort, not acute pain. VSS. Hgb stable at 8.0.   -IR following closely, appreciate assistance, pt aware of plan to monitor until tomorrow  -FEN: reg diet  -Pain: decrease dilaudid to 1mg q6h prn today, would stop altogether tomorrow as pt appears very comfortable  -Monitor Hgb q6h, transfuse for Hgb <7  -Abdominal binder for compression       Metastatic hepatocellular carcinoma. Stage 4 per Onc. New diagnosis. Hepatology and oncology  discussed with patient. Chest CT showed innumerable pulm nodules. Family is very distraught with diagnosis and curious about more resources for support.   - Palliative care consult placed  - Oncology to place outpatient referral and assist with appointment on discharge       Severe COPD  Community acquired pneumonia  Evaluated by pulmonology 2/12/18. FEV1 0.5L. On 6L home O2 since January hospital d/c when he required intubation (was hypercarbic w/ a PCO2 up to 83 at that time), but patient reports he sometimes doesn't use oxygen at all. Tobacco cessation since admission in 01/2018. Spo2's in the mid-90's on 6 L's. Patient feels he is at his baseline with breathing. CT Chest 5/12 with innumerable pulmonary nodules c/w metastatic disease and small area of GGO along R middle love and tree bud opacities concerning for metastatic disease and infection. Procalc 0.5 (difficult to interpret in setting of malignancy). WBC 10.3 (down from 20.9 on admission - which was likely stress response).   - Will start augmentin for possible CAP  - Continue home O2, aggressive pulmonary toilet  - Continue home Advair and QID DuoNebs, PRN albuterol nebs  - Trend procalc tomorrow     Transaminitis. AST of 261 (526), ALT of 63 (92), normal tbili and alk phos. INR 1.15. Plt 201. Discussed w/ GI, intermittently elevated and c/w 01/2018 results, felt to be 2/2 malignancy.     Pain Assessment.  Current Pain Score 5/13/2018   Patient currently in pain? sleeping: patient not able to self report   Pain score (0-10) -   Pain location -   Pain descriptors -   CPOT pain score -   - Naseem is not experiencing pain.     Diet: regular adult diet  Fluids: po  DVT Prophylaxis: mechanical, pharm c/i in setting GIB  Code Status: Full Code    Disposition Plan   Expected discharge: 2 - 3 days, recommended to prior living arrangement once hemoglobin stabilized, outpatient follow up established.     Entered: Rhonda Moreno 05/13/2018, 10:37 AM    Information in the above section will display in the discharge planner report.      The patient's care was discussed with the Attending Physician, Dr. Isaac, Bedside Nurse, Patient and Patient's Family.    Rhonda Beard [facility]  [place] Staff Hospitalist Service  [facility]  Pager: [phone]  Please see sticky note for cross cover information    Interval History   Saqib is tired today. He continues to say that he feels well and has no issues. He wants to go home. He is understanding of his diagnosis and is just sick of being here. No complaints of pain. No chest pain, sob, or dyspnea. No fevers or chills. No abdominal pain, nausea or emesis. No other acute concerns.     Physical Exam   Vital Signs: Temp: 98.1  F (36.7  C) Temp src: Oral BP: 136/75   Heart Rate: 94 Resp: 20 SpO2: 93 % O2 Device: Nasal cannula Oxygen Delivery: 3 LPM  Weight: 144 lbs 9.95 oz  General Appearance: Resting in bed, NAD, pleasant, wife at bedside  Respiratory: effort normal on 6L per NC, rhonchi appreciated on R, no crackles or wheezing  Cardiovascular: RRR  GI: abd soft, nt/nd  Skin: no jaundice or rashes on exposed areas of skin

## 2018-05-13 NOTE — PROGRESS NOTES
Shift: [id number]  VS: Temp: 98.3  F (36.8  C) Temp src: Oral BP: 135/82   Heart Rate: 86 Resp: 18 SpO2: 93 % O2 Device: None (Room air) Oxygen Delivery: 1 LPM  Neuro: AAOx4, impulsive.  Cardiac: NSR 90s  Respiratory: 1L NC, tried to wean today and unable, using IS.  GI/: BS active, voiding adequately  Diet/appetite: Regular, tolerating oral intake  Activity: SBA  Pain: R upper quadrant, dilaudid 1mg q6hr  Skin: r groin site  LDA's: PIVx1  Pertinent Labs/Lab Collection:      Plan: IR meeting in AM to discuss safe discharge to home plan. Outpatient medical oncology appt at later time to discuss options. Palliative consult placed on Sunday.

## 2018-05-13 NOTE — PROVIDER NOTIFICATION
Jeri Kearney MD notified of last Hgb check. No increase in abdominal pain.  Also discussed IV fluids and UOP.  DC'd IV fluids.

## 2018-05-13 NOTE — PROGRESS NOTES
"SPIRITUAL HEALTH SERVICES  SPIRITUAL ASSESSMENT Progress Note (Palliative Focus)  [facility] 6B  On-call Visit    I visited with pt's spouse, per her request (pt had declined  visit). Spouse shared in-depth regarding:     pt's medical condition, which she understands as being nearing end-of-life the traumatic death of their child years ago who was born early and did not survive (\"I know this might sound weird, but our baby  in early , and I feel that with that anniversary coming up soon , that my  will die around that time, so I feel like I have very little time left with him...\")     her own understanding of \"God's role in all this. I feel that God called our baby to be with him, and that everything happens for a reason... And now I feel that God is calling Saqib to be with him, and I know he will be better off in [facility], but still I'm so sad. And Saqib did so much for me.\"   Spouse expressed that she didn't think pt was ready for hospice care yet, \"because that would be the end of it for him\". Prayer was shared with spouse.    Pt now has a palliative consult - spouse would benefit from discussion and support regarding goals of care, and she will appreciate ongoing  support. Palliative  will informed of this visit.      José Verma) Carolyn Tapia M.Div., [facility]  Staff   Pager [phone]       "

## 2018-05-14 NOTE — PROGRESS NOTES
Pt discharged to home. Family bringing patient home. Meds sent to home pharmacy. Education completed and discharge instructions given with instructions for follow-up.

## 2018-05-14 NOTE — DISCHARGE SUMMARY
[facility], [place]    Internal Medicine Discharge Summary- Gold Service    Date of Admission:  5/9/2018  Date of Discharge:  5/14/2018  Discharging Provider: Sydney Joyce CNP/Devin Isaac MD   Discharge Team: Gold 2    Discharge Diagnoses      Naseem Ferrer is a 63 year old man with a history of COPD on 6L home O2, HTN, depression, HCV and prior heavy alcohol use w/o evidence of cirrhosis, and large hemorrhagic liver mass (concerning for malignancy) s/p urgent embolization 1/29/18 and not a candidate for surgical resection who presented on 5/9/18 for liver biopsy by IR. Post-op course c/b concern for bleeding prompting urgent embolization.     # S/p liver biopsy w/ intraperitoneal hemorrhage s/p embolization   # Acute blood loss anemia   # # Stage IV metastatic hepatocellular carcinoma   # Hx HCV  # Severe COPD  # CAP   # Abnormal LFTs   # HTN   # Depression     Follow-ups Needed After Discharge   Follow up with GI/Hepatology - scheduled for June 11  Follow up with Oncology -  to call and confirm date/time   Follow up with PCP in one week     Hospital Course   Naseem Ferrer was admitted on 5/9/2018 for intraperitoneal hemorrhage s/p liver biopsy requiring urgent embolization.  The following problems were addressed during his hospitalization:    # S/p liver biopsy w/ intraperitoneal hemorrhage s/p embolization   # Acute blood loss anemia   Hgb, VS stable at time of discharge.  S/p liver biopsy on 5/9/18, c/b immediate post op hypotension, bradycardia, and abdominal pain. STAT CT A/P showed active hemorrhage from the liver. S/p emergent embolization without source identified, thus had empiric embolization of the R inferior epigastric artery. Hgb continued to trend down from 15.9 to 9.0 on 5/10, prompting CT A/P angio which showed possible small pseudoaneurysm adjacent to the biopsied nodule. IR opted to transfuse 1U PRBC 5/10 and monitor overnight, with improvement  in Hgb to 8.7.  IR re-attempted angiogram on 5/11, unable to complete due to inability for patient to tolerate sedation.  Pt wife concerned about risks and signs/symptoms of acute bleed, provided education to her to take pt to ED if acute shortness of breath, chest pain, fatigue/weakness, abdominal pain, or overt signs of bloody stools, bloody emesis.  Would have pt recheck Hgb in 5-7 days with PCP.      # Stage IV metastatic hepatocellular carcinoma   # Hx HCV  New diagnosis this admission.  Had known liver mass suspicious for malignancy.  CT chest w/ innumerable pulmonary nodules.   Seen by Hepatology and Oncology with plan to follow up OP and establish plan of care and treatment options.  Per Oncology,  will contact pt/spouse, clinic number provided in AVS if no appointment established in 2-3 days.  Seen by Palliative Care on day of discharge, pt is not interested in services at this time, but his wife was provided with information/resources if pt is interested in Palliative Care clinic in the future.     # Severe COPD  # CAP   Stable, improving.  Discharged with Augmentin BID to complete 10 day course.  Continued on home regimen with Advair, Albuterol, and resumed Rx for home O2.      # Abnormal LFTs - AST elevated at 182 on day of discharge, though this has consistently trended down.  Per GI, elevations likely 2/2 liver mass.  Recheck CMP with PCP in 5-7 days.     # HTN - Stable.  Not currently on any antihypertensives    # Depression - Stable.  Declining further intervention/discussion.      # Discharge Pain Plan:   - Patient currently has NO PAIN and is not being prescribed pain medications on discharge.    Consultations This Hospital Stay   VASCULAR ACCESS CARE ADULT IP CONSULT  VASCULAR ACCESS CARE ADULT IP CONSULT  VASCULAR ACCESS CARE ADULT IP CONSULT  VASCULAR ACCESS CARE ADULT IP CONSULT  ONCOLOGY IP CONSULT  SPIRITUAL HEALTH SERVICES IP CONSULT  PALLIATIVE CARE ADULT IP CONSULT     Code Status    Full Code    Time Spent on this Encounter   I, Sydneyefrain Joyce, personally saw the patient today and spent greater than 30 minutes discharging this patient.       Sydney Joyec  Internal Medicine Staff Hospitalist Service  [facility]  Pager: [phone]  ______________________________________________________________________    Physical Exam   Vital Signs: Temp: 99.1  F (37.3  C) Temp src: Oral BP: 136/84 Pulse: 89 Heart Rate: 94 Resp: 18 SpO2: 92 % O2 Device: None (Room air) Oxygen Delivery: 1 LPM  Weight: 143 lbs 3.2 oz    GENERAL: Alert and oriented x 3. Well nourished, well developed.  Anxious and wanting to leave.     HEENT: Normocephalic, atraumatic. Anicteric sclera. Mucous membranes moist.   CV: RRR. S1, S2. No murmurs appreciated.   RESPIRATORY: Effort normal on room air. R lung with trace rhonchi.   GI: Abdomen soft and non distended, bowel sounds present x all 4 quadrants. No tenderness, rebound, or guarding.   NEUROLOGICAL: No focal deficits. Follows commands.  Strength 5/5 in upper and lower extremities.   MUSCULOSKELETAL: No joint swelling or tenderness. Moves all extremities.   EXTREMITIES: No gross deformities. No peripheral edema.   SKIN: Grossly warm, dry, and intact. No jaundice. No rashes.       Significant Results and Procedures   Most Recent 3 CBC's:  Recent Labs   Lab Test  05/14/18   0934  05/14/18 0418  05/13/18   2142   05/13/18   0951   05/12/18   0432   WBC   --   9.4   --    --   10.3   --   12.9*   HGB  9.0*  8.2*  8.3*   < >  8.0*  8.0*   < >  8.5*   MCV   --   94   --    --   96   --   97   PLT   --   252   --    --   201   --   199    < > = values in this interval not displayed.     Most Recent 3 BMP's:  Recent Labs   Lab Test  05/14/18 0418  05/13/18   0429  05/12/18   0432   NA  137  136  138   POTASSIUM  3.7  3.6  4.1   CHLORIDE  103  103  103   CO2  28  28  29   BUN  9  7  10   CR  0.54*  0.49*  0.53*   ANIONGAP  6  6  6   JACKELIN  8.2*  7.7*  8.1*   GLC   101*  95  87     Most Recent 2 LFT's:  Recent Labs   Lab Test  05/14/18   0418  05/13/18   0429   AST  182*  261*   ALT  59  63   ALKPHOS  102  96   BILITOTAL  1.6*  1.4*     Most Recent 3 INR's:  Recent Labs   Lab Test  05/13/18   0429  05/09/18   0945  03/19/18   1151   INR  1.15*  0.98  0.89   ,   Results for orders placed or performed during the hospital encounter of 05/09/18   IR Visceral Angiogram    Narrative    Procedures 5/9/2018:    1. Left common femoral arteriotomy.  2. Selective catheterization and angiography of the contralateral  right external iliac artery.  3. Selective catheterization and angiography of the contralateral  right inferior epigastric artery.  4. Empiric embolization of the contralateral right inferior epigastric  artery.  5. Selective catheterization and angiography of the contralateral  right circumflex iliac artery.    History: 63-year-old male with history of large right lobe liver mass  with spontaneous hemorrhage previously treated with bland  embolization. Patient returned to interventional radiology today for  repeat biopsy with postprocedural findings concerning for hemorrhage.  CTA performed which demonstrated active extravasation from what  appeared to be a branch vessel of the right inferior epigastric  artery. Patient taken to interventional radiology for urgent  embolization.    Comparison: CTA earlier same day    Staff: BERTA Ponce MD    Fellow: REJI Stearns MD    Medications:   1. 25 mcg Fentanyl  2. 0.5 mg Versed    Moderate sedation administered by the IR nurse at the supervision of  the attending. Vital signs and oxygenation continuously monitored. The  patient remained stable throughout the procedure.    Sedation time: 90 minutes of direct face-to-face evaluation    Fluoroscopy time: 17 minutes    Contrast: 50 cc Ultravist 320    Findings/procedure: Prior to the procedure, written and verbal  informed consent were obtained from the patient. The patient was  placed in  the supine  position on the fluoroscopic examination table  and the right groin  was prepped and draped in the usual sterile  fashion. Limited preprocedural ultrasound demonstrated widely patent  right  common femoral artery  with suitable positioning for access. 1%  lidocaine without epinephrine used for local anesthesia. Under  ultrasound guidance, micropuncture needle was advanced into the right   common femoral artery  and key ultrasound image demonstrating needle  tip in the vessel lumen was saved and archived to PACS. The  micropuncture needle was exchanged for a 5  Bulgarian 10 cm sheath using  modified Seldinger technique.    5 Bulgarian VCF flush catheter was advanced to the lower abdominal aorta  and used to catheterize the right common iliac artery. Catheter passed  over the wire to the level of the acetabulum and wire was exchanged  for 0.035 Amplatz stiff wire. Sheath removed and exchanged for a 6  Bulgarian 45 cm Terumo destination sheath which was advanced to the  distal right external iliac artery. Angiogram performed from the  sheath demonstrates widely patent right common, external, and internal  iliac arteries as well as common femoral artery. This also  demonstrated patent proximal superficial and deep femoral arteries.  Also demonstrated were origins of both the deep circumflex iliac and  inferior epigastric arteries.    Angled glide catheter was advanced over the wire to the distal  external iliac artery and used to catheterize the right inferior  epigastric artery. Angiography performed demonstrating patent anatomy  without clear evidence of active extravasation. 2.8 Bulgarian Terumo  Progreat catheter and fathom guidewire were used to select and  catheterize the distal inferior epigastric artery. Angiography  performed in multiple projections demonstrated no clear evidence of  active extravasation, though this did supply the area of extravasation  seen on previous CTA. Given high suspicion for hemorrhage  from this  location, empiric embolization performed with approximately 8 cc  Gelfoam slurry to stasis. Stasis of flow confirmed with repeat  contrast injection from the microcatheter.    Micro-catheter was removed and a 5 Fijian angled glide catheter was  then used to selectively catheterize the right deep circumflex iliac  artery. Angiography of this vessel performed demonstrating no evidence  of active extravasation, and provided vascular supply to an area of  the abdominal wall not involved with hemorrhage.    Catheters removed. Destination sheath removed and exchanged over the  wire for 6 Fijian 10 cm vascular sheath. Arteriotomy closure performed  with OfficeDrop  arteriotomy closure device.      Impression    Impression:    1. Contralateral right external iliac angiogram demonstrates widely  patent common, internal, external iliac arteries and branches.  2. Selective catheterization and angiography of the contralateral  right inferior epigastric artery demonstrates no evidence of active  extravasation, however this does supply the area of abdominal wall or  bleed as seen on CTA.  3. Empiric Gelfoam embolization performed of the right inferior  epigastric artery to stasis.  4. Selective catheterization and angiography of the right deep  circumflex iliac artery demonstrates no evidence of active  extravasation and supplies a portion of the abdominal wall away from  previously seen hemorrhage.    Plan:   Patient to be admitted to unit 6B for close hemodynamic monitoring.  Bedrest with right leg straight for 3 hours. I'll continue to follow.  Please call with any questions or concerns.    I have personally reviewed the examination and initial interpretation  and I agree with the findings.    JESSIE LERNER MD   CT Abdomen Pelvis w Contrast     Value    Radiologist flags Active extravasation (AA)    Narrative    EXAMINATION: CT ABDOMEN PELVIS W CONTRAST, 5/9/2018 5:03 PM    TECHNIQUE:  Helical CT images from the  lung bases through the  symphysis pubis were obtained with IV contrast. Contrast dose: isovue  370    COMPARISON: CT calf 2/15/2018    HISTORY: evaluate for complication s/p biopsy-abd  pain;     FINDINGS:    Abdomen and pelvis: Redemonstration of inferior right hepatic lobe  heterogeneously enhancing mass which demonstrates arterial enhancement  and progressive centripetal filling on subsequent venous and portal  venous phase images. Numerous tortuous arterial vessels course  throughout this lesion. Overall the lesion is not significantly  changed in size or appearance when compared to MRI dated 4/25/2018.    There are new changes of omental nodule biopsy in the right upper  quadrant anterior to the liver capsule and the liver mass with  multiple small foci of gas in the abdominal wall fat (series 16 image  178). On comparison examinations there was a small amount of simple  free fluid about the liver and upper abdomen. On the current exam,  there is a moderate amount of complex fluid with focally hyperdense  fluid about the omental nodule biopsy site extending into the right  paracolic gutter, compatible with hemorrhage. On arterial and early  venous phase images there is a suspicious vessel arising from the  anterior abdominal wall of the right upper quadrant (series 10 image  353). On delayed portal venous phase images there is active  extravasation into a portion of the perihepatic bleed with layering  contrast material (series 13 image 385).    No dilated small or large bowel. No gross free air, pneumatosis, or  portal venous gas. Sigmoid diverticulosis without diverticulitis.  Normal prostate gland and urinary bladder. No hydronephrosis.  Bilateral simple renal cysts. Otherwise normal kidneys. Normal spleen,  adrenals, pancreas, and gallbladder. Unchanged small sliding-type  hiatal hernia. Grossly unchanged appearance to multiple omental  nodules suspicious for metastatic disease.    Lung bases: Multiple new  right lower lobe pulmonary nodules measuring  9 mm (series 13 image 38) in the medial right lower lobe, 8 mm (series  13 image 38) in the mid posterior right lower lobe, and 5 mm (series  13 image 36) immediately adjacent. Wall thickening and mucous plugging  in the bronchi of the right lower lobe suggestive these may be  infectious or inflammatory in etiology. Other small nodules seen  previously in the left lower lobe have resolved.    Bones and soft tissues: No acute or suspicious osseous lesion. Soft  tissues of the abdominal walls are within normal limits.      Impression    IMPRESSION:   1. Postbiopsy changes for right upper quadrant anterior omental nodule  with evidence of active hemorrhage with increased volume of now  moderate complex free fluid about the liver and throughout the  abdomen.  2. Grossly unchanged appearance to large, heterogeneously enhancing  right hepatic lobe mass and omental nodularity suspicious for  malignancy with metastatic disease.  3. New right lower lobe nodules measuring up to 8 mm in diameter.  Associated bronchial wall thickening and mucous plugging in the right  lower lobe suggests that these may be infectious or inflammatory  etiology although they remain highly suspicious for metastatic disease  and short interval follow-up is recommended to evaluate for  stability/resolution.  4. Tree-in-bud opacities and nodules in the left lower lobe have  resolved, compatible with resolution of prior infectious or  inflammatory process.    [Critical Result: Active extravasation]    Finding was identified on 5/9/2018 5:05 PM.     Dr. Stearns was contacted by Dr. Johnson at 5/9/2018 5:09 PM and  verbalized understanding of the critical finding.     I have personally reviewed the examination and initial interpretation  and I agree with the findings.    NABILA ESCAMILLA MD   CT Abdomen/Pelvis Angio wo & w Contrast     Value    Radiologist flags (Urgent)     Possible small  pseudoaneurysm, which is a potential    Narrative    Exam: Computed tomographic angiography of the abdomen without and with  contrast, including 3D reformations dated 5/10/2018 3:35 PM    Clinical information:  concern for intraabdominal bleed s/p procedure;      Technique: Helical scans through the abdomen and obtained before the  administration of intravenous contrast media and following the  injection of contrast media an arterial and delayed phases. Source  images reviewed as well as 3D and multi-planar reconstructions.    Contrast: 100cc Isovue 370    DLP: 3153 mGy*cm    Comparison study: CT angiogram from the prior day, CT 2/15/2018, MRI  4/25/2018.    Findings:   Vascular:  No definite focus of extraluminal contrast extravasation is  identified. There is a cluster of prominent vessels in the region of  the prior biopsy, with a small 3 mm focus of contrast enhancement near  the prominent omental nodule (series 5 image 331) which does not  demonstrate clear blooming on the delayed phase images. The vessel  adjacent to the small contrast blush appears trace back to the  gastroduodenal artery. No other possible focus of acute contrast  extravasation is identified. There are mild atherosclerotic  calcifications in the aorta, without focal aneurysm or stenosis. The  celiac artery is patent. The hepatic arterial configuration is  conventional. The SMA is widely patent at its origin and into the  distal visualized branches. A single right and a single left renal  artery appear patent. Visualized iliac vessels demonstrate  atherosclerotic calcification without significant stenosis.    Abdomen/pelvis:   Since the CT comparison on the prior day there is been mild interval  increase in the amount of mildly heterogeneous hyperdense fluid within  the abdomen, representing an increase in hemoperitoneum. No  significant change in the appearance of numerous arterially enhancing  lesions throughout the right lobe with the  liver which progressive  enhancement through the delayed phases. The spleen, pancreas, adrenal  glands remain within normal limits. No significant change in the  scattered of peritoneal nodularity including a previously biopsied  right mid abdominal peritoneal nodule as well as numerous other small  omental and mesenteric nodules. Diverticulosis. No large or small  bowel dilation. Biliary excretion of contrast with contrast within the  gallbladder lumen. Nonenhancing cystic lesion in the posterior left  kidney compatible with simple renal cyst. Other subcentimeter lesions  too small to characterize, most likely simple renal cyst. Enlarged and  prominent mesenteric lymph nodes are present throughout. No  retroperitoneal or inguinal lymphadenopathy.    Lung bases:  Numerous solid appearing right basilar pulmonary nodules are again  identified, which appears similar to the most recent CT comparison,  but are new from the chest CT on 2/15/2018.    Bones and soft tissues:  Stable vertically trabeculated lesion in the T11 vertebral body is  compatible with a benign vertebral body hemangioma. There are  degenerative changes in the lumbar spine. Anterior wedge compression  deformity of T12 with Schmorl's node deformity also not significantly  changed.      Impression    Impression:  1. Small focus of contrast enhancement adjacent to the biopsied nodule  which does not demonstrate blooming on the delayed phase. This could  represent a small pseudoaneurysm or a small spastic vessel. Although  no definite contrast extravasation is identified, this is the most  suspicious finding that could be related to persistent hemorrhage.  Vessels feeding this focus likely arise from branch vessels off the  gastroduodenal artery.  2. Since the prior day, there has been a small interval increase in  hemoperitoneum. Again this is not definitive, as it could represent  hemorrhage that occurred between the time of the CT on the  embolization  from the prior day, or could represent more recent or  ongoing hemorrhage.  3. No significant change in the large heterogeneous arterially  enhancing lesions within the liver, with findings worrisome for  metastatic disease including peritoneal and omental nodularity as well  as solid right basilar pulmonary nodules. Statistically, this is most  likely represent hepatocellular carcinoma, although not entirely  characteristic.      [Urgent Result: Possible small pseudoaneurysm, which is a potential  source of persistent intra-abdominal hemorrhage.]    Finding was identified on 5/10/2018 3:42 PM.     KATHRIN Moreno was contacted by Dr. Bean at 5/10/2018 4:41 PM and  verbalized understanding of the urgent finding.     I have personally reviewed the examination and initial interpretation  and I agree with the findings.    BAKARI COFFEY MD   IR Visceral Angiogram    Narrative    Procedures 5/11/2018:    1. Ultrasound guided right common femoral arteriotomy.  2. Selective catheterization and angiography of the common hepatic  artery.  3. Selective catheterization and angiography right hepatic artery.  4. Selective catheterization and angiography of the gastroduodenal  artery.    History: 63-year-old male with newly diagnosed metastatic  hepatocellular carcinoma and postbiopsy bleeding A2 following appears  embolization of the right inferior epigastric artery. Serial  hemoglobin checks demonstrate continued hemoglobin drops. Patient  remains stable, however given continued drop in hemoglobin, decision  was made to perform repeat angiography to evaluate for other sources  of bleeding.    Comparison: CT angiogram previous day.    Staff: REJI Li MD    Fellow: REJI Stearns MD    Medications:   1. 250 mcg Fentanyl  2. 5 mg Versed    Moderate sedation administered by the IR nurse at the supervision of  the attending. Vital signs and oxygenation continuously monitored. The  patient remained stable throughout the  procedure.    Sedation time: 100 minutes of direct face-to-face evaluation    Fluoroscopy time: 25 minutes    Contrast: 100 cc Visipaque 320    Findings/procedure: Prior to the procedure, written and verbal  informed consent were obtained from the patient. The patient was  placed in the supine  position on the fluoroscopic examination table  and the right groin  was prepped and draped in the usual sterile  fashion. Limited preprocedural ultrasound demonstrated widely patent  right  common femoral artery  with suitable positioning for access. 1%  lidocaine without epinephrine used for local anesthesia. Under  ultrasound guidance, micropuncture needle was advanced into the right   common femoral artery  and key ultrasound image demonstrating needle  tip in the vessel lumen was saved and archived to PACS. The  micropuncture needle was exchanged for a 5  Malay 10 cm vascular  sheath using modified Seldinger technique.    0.035 Bentson guidewire and C2 catheter advanced to the abdominal  aorta and used to selectively catheterize the celiac artery. Catheter  and 0.035 angle Glidewire then advanced the right hepatic artery.    Hepatic angiogram performed demonstrating enhancement of the large  right lobe tumor mass without evidence of active extravasation.  Catheter was retracted and limited angiogram from the celiac artery  performed demonstrating patent vasculature with a proximal small  vessel which appeared to progress to the right upper quadrant. This is  selectively catheterized with the C2 catheter and angiogram performed  demonstrated likely duplicated after duodenal artery supplies the  right upper quadrant tumor/hepatic flexure of the colon.    This was in the area previously seen bleed artery was selectively  catheterized with a direction microcatheter and wire angiography  performed from this location demonstrates no clear evidence of tumor  supply.    Microcatheter removed and base catheter placed in the  common hepatic  artery. Power injection performed from this location demonstrated  patent vasculature with large right lobe tumor mass again noted with 2  canal and branch vessels from the common hepatic artery, presumably  gastroduodenal artery and gastroepiploic artery. 2 branch vessels are  noted to progress to the right in the area of previously seen  hemorrhage. The previously catheterize branch of the gastroduodenal  artery was again catheterized and selective injection performed which  demonstrated no clear evidence of active extravasation.    At this point the procedure, the patient had become progressively more  agitated and required with upper and lower extremity restraints as  well as physical restraint by support staff. Despite restraints and  frequent renal orientation by nursing, the patient managed to dislodge  the right common femoral sheath which was able to replaced over the  wire. Given inability to tolerate the procedure, decision was made to  end the procedure.    Catheter removed. Wire maintained for access in case she became  dislodged again. Arteriotomy closure performed with Starclose device.  No immediate complication.      Impression    Impression:    1. Selective catheterization and angiography of the common hepatic  artery demonstrates known large right lobe tumor mass with 2 downgoing  branch vessels from the common hepatic artery, presumably a  gastroepiploic and gastroduodenal arteries.   2. Selective catheterization and angiography of the gastroduodenal  artery and subselective branches directed towards the right upper  quadrant demonstrated no clear evidence of active extravasation.  3. Selective catheterization and angiography of the right hepatic  artery demonstrates supply to large right lobe hepatic tumor.    Plan:   Patient to floor for continued cares. Continue close hemodynamic  monitoring. Patient decompensates and hemodynamic standpoint, repeat  angiography could be  performed though would require general anesthesia  given intolerance to moderate sedation.   CT Chest w/o Contrast    Narrative    EXAMINATION: Chest CT 5/12/2018 2:53 PM.    CLINICAL HISTORY: leukocytosis, new diagnosis HCC w/ mets- eval for  PNA, pulmonary mets;     COMPARISON: Outside chest CT 1/17/2018    TECHNIQUE: Acquisition of CT images through the chest without  contrast. Coronal and axial MIP reformatted images obtained.    FINDINGS:    The thyroid is unremarkable. No axillary lymphadenopathy. A few small  mediastinal nodes. The central tracheobronchial tree is patent. The  heart is not enlarged. No pericardial effusion. No pleural effusion.    Multiple pulmonary nodules (greater than 20), bilateral but  predominantly in the right lower lobe. The largest lesion measures 1.4  cm. Small area of groundglass opacity along the peripheral aspect of  the right middle lobe. There are tree in bud opacities in the right  upper and right lower lobes.     Limited evaluation of the upper abdomen. Large right hepatic lobe mass  and peritoneal implants consistent with known HCC is better evaluated  on CT abdomen 5/9/2018.    No worrisome osseous abnormality.      Impression    IMPRESSION:   1. Innumerable pulmonary nodules measuring up to 1.4 cm, consistent  with metastatic disease.  2. Small area of groundglass opacity along the peripheral aspect of  the right middle lobe and tree in bud opacities; differential includes  metastatic disease versus infection.  3. Liver mass and peritoneal carcinomatosis    I have personally reviewed the examination and initial interpretation  and I agree with the findings.    MARK ANTHONY ROSS MD       Pending Results   N/a   Unresulted Labs Ordered in the Past 30 Days of this Admission     No orders found from 3/10/2018 to 5/10/2018.             Primary Care Physician   Jonna Clements    Discharge Disposition   Discharged to home  Condition at discharge: Stable    Discharge Orders   No  discharge procedures on file.  Discharge Medications   Current Discharge Medication List      CONTINUE these medications which have NOT CHANGED    Details   aspirin 81 MG tablet Take 81 mg by mouth daily      fluticasone (FLONASE) 50 MCG/ACT spray Spray 2 sprays into both nostrils daily      ipratropium - albuterol 0.5 mg/2.5 mg/3 mL (DUONEB) 0.5-2.5 (3) MG/3ML neb solution Take 1 vial (3 mLs) by nebulization 4 times daily  Qty: 360 mL, Refills: 0    Associated Diagnoses: Chronic obstructive pulmonary disease, unspecified COPD type (H)      loratadine (CLARITIN) 10 MG tablet Take 1 tablet (10 mg) by mouth daily  Qty: 30 tablet, Refills: 0    Associated Diagnoses: Chronic obstructive pulmonary disease, unspecified COPD type (H); Acute seasonal allergic rhinitis, unspecified trigger      albuterol (PROAIR HFA/PROVENTIL HFA/VENTOLIN HFA) 108 (90 BASE) MCG/ACT Inhaler Inhale 2 puffs into the lungs every 4 hours as needed for shortness of breath / dyspnea or wheezing      fluticasone-salmeterol (ADVAIR) 250-50 MCG/DOSE diskus inhaler Inhale 1 puff into the lungs 2 times daily  Qty: 1 Inhaler, Refills: 0    Associated Diagnoses: Chronic obstructive pulmonary disease, unspecified COPD type (H)      thiamine 100 MG tablet Take 1 tablet (100 mg) by mouth daily  Qty: 30 tablet, Refills: 0    Associated Diagnoses: Alcohol abuse           Allergies   Allergies   Allergen Reactions     Mold Unknown     Other reaction(s): Other  Inability to breathe     Tramadol Unknown, Other (See Comments), Diarrhea, Rash and Nausea and Vomiting

## 2018-05-14 NOTE — PROGRESS NOTES
Interventional Radiology Progress Note     May 13, 2018    Subjective: Mr Ferrer is without complaint this morning and states he feels well.     Objective: /84 (BP Location: Right arm)  Pulse 89  Temp 99.1  F (37.3  C) (Oral)  Resp 18  Wt 65 kg (143 lb 3.2 oz)  SpO2 92%  BMI 24.58 kg/m2    Results for orders placed or performed during the hospital encounter of 05/09/18 (from the past 24 hour(s))   Hemoglobin   Result Value Ref Range    Hemoglobin 8.0 (L) 13.3 - 17.7 g/dL   CBC with platelets   Result Value Ref Range    WBC 10.3 4.0 - 11.0 10e9/L    RBC Count 2.68 (L) 4.4 - 5.9 10e12/L    Hemoglobin 8.0 (L) 13.3 - 17.7 g/dL    Hematocrit 25.6 (L) 40.0 - 53.0 %    MCV 96 78 - 100 fl    MCH 29.9 26.5 - 33.0 pg    MCHC 31.3 (L) 31.5 - 36.5 g/dL    RDW 15.8 (H) 10.0 - 15.0 %    Platelet Count 201 150 - 450 10e9/L   Hemoglobin   Result Value Ref Range    Hemoglobin 8.4 (L) 13.3 - 17.7 g/dL   Hemoglobin   Result Value Ref Range    Hemoglobin 8.3 (L) 13.3 - 17.7 g/dL   CBC with platelets   Result Value Ref Range    WBC 9.4 4.0 - 11.0 10e9/L    RBC Count 2.76 (L) 4.4 - 5.9 10e12/L    Hemoglobin 8.2 (L) 13.3 - 17.7 g/dL    Hematocrit 25.8 (L) 40.0 - 53.0 %    MCV 94 78 - 100 fl    MCH 29.7 26.5 - 33.0 pg    MCHC 31.8 31.5 - 36.5 g/dL    RDW 15.8 (H) 10.0 - 15.0 %    Platelet Count 252 150 - 450 10e9/L   Comprehensive metabolic panel   Result Value Ref Range    Sodium 137 133 - 144 mmol/L    Potassium 3.7 3.4 - 5.3 mmol/L    Chloride 103 94 - 109 mmol/L    Carbon Dioxide 28 20 - 32 mmol/L    Anion Gap 6 3 - 14 mmol/L    Glucose 101 (H) 70 - 99 mg/dL    Urea Nitrogen 9 7 - 30 mg/dL    Creatinine 0.54 (L) 0.66 - 1.25 mg/dL    GFR Estimate >90 >60 mL/min/1.7m2    GFR Estimate If Black >90 >60 mL/min/1.7m2    Calcium 8.2 (L) 8.5 - 10.1 mg/dL    Bilirubin Total 1.6 (H) 0.2 - 1.3 mg/dL    Albumin 2.5 (L) 3.4 - 5.0 g/dL    Protein Total 6.9 6.8 - 8.8 g/dL    Alkaline Phosphatase 102 40 - 150 U/L    ALT 59 0 - 70 U/L      (H) 0 - 45 U/L   Procalcitonin   Result Value Ref Range    Procalcitonin 0.48 ng/ml     Hemoglobin   Date Value Ref Range Status   05/14/2018 8.2 (L) 13.3 - 17.7 g/dL Final   05/13/2018 8.3 (L) 13.3 - 17.7 g/dL Final   05/13/2018 8.4 (L) 13.3 - 17.7 g/dL Final   05/13/2018 8.0 (L) 13.3 - 17.7 g/dL Final   05/13/2018 8.0 (L) 13.3 - 17.7 g/dL Final       Intake/Output Summary (Last 24 hours) at 05/14/18 0847  Last data filed at 05/14/18 0400   Gross per 24 hour   Intake              480 ml   Output             2575 ml   Net            -2095 ml       Imaging: No interval imaging    Physical Exam:  Gen: In bed, alert  Chest: RRR, wheezing bilaterally, unchanged from previous. No rhochi or rales.  Abdomen: Soft, nondistended. TTP along right abdomen No periotoneal signs.  Extremities: No edema.  Neuro: AAOx3    Assessment/Plan: 63 year old yo M POD#5 following right peritoneal biopsy with postprocedure bleeding with empiric embolization of the right inferior epigastric artery. POD#3 following repeat angiogram which was not completed to due intolerance of moderate sedation, though did not show convincing evidence of active bleed. Biopsy results and chest CT with newly diagnosed metastatic HCC.    - Clinically doing well, no signs/symptoms of continued bleeding  - Hemoglobin stable for 3 days  - Imaging and clinical course reviewed in IR rounds, felt to be at extremely low risk for continued bleeding  - Patient ready for discharge from an IR perspective. Further followup per oncology for newly diagnosed metastatic HCC.     Antoine Stearns  Fellow, Interventional Radiology      [phone]  [phone] After Hours

## 2018-05-14 NOTE — PLAN OF CARE
Problem: Patient Care Overview  Goal: Plan of Care/Patient Progress Review  Outcome: Improving  Neuro: A&Ox4. Anxious.   Cardiac: SR, 1st degree AV block.   Vitals:  Temp: 99  F (37.2  C) Temp src: Oral BP: 132/83 Pulse: 89 Heart Rate: 87 Resp: 18 SpO2: 93 % O2 Device: Nasal cannula Oxygen Delivery: 1 LPM  Respiratory: Sating adequately on 1 LPM NC.  Denies any change in respiratory status.   GI/: Good urine output..   Diet/appetite: Tolerating regular diet. Eating well.  Activity:  Up indepedently  Pain: At acceptable level on current regimen.   Skin: No new deficits noted.   Plan: Continue with POC. Notify primary team with changes.  Anticipate DC later today.     Problem: Chronic Respiratory Difficulty Comorbidity  Goal: Chronic Respiratory Difficulty  Patient comorbidity will be monitored for signs and symptoms of Respiratory Difficulty (Chronic) condition.  Problems will be absent, minimized or managed by discharge/transition of care.   Outcome: Improving  O2 via nasal cannula,1 LPM nasal cannula.

## 2018-05-14 NOTE — CONSULTS
[facility], [place]  Palliative Care Consultation Note    Patient: Naseem Ferrer  Date of Admission:  5/9/2018    Requesting provider/team: Rhonda Moreno PA-C / [facility] Team  Reason for consult:  Goals of care     Patient and family support    Recommendations:  Ongoing goals of care conversations are appropriate, and would be appreciated by the patient's wife, Miya. At this time Naseem is not interested in these discussions, but understands his wife is and will think about going to palliative clinic in the future.    -Would be a good candidate for Palliative LICSW to assist with adjustment to illness and advanced care planning, particularly related to his 8 year old daughter       These recommendations have been discussed with Sydney Joyce CNP with [facility].    Thank you for the opportunity to participate in the care of this patient and family. No follow up planned from our team currently; patient will reach out if they are open to future visits. Please feel free to contact the on-call Palliative provider with any urgent needs.     SULEIMAN Cavanaugh CNP  Palliative Care Consult Team  Pager: [phone]    [facility] Inpatient Team Consult pager [phone] (M-F 8-4:30)  After-hours Answering Service [phone]   Palliative Clinic: [phone]     70 minutes spent with patient, with >50% counseling and in care coordination.    Assessment:  Naseem Ferrer is a 63 year old male with newly diagnosed metastatic HCC.     I met with Saqib, Miya, and their close family friend, Erica, today to introduce the Palliative Care team and services; such as symptom management, assistance navigating chronic illness and goals for treatment, counseling, psychosocial and spiritual support. Miya was very interested in counseling and preparing their daughter for the future, as she understands Saqib will eventually die from his liver cancer. Saqib and Erica did not think our services seemed  "helpful, which seemed to create some tension with Miya. He said he has always told Sondra that he is \"old dad\" and will not be around forever, and that she is very smart and will be fine. Saqib ultimately agreed to think about outpatient services, if things don't go well \"his way\" with Sondra. I left a card with the Palliative clinic number for them and they will reach out if they are open to follow up.     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 They endorse a lot of unknown about timing and are hopeful it is years and not months.     Advance Care Planning:               Decision making capacity: intact        Disease understanding: understands he has Hep C and now liver cancer        Goals of Care: life-prolonging treatment; being at home as much as possible        Health care directive: not on file        Health care agent: next of kin is wife, Miya Ferrer        Code Status:  Full Code       no POLST (Physician orders for life-sustaining treatment) form on file    History of Present Illness   Sources of History: patient, electronic health record and patient's spouse    Naseem Ferrer is a 63 year old male with a past medical history of COPD (on 6L home O2), HTN, depression, HCV, and etoh misuse. He was admitted on 5/9 for a liver biopsy in IR, with post op course being complicated by bleeding which required urgent embolization. During " this hospitalization he has newly diagnosed stage IV HCC with innumerable pulmonary nodules.      ROS:  A comprehensive ROS has been negative other than stated in the HPI and below:  Palliative Symptom Review (0=no symptom/no concern, 1=mild, 2=moderate, 3=severe):  Patient declined to complete ROS        Past Medical History:   Past Medical History:   Diagnosis Date     COPD (chronic obstructive pulmonary disease) (H)     very severe. O2 dependent, h/o intubation. frequent exacerbations.     Depression      Hypertension      Liver lesion     liver mass concerning for HCC, dx not confirmed; adm with hemorrhage from lesion s/p embolization          Past Surgical History:   Past Surgical History:   Procedure Laterality Date     CARPAL TUNNEL RELEASE RT/LT       ORTHOPEDIC SURGERY       ROTATOR CUFF REPAIR RT/LT               Family History:   Family History   Problem Relation Age of Onset     LUNG DISEASE Mother      CANCER Father      unknown      CANCER Sister      lung          Allergies:   Allergies   Allergen Reactions     Mold Unknown     Other reaction(s): Other  Inability to breathe     Tramadol Unknown, Other (See Comments), Diarrhea, Rash and Nausea and Vomiting            Medications:   I have reviewed this patient's medication profile and medications given in the past 24 hours.    Hydromorphone 1 mg PO q6h prn--4 mg/24 hrs   Ondansetron 4 mg q6h prn--0           Physical Exam:   Vital Signs: Temp: 99.1  F (37.3  C) Temp src: Oral BP: 136/84 Pulse: 89 Heart Rate: 94 Resp: 18 SpO2: 92 % O2 Device: None (Room air) Oxygen Delivery: 1 LPM  Weight: 143 lbs 3.2 oz    Constitutional: Appears stated age. Seen siting at the edge of bed, eating breakfast. Frail appearing, but in no acute distress.  Head: Normocephalic. Atraumatic. Face symmetrical. MMM.   Extremities: No edema.  Pulm: Non-labored breathing. Speaking in complete sentences.    Musculoskeletal: COLLINS. Strength 5/5. Gait steady.    Skin: Jaundice. No  concerning rashes or lesions on exposed areas.   Neuropsych: A/O x 4. Speech clear and fast paced; tangential at times. Memory intact. Impulsive.      Data reviewed:     CMP  Recent Labs  Lab 05/14/18 0418 05/13/18  0429 05/12/18  0432 05/11/18  1740 05/10/18  0425 05/09/18  1630    136 138 140 138 137   POTASSIUM 3.7 3.6 4.1 4.3 4.7 4.3   CHLORIDE 103 103 103  --  108 104   CO2 28 28 29  --  20 25   ANIONGAP 6 6 6  --  10 8   * 95 87 87 115* 227*   BUN 9 7 10  --  23 16   CR 0.54* 0.49* 0.53*  --  0.77 0.85   GFRESTIMATED >90 >90 >90  --  >90 >90   GFRESTBLACK >90 >90 >90  --  >90 >90   JACKELIN 8.2* 7.7* 8.1*  --  8.2* 8.4*   MAG  --   --   --   --   --  2.2   PROTTOTAL 6.9 6.5* 7.2  --  7.0 7.7   ALBUMIN 2.5* 2.4* 2.7*  --  2.8* 3.1*   BILITOTAL 1.6* 1.4* 1.2  --  0.6 0.6   ALKPHOS 102 96 115  --  100 126   * 261* 526*  --  183* 151*   ALT 59 63 92*  --  72* 69     CBC  Recent Labs  Lab 05/14/18  0934 05/14/18 0418 05/13/18  2142 05/13/18  1620 05/13/18  0951  05/12/18  0432  05/10/18  0425   WBC  --  9.4  --   --  10.3  --  12.9*  --  20.9*   RBC  --  2.76*  --   --  2.68*  --  2.87*  --  3.55*   HGB 9.0* 8.2* 8.3* 8.4* 8.0*  8.0*  < > 8.5*  < > 10.7*   HCT  --  25.8*  --   --  25.6*  --  27.9*  --  33.5*   MCV  --  94  --   --  96  --  97  --  94   MCH  --  29.7  --   --  29.9  --  29.6  --  30.1   MCHC  --  31.8  --   --  31.3*  --  30.5*  --  31.9   RDW  --  15.8*  --   --  15.8*  --  17.0*  --  15.9*   PLT  --  252  --   --  201  --  199  --  282   < > = values in this interval not displayed.    INR  Recent Labs  Lab 05/13/18  0429 05/09/18  0945   INR 1.15* 0.98     Arterial Blood Gas  Recent Labs  Lab 05/11/18  1740   PH 7.26*   PCO2 57*   PO2 83   HCO3 26

## 2018-05-14 NOTE — PROGRESS NOTES
Care Coordinator Progress Note    Admission Date/Time:  5/9/2018  Attending MD:  Robert Snyder MD    Data  Chart reviewed, discussed with interdisciplinary team.   Patient was admitted for: Data Unavailable.    Concerns with insurance coverage for discharge needs: None.  Current Living Situation: Patient lives with spouse.  Support System: Supportive and Involved  Services Involved: DME  Transportation at Discharge: Family or friend will provide  Transportation to Medical Appointments:  - self  Barriers to Discharge: chronically ill and pending medical clearance    Coordination of Care and Referrals: Provided patient/family with options for DME.        Assessment  5/14: RNCC spoke with bedside nurse, Armin, patient is on RA and doing quite well (occasionally he has been using 1L according to RN Armin). Patients wife did not bring home oxygen tank for transport. Offered to call [facility] and have portable O2 tank delivered to hospital for the trip home and patient declines. No further RNCC needs.    5/10: 2 yo with severe COPD, large liver mass, who presents for admission following liver biopsy of mass. Developed pain after procedure. Hgb dropped 2 grams. CT showed bleed so went back for embolization.  Spoke with patient at bedside.  Introduced RNCC role and discharge planning.  Patient lives with his wife and has been independent with activity.  States his wife could provide assistance at home if needed after discharge.  Patient and his wife do in home , so patient states he is able to step away for a break anytime.  Patient currently [facility] 6 liters [facility]  He states his wife will provide transportation upon discharge and can bring his portable O2 at that time.  Will follow for discharge needs.    ___________________________  [facility]  Phone  [phone]  Fax  [phone]  ___________________________     Plan  Anticipated Discharge Date:  today  Anticipated Discharge Plan:  home    Floresita CERONN RN PHN  Patient Care Management Coordinator  [facility], and [facility]  Phone: [phone] / Pager: [phone]

## 2018-05-17 NOTE — LETTER
5/17/2018       RE: Naseem Ferrer  [address]  [facility] [id number]     Dear Colleague,    Thank you for referring your patient, Naseem Ferrer, to the [facility]. Please see a copy of my visit note below.    Oncology initial visit:  Date on this visit: 5/17/2018    Naseem Ferrer  is referred by Dr.Julie Nila Morris* for an oncology consultation. He requires evaluation for new diagnosis of     Primary Physician: Jonna Clements     History Of Present Illness:  Mr. Ferrer is a 63 year old male with a history of COPD and frequent pneumonias was admitted to the hospital in late January. Severe abdominal pain and at that time he was found to have a large liver lesion with hemorrhage inside the liver mass. He underwent bland embolization on 1/29/2018. Later on he met with Dr. Connelly on 2/15/2018 for consideration of resection but he was not deemed a surgical candidate.   His abdominal pain significantly improved after the procedure. Prior to that he was developing jaundice but after that his cholesterol also improved. He then had a biopsy of the liver lesion on 4/3/2018 which did not show malignancy. He was also found to have hepatitis C which has never been treated  A follow-up MRI on 4/25/2018 showed the large mass involving the majority of the right lobe of liver with several satellite nodules. This was concerning for malignancy. He was also found to have a tumor thrombus in the right hepatic vein and its branches. The liver was not cirrhotic. There was increase in peritoneal/omental nodularity which was consistent with metastatic disease. Lung nodules were also seen at the bases consistent with metastatic disease  On 5/9/2018 he had biopsy of the omental nodule which was consistent with metastatic hepatocellular carcinoma. After the procedure he had intra-abdominal hemorrhage requiring embolization.  CT of the chest shows multiple pulmonary nodules consistent with metastatic  disease.  He comes in today accompanied by his wife and a friend and tells me that he has some abdominal pain which is manageable and he does not require any pain medications. He occasionally has nausea and overall his been able to eat and drink well. Bowels are working well. During his recent hospitalization he was also treated for community-acquired pneumonia with Augmentin which she continues to take. His breathing is much better now anything is that after he had the first embolization in January, overall his breathing has improved. Although he was on oxygen 6 L by nasal cannula currently he is not using supplemental oxygen and his oxygen saturation has been in low 90s.  He denies any new swellings but he tells me that his abdomen feels bloated for the last few months. Denies any leg edema. He denies any melena or hematochezia. He denies any other bleeding.  He is slowly gaining back his energy and is able to do his usual rest of although any strenuous work and will make him fatigued.  Climbing up 1 flight of stairs would make him short of breath  He denies any neuropathy.  He has been a chronic smoker for 50 years and often on used to drink heavily but not on a regular basis but after his episode of abdominal pain in January 2018 he quit smoking and alcohol.    ECOG 1    ROS:  A comprehensive ROS was otherwise neg        Past Medical/Surgical History:  Past Medical History:   Diagnosis Date     COPD (chronic obstructive pulmonary disease) (H)     very severe. O2 dependent, h/o intubation. frequent exacerbations.     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 This has not been documented in his prior notes and I do not see a workup regarding that.    Cancer History:   As above    Allergies:  Allergies as of 05/17/2018 - Armin as Reviewed 05/17/2018   Allergen Reaction Noted     Mold Unknown 02/26/2016     Tramadol Unknown, Other (See Comments), Diarrhea, Rash, and Nausea and Vomiting 08/30/2012     Current Medications:  Current Outpatient Prescriptions   Medication Sig Dispense Refill     albuterol (PROAIR HFA/PROVENTIL HFA/VENTOLIN HFA) 108 (90 BASE) MCG/ACT Inhaler Inhale 2 puffs into the lungs every 4 hours as needed for shortness of breath / dyspnea or wheezing       amoxicillin-clavulanate (AUGMENTIN) 875-125 MG per tablet Take 1 tablet by mouth every 12 hours for 8 days 16 tablet 0     fluticasone (FLONASE) 50 MCG/ACT spray Spray 2 sprays into both nostrils daily       fluticasone-salmeterol (ADVAIR) 250-50 MCG/DOSE diskus inhaler Inhale 1 puff into the lungs 2 times daily 1 Inhaler 0     ipratropium - albuterol 0.5 mg/2.5 mg/3 mL (DUONEB) 0.5-2.5 (3) MG/3ML neb solution Take 1 vial (3 mLs) by nebulization 4 times daily 360 mL 0     loratadine (CLARITIN) 10 MG tablet Take 1 tablet (10 mg) by mouth daily 30 tablet 0     aspirin 81 MG tablet Take 81 mg by mouth daily       thiamine 100 MG tablet Take 1 tablet (100 mg) by mouth daily (Patient not taking: Reported on 5/17/2018) 30 tablet 0      Family History:  Family History   Problem Relation Age of Onset     LUNG DISEASE Mother      CANCER Father      unknown      CANCER Sister      lung    his brother has porphyria. Sister had hepatocellular carcinoma secondary to hepatitis C virus infection. He is not sure of the age. His father was involved in World War II and he tells me that a lot of people at that time developed cancer who went to [place] and his father also had some sort of her cancer but he is not sure of the details. He  "has 3 children who are healthy  Social History:  Social History     Social History     Marital status:      Spouse name: N/A     Number of children: N/A     Years of education: N/A     Occupational History     Not on file.     Social History Main Topics     Smoking status: Former Smoker     Packs/day: 1.00     Years: 50.00     Quit date: 1/29/2018     Smokeless tobacco: Never Used     Alcohol use No      Comment: Last drink Jan 27, 2018     Drug use: No     Sexual activity: Not on file     Other Topics Concern     Not on file     Social History Narrative    he has been a chronic smoker but he quit smoking in January. Off-and-on in the past he has been a heavy drinker especially on the weekends but he quit that in January 2018 as well. He works in childcare. He lives with his wife    Physical Exam:  /77 (BP Location: Right arm, Patient Position: Chair, Cuff Size: Adult Regular)  Pulse 96  Temp 98.5  F (36.9  C) (Oral)  Ht 1.626 m (5' 4.02\")  Wt 65.5 kg (144 lb 6.4 oz)  SpO2 91%  BMI 24.77 kg/m2  CONSTITUTIONAL: no acute distress  EYES: PERRLA, there is mild pallor as well as icterus   ENT/MOUTH: no mouth lesions. Ears normal  CVS: s1s2 no m r g .   RESPIRATORY: Decreased breath sounds bilaterally with occasional wheezes  GI: Abdomen is mildly firm and there is mild tenderness without any guarding rigidity or rebound. Hepatomegaly is noted.  NEURO: AAOX3  Grossly non focal neuro exam  INTEGUMENT: no obvious rashes  LYMPHATIC: no palpable cervical, supraclavicular, axillary LAD  MUSCULOSKELETAL: Unremarkable. No bony tenderness.   EXTREMITIES: no edema  PSYCH: Mentation, mood and affect are normal. Decision making capacity is intact      Laboratory/Imaging Studies      Results for orders placed or performed during the hospital encounter of 05/09/18   IR Visceral Angiogram    Narrative    Procedures 5/9/2018:    1. Left common femoral arteriotomy.  2. Selective catheterization and angiography of the " contralateral  right external iliac artery.  3. Selective catheterization and angiography of the contralateral  right inferior epigastric artery.  4. Empiric embolization of the contralateral right inferior epigastric  artery.  5. Selective catheterization and angiography of the contralateral  right circumflex iliac artery.    History: 63-year-old male with history of large right lobe liver mass  with spontaneous hemorrhage previously treated with bland  embolization. Patient returned to interventional radiology today for  repeat biopsy with postprocedural findings concerning for hemorrhage.  CTA performed which demonstrated active extravasation from what  appeared to be a branch vessel of the right inferior epigastric  artery. Patient taken to interventional radiology for urgent  embolization.    Comparison: CTA earlier same day    Staff: BERTA Ponce MD    Fellow: REJI Stearns MD    Medications:   1. 25 mcg Fentanyl  2. 0.5 mg Versed    Moderate sedation administered by the IR nurse at the supervision of  the attending. Vital signs and oxygenation continuously monitored. The  patient remained stable throughout the procedure.    Sedation time: 90 minutes of direct face-to-face evaluation    Fluoroscopy time: 17 minutes    Contrast: 50 cc Ultravist 320    Findings/procedure: Prior to the procedure, written and verbal  informed consent were obtained from the patient. The patient was  placed in the supine  position on the fluoroscopic examination table  and the right groin  was prepped and draped in the usual sterile  fashion. Limited preprocedural ultrasound demonstrated widely patent  right  common femoral artery  with suitable positioning for access. 1%  lidocaine without epinephrine used for local anesthesia. Under  ultrasound guidance, micropuncture needle was advanced into the right   common femoral artery  and key ultrasound image demonstrating needle  tip in the vessel lumen was saved and archived to PACS.  The  micropuncture needle was exchanged for a 5  [place]ian 10 cm sheath using  modified Seldinger technique.    5 Latvian VCF flush catheter was advanced to the lower abdominal aorta  and used to catheterize the right common iliac artery. Catheter passed  over the wire to the level of the acetabulum and wire was exchanged  for 0.035 Amplatz stiff wire. Sheath removed and exchanged for a 6  Latvian 45 cm Terumo destination sheath which was advanced to the  distal right external iliac artery. Angiogram performed from the  sheath demonstrates widely patent right common, external, and internal  iliac arteries as well as common femoral artery. This also  demonstrated patent proximal superficial and deep femoral arteries.  Also demonstrated were origins of both the deep circumflex iliac and  inferior epigastric arteries.    Angled glide catheter was advanced over the wire to the distal  external iliac artery and used to catheterize the right inferior  epigastric artery. Angiography performed demonstrating patent anatomy  without clear evidence of active extravasation. 2.8 Latvian Terumo  Progreat catheter and fathom guidewire were used to select and  catheterize the distal inferior epigastric artery. Angiography  performed in multiple projections demonstrated no clear evidence of  active extravasation, though this did supply the area of extravasation  seen on previous CTA. Given high suspicion for hemorrhage from this  location, empiric embolization performed with approximately 8 cc  Gelfoam slurry to stasis. Stasis of flow confirmed with repeat  contrast injection from the microcatheter.    Micro-catheter was removed and a 5 Latvian angled glide catheter was  then used to selectively catheterize the right deep circumflex iliac  artery. Angiography of this vessel performed demonstrating no evidence  of active extravasation, and provided vascular supply to an area of  the abdominal wall not involved with hemorrhage.    Catheters  removed. Destination sheath removed and exchanged over the  wire for 6 [place] 10 cm vascular sheath. Arteriotomy closure performed  with [employer]  arteriotomy closure device.      Impression    Impression:    1. Contralateral right external iliac angiogram demonstrates widely  patent common, internal, external iliac arteries and branches.  2. Selective catheterization and angiography of the contralateral  right inferior epigastric artery demonstrates no evidence of active  extravasation, however this does supply the area of abdominal wall or  bleed as seen on CTA.  3. Empiric Gelfoam embolization performed of the right inferior  epigastric artery to stasis.  4. Selective catheterization and angiography of the right deep  circumflex iliac artery demonstrates no evidence of active  extravasation and supplies a portion of the abdominal wall away from  previously seen hemorrhage.    Plan:   Patient to be admitted to unit 6B for close hemodynamic monitoring.  Bedrest with right leg straight for 3 hours. I'll continue to follow.  Please call with any questions or concerns.    I have personally reviewed the examination and initial interpretation  and I agree with the findings.    JESSIE LERNER MD   CT Abdomen Pelvis w Contrast   Result Value Ref Range    Radiologist flags Active extravasation (AA)     Narrative    EXAMINATION: CT ABDOMEN PELVIS W CONTRAST, 5/9/2018 5:03 PM    TECHNIQUE:  Helical CT images from the lung bases through the  symphysis pubis were obtained with IV contrast. Contrast dose: isovue  370    COMPARISON: CT calf 2/15/2018    HISTORY: evaluate for complication s/p biopsy-abd  pain;     FINDINGS:    Abdomen and pelvis: Redemonstration of inferior right hepatic lobe  heterogeneously enhancing mass which demonstrates arterial enhancement  and progressive centripetal filling on subsequent venous and portal  venous phase images. Numerous tortuous arterial vessels course  throughout this lesion. Overall  the lesion is not significantly  changed in size or appearance when compared to MRI dated 4/25/2018.    There are new changes of omental nodule biopsy in the right upper  quadrant anterior to the liver capsule and the liver mass with  multiple small foci of gas in the abdominal wall fat (series 16 image  178). On comparison examinations there was a small amount of simple  free fluid about the liver and upper abdomen. On the current exam,  there is a moderate amount of complex fluid with focally hyperdense  fluid about the omental nodule biopsy site extending into the right  paracolic gutter, compatible with hemorrhage. On arterial and early  venous phase images there is a suspicious vessel arising from the  anterior abdominal wall of the right upper quadrant (series 10 image  353). On delayed portal venous phase images there is active  extravasation into a portion of the perihepatic bleed with layering  contrast material (series 13 image 385).    No dilated small or large bowel. No gross free air, pneumatosis, or  portal venous gas. Sigmoid diverticulosis without diverticulitis.  Normal prostate gland and urinary bladder. No hydronephrosis.  Bilateral simple renal cysts. Otherwise normal kidneys. Normal spleen,  adrenals, pancreas, and gallbladder. Unchanged small sliding-type  hiatal hernia. Grossly unchanged appearance to multiple omental  nodules suspicious for metastatic disease.    Lung bases: Multiple new right lower lobe pulmonary nodules measuring  9 mm (series 13 image 38) in the medial right lower lobe, 8 mm (series  13 image 38) in the mid posterior right lower lobe, and 5 mm (series  13 image 36) immediately adjacent. Wall thickening and mucous plugging  in the bronchi of the right lower lobe suggestive these may be  infectious or inflammatory in etiology. Other small nodules seen  previously in the left lower lobe have resolved.    Bones and soft tissues: No acute or suspicious osseous lesion.  Soft  tissues of the abdominal walls are within normal limits.      Impression    IMPRESSION:   1. Postbiopsy changes for right upper quadrant anterior omental nodule  with evidence of active hemorrhage with increased volume of now  moderate complex free fluid about the liver and throughout the  abdomen.  2. Grossly unchanged appearance to large, heterogeneously enhancing  right hepatic lobe mass and omental nodularity suspicious for  malignancy with metastatic disease.  3. New right lower lobe nodules measuring up to 8 mm in diameter.  Associated bronchial wall thickening and mucous plugging in the right  lower lobe suggests that these may be infectious or inflammatory  etiology although they remain highly suspicious for metastatic disease  and short interval follow-up is recommended to evaluate for  stability/resolution.  4. Tree-in-bud opacities and nodules in the left lower lobe have  resolved, compatible with resolution of prior infectious or  inflammatory process.    [Critical Result: Active extravasation]    Finding was identified on 5/9/2018 5:05 PM.     Dr. Stearns was contacted by Dr. Johnson at 5/9/2018 5:09 PM and  verbalized understanding of the critical finding.     I have personally reviewed the examination and initial interpretation  and I agree with the findings.    NABILA ESCAMILLA MD   CT Abdomen/Pelvis Angio wo & w Contrast   Result Value Ref Range    Radiologist flags (Urgent)      Possible small pseudoaneurysm, which is a potential    Narrative    Exam: Computed tomographic angiography of the abdomen without and with  contrast, including 3D reformations dated 5/10/2018 3:35 PM    Clinical information:  concern for intraabdominal bleed s/p procedure;      Technique: Helical scans through the abdomen and obtained before the  administration of intravenous contrast media and following the  injection of contrast media an arterial and delayed phases. Source  images reviewed as well as 3D and  multi-planar reconstructions.    Contrast: 100cc Isovue 370    DLP: 3153 mGy*cm    Comparison study: CT angiogram from the prior day, CT 2/15/2018, MRI  4/25/2018.    Findings:   Vascular:  No definite focus of extraluminal contrast extravasation is  identified. There is a cluster of prominent vessels in the region of  the prior biopsy, with a small 3 mm focus of contrast enhancement near  the prominent omental nodule (series 5 image 331) which does not  demonstrate clear blooming on the delayed phase images. The vessel  adjacent to the small contrast blush appears trace back to the  gastroduodenal artery. No other possible focus of acute contrast  extravasation is identified. There are mild atherosclerotic  calcifications in the aorta, without focal aneurysm or stenosis. The  celiac artery is patent. The hepatic arterial configuration is  conventional. The SMA is widely patent at its origin and into the  distal visualized branches. A single right and a single left renal  artery appear patent. Visualized iliac vessels demonstrate  atherosclerotic calcification without significant stenosis.    Abdomen/pelvis:   Since the CT comparison on the prior day there is been mild interval  increase in the amount of mildly heterogeneous hyperdense fluid within  the abdomen, representing an increase in hemoperitoneum. No  significant change in the appearance of numerous arterially enhancing  lesions throughout the right lobe with the liver which progressive  enhancement through the delayed phases. The spleen, pancreas, adrenal  glands remain within normal limits. No significant change in the  scattered of peritoneal nodularity including a previously biopsied  right mid abdominal peritoneal nodule as well as numerous other small  omental and mesenteric nodules. Diverticulosis. No large or small  bowel dilation. Biliary excretion of contrast with contrast within the  gallbladder lumen. Nonenhancing cystic lesion in the posterior  left  kidney compatible with simple renal cyst. Other subcentimeter lesions  too small to characterize, most likely simple renal cyst. Enlarged and  prominent mesenteric lymph nodes are present throughout. No  retroperitoneal or inguinal lymphadenopathy.    Lung bases:  Numerous solid appearing right basilar pulmonary nodules are again  identified, which appears similar to the most recent CT comparison,  but are new from the chest CT on 2/15/2018.    Bones and soft tissues:  Stable vertically trabeculated lesion in the T11 vertebral body is  compatible with a benign vertebral body hemangioma. There are  degenerative changes in the lumbar spine. Anterior wedge compression  deformity of T12 with Schmorl's node deformity also not significantly  changed.      Impression    Impression:  1. Small focus of contrast enhancement adjacent to the biopsied nodule  which does not demonstrate blooming on the delayed phase. This could  represent a small pseudoaneurysm or a small spastic vessel. Although  no definite contrast extravasation is identified, this is the most  suspicious finding that could be related to persistent hemorrhage.  Vessels feeding this focus likely arise from branch vessels off the  gastroduodenal artery.  2. Since the prior day, there has been a small interval increase in  hemoperitoneum. Again this is not definitive, as it could represent  hemorrhage that occurred between the time of the CT on the  embolization from the prior day, or could represent more recent or  ongoing hemorrhage.  3. No significant change in the large heterogeneous arterially  enhancing lesions within the liver, with findings worrisome for  metastatic disease including peritoneal and omental nodularity as well  as solid right basilar pulmonary nodules. Statistically, this is most  likely represent hepatocellular carcinoma, although not entirely  characteristic.      [Urgent Result: Possible small pseudoaneurysm, which is a  potential  source of persistent intra-abdominal hemorrhage.]    Finding was identified on 5/10/2018 3:42 PM.     KATHRIN Moreno was contacted by Dr. Bean at 5/10/2018 4:41 PM and  verbalized understanding of the urgent finding.     I have personally reviewed the examination and initial interpretation  and I agree with the findings.    BAKARI COFFEY MD   IR Visceral Angiogram    Narrative    Procedures 5/11/2018:    1. Ultrasound guided right common femoral arteriotomy.  2. Selective catheterization and angiography of the common hepatic  artery.  3. Selective catheterization and angiography right hepatic artery.  4. Selective catheterization and angiography of the gastroduodenal  artery.    History: 63-year-old male with newly diagnosed metastatic  hepatocellular carcinoma and postbiopsy bleeding A2 following appears  embolization of the right inferior epigastric artery. Serial  hemoglobin checks demonstrate continued hemoglobin drops. Patient  remains stable, however given continued drop in hemoglobin, decision  was made to perform repeat angiography to evaluate for other sources  of bleeding.    Comparison: CT angiogram previous day.    Staff: REJI Li MD    Fellow: REJI Stearns MD    Medications:   1. 250 mcg Fentanyl  2. 5 mg Versed    Moderate sedation administered by the IR nurse at the supervision of  the attending. Vital signs and oxygenation continuously monitored. The  patient remained stable throughout the procedure.    Sedation time: 100 minutes of direct face-to-face evaluation    Fluoroscopy time: 25 minutes    Contrast: 100 cc Visipaque 320    Findings/procedure: Prior to the procedure, written and verbal  informed consent were obtained from the patient. The patient was  placed in the supine  position on the fluoroscopic examination table  and the right groin  was prepped and draped in the usual sterile  fashion. Limited preprocedural ultrasound demonstrated widely patent  right  common femoral  artery  with suitable positioning for access. 1%  lidocaine without epinephrine used for local anesthesia. Under  ultrasound guidance, micropuncture needle was advanced into the right   common femoral artery  and key ultrasound image demonstrating needle  tip in the vessel lumen was saved and archived to PACS. The  micropuncture needle was exchanged for a 5  [place] 10 cm vascular  sheath using modified Seldinger technique.    0.035 [place]on guidewire and C2 catheter advanced to the abdominal  aorta and used to selectively catheterize the celiac artery. Catheter  and 0.035 angle Glidewire then advanced the right hepatic artery.    Hepatic angiogram performed demonstrating enhancement of the large  right lobe tumor mass without evidence of active extravasation.  Catheter was retracted and limited angiogram from the celiac artery  performed demonstrating patent vasculature with a proximal small  vessel which appeared to progress to the right upper quadrant. This is  selectively catheterized with the C2 catheter and angiogram performed  demonstrated likely duplicated after duodenal artery supplies the  right upper quadrant tumor/hepatic flexure of the colon.    This was in the area previously seen bleed artery was selectively  catheterized with a direction microcatheter and wire angiography  performed from this location demonstrates no clear evidence of tumor  supply.    Microcatheter removed and base catheter placed in the common hepatic  artery. Power injection performed from this location demonstrated  patent vasculature with large right lobe tumor mass again noted with 2  canal and branch vessels from the common hepatic artery, presumably  gastroduodenal artery and gastroepiploic artery. 2 branch vessels are  noted to progress to the right in the area of previously seen  hemorrhage. The previously catheterize branch of the gastroduodenal  artery was again catheterized and selective injection performed  which  demonstrated no clear evidence of active extravasation.    At this point the procedure, the patient had become progressively more  agitated and required with upper and lower extremity restraints as  well as physical restraint by support staff. Despite restraints and  frequent renal orientation by nursing, the patient managed to dislodge  the right common femoral sheath which was able to replaced over the  wire. Given inability to tolerate the procedure, decision was made to  end the procedure.    Catheter removed. Wire maintained for access in case she became  dislodged again. Arteriotomy closure performed with Starclose device.  No immediate complication.      Impression    Impression:    1. Selective catheterization and angiography of the common hepatic  artery demonstrates known large right lobe tumor mass with 2 downgoing  branch vessels from the common hepatic artery, presumably a  gastroepiploic and gastroduodenal arteries.   2. Selective catheterization and angiography of the gastroduodenal  artery and subselective branches directed towards the right upper  quadrant demonstrated no clear evidence of active extravasation.  3. Selective catheterization and angiography of the right hepatic  artery demonstrates supply to large right lobe hepatic tumor.    Plan:   Patient to floor for continued cares. Continue close hemodynamic  monitoring. Patient decompensates and hemodynamic standpoint, repeat  angiography could be performed though would require general anesthesia  given intolerance to moderate sedation.   CT Chest w/o Contrast    Narrative    EXAMINATION: Chest CT 5/12/2018 2:53 PM.    CLINICAL HISTORY: leukocytosis, new diagnosis HCC w/ mets- eval for  PNA, pulmonary mets;     COMPARISON: Outside chest CT 1/17/2018    TECHNIQUE: Acquisition of CT images through the chest without  contrast. Coronal and axial MIP reformatted images obtained.    FINDINGS:    The thyroid is unremarkable. No axillary  lymphadenopathy. A few small  mediastinal nodes. The central tracheobronchial tree is patent. The  heart is not enlarged. No pericardial effusion. No pleural effusion.    Multiple pulmonary nodules (greater than 20), bilateral but  predominantly in the right lower lobe. The largest lesion measures 1.4  cm. Small area of groundglass opacity along the peripheral aspect of  the right middle lobe. There are tree in bud opacities in the right  upper and right lower lobes.     Limited evaluation of the upper abdomen. Large right hepatic lobe mass  and peritoneal implants consistent with known HCC is better evaluated  on CT abdomen 5/9/2018.    No worrisome osseous abnormality.      Impression    IMPRESSION:   1. Innumerable pulmonary nodules measuring up to 1.4 cm, consistent  with metastatic disease.  2. Small area of groundglass opacity along the peripheral aspect of  the right middle lobe and tree in bud opacities; differential includes  metastatic disease versus infection.  3. Liver mass and peritoneal carcinomatosis    I have personally reviewed the examination and initial interpretation  and I agree with the findings.    MARK ANTHONY ROSS MD   INR   Result Value Ref Range    INR 0.98 0.86 - 1.14   CBC with platelets differential   Result Value Ref Range    WBC 9.8 4.0 - 11.0 10e9/L    RBC Count 5.14 4.4 - 5.9 10e12/L    Hemoglobin 15.9 13.3 - 17.7 g/dL    Hematocrit 49.0 40.0 - 53.0 %    MCV 95 78 - 100 fl    MCH 30.9 26.5 - 33.0 pg    MCHC 32.4 31.5 - 36.5 g/dL    RDW 15.7 (H) 10.0 - 15.0 %    Platelet Count 240 150 - 450 10e9/L    Diff Method Automated Method     % Neutrophils 63.9 %    % Lymphocytes 23.3 %    % Monocytes 8.9 %    % Eosinophils 2.7 %    % Basophils 0.7 %    % Immature Granulocytes 0.5 %    Nucleated RBCs 0 0 /100    Absolute Neutrophil 6.3 1.6 - 8.3 10e9/L    Absolute Lymphocytes 2.3 0.8 - 5.3 10e9/L    Absolute Monocytes 0.9 0.0 - 1.3 10e9/L    Absolute Eosinophils 0.3 0.0 - 0.7 10e9/L     Absolute Basophils 0.1 0.0 - 0.2 10e9/L    Abs Immature Granulocytes 0.1 0 - 0.4 10e9/L    Absolute Nucleated RBC 0.0    Leukemia Lymphoma Evaluation (Flow Cytometry)   Result Value Ref Range    Copath Report       Patient Name: CARA FARFAN  MR#: [id number]  Specimen #: [id number]  Collected: 5/9/2018 13:00  Received: 5/9/2018 15:15  Reported: 5/10/2018 11:25  Ordering Phy(s): KIARA ZARATE    For improved result formatting, select 'View Enhanced Report Format' under   Linked Documents section.  _________________________________________    SPECIMEN(S):  Omentum biopsy    INTERPRETATION:  Omentum biopsy  Canceled-Test Credited. The physician, Dr. Avendano, notified/approved on   05/10/18 at 11:20.    I have personally reviewed all specimens and/or slides, including the   listed special stains, and used them  with my medical judgment to determine the final diagnosis.    Electronically signed out by:    [facility] Technical Staff    This test was developed and its performance characteristics determined by   [facility]. It has not been cleared or   approved by the US Food and Drug  Administration.  FDA does not require this test to go throug h premarket   FDA review. This test is used for  clinical purposes and should not be regarded as investigational or for   research.  This laboratory is certified  under the Clinical Laboratory Improvement Amendments (CLIA) as qualified   to perform high complexity clinical  laboratory testing.    CPT Codes:    TESTING LAB LOCATION:  [place] [id number] [facility] [address] [place] [id number]  [phone]    COLLECTION SITE:  Client:  [facility]  Location:  [facility] (B)     Hemoglobin   Result Value Ref Range    Hemoglobin 13.1 (L) 13.3 - 17.7 g/dL   Comprehensive metabolic panel   Result Value Ref Range    Sodium 137 133 - 144 mmol/L     Potassium 4.3 3.4 - 5.3 mmol/L    Chloride 104 94 - 109 mmol/L    Carbon Dioxide 25 20 - 32 mmol/L    Anion Gap 8 3 - 14 mmol/L    Glucose 227 (H) 70 - 99 mg/dL    Urea Nitrogen 16 7 - 30 mg/dL    Creatinine 0.85 0.66 - 1.25 mg/dL    GFR Estimate >90 >60 mL/min/1.7m2    GFR Estimate If Black >90 >60 mL/min/1.7m2    Calcium 8.4 (L) 8.5 - 10.1 mg/dL    Bilirubin Total 0.6 0.2 - 1.3 mg/dL    Albumin 3.1 (L) 3.4 - 5.0 g/dL    Protein Total 7.7 6.8 - 8.8 g/dL    Alkaline Phosphatase 126 40 - 150 U/L    ALT 69 0 - 70 U/L     (H) 0 - 45 U/L   Magnesium   Result Value Ref Range    Magnesium 2.2 1.6 - 2.3 mg/dL   Hemoglobin   Result Value Ref Range    Hemoglobin 11.7 (L) 13.3 - 17.7 g/dL   CBC with platelets   Result Value Ref Range    WBC 20.9 (H) 4.0 - 11.0 10e9/L    RBC Count 3.55 (L) 4.4 - 5.9 10e12/L    Hemoglobin 10.7 (L) 13.3 - 17.7 g/dL    Hematocrit 33.5 (L) 40.0 - 53.0 %    MCV 94 78 - 100 fl    MCH 30.1 26.5 - 33.0 pg    MCHC 31.9 31.5 - 36.5 g/dL    RDW 15.9 (H) 10.0 - 15.0 %    Platelet Count 282 150 - 450 10e9/L   Comprehensive metabolic panel   Result Value Ref Range    Sodium 138 133 - 144 mmol/L    Potassium 4.7 3.4 - 5.3 mmol/L    Chloride 108 94 - 109 mmol/L    Carbon Dioxide 20 20 - 32 mmol/L    Anion Gap 10 3 - 14 mmol/L    Glucose 115 (H) 70 - 99 mg/dL    Urea Nitrogen 23 7 - 30 mg/dL    Creatinine 0.77 0.66 - 1.25 mg/dL    GFR Estimate >90 >60 mL/min/1.7m2    GFR Estimate If Black >90 >60 mL/min/1.7m2    Calcium 8.2 (L) 8.5 - 10.1 mg/dL    Bilirubin Total 0.6 0.2 - 1.3 mg/dL    Albumin 2.8 (L) 3.4 - 5.0 g/dL    Protein Total 7.0 6.8 - 8.8 g/dL    Alkaline Phosphatase 100 40 - 150 U/L    ALT 72 (H) 0 - 70 U/L     (H) 0 - 45 U/L   Lactic acid level STAT for sepsis protocol   Result Value Ref Range    Lactate for Sepsis Protocol 1.1 0.4 - 1.9 mmol/L   Hemoglobin   Result Value Ref Range    Hemoglobin 9.9 (L) 13.3 - 17.7 g/dL   Hemoglobin   Result Value Ref Range    Hemoglobin 9.0 (L) 13.3 -  17.7 g/dL   Hemoglobin   Result Value Ref Range    Hemoglobin 9.3 (L) 13.3 - 17.7 g/dL   Hemoglobin   Result Value Ref Range    Hemoglobin 8.1 (L) 13.3 - 17.7 g/dL   Hemoglobin   Result Value Ref Range    Hemoglobin 8.3 (L) 13.3 - 17.7 g/dL   Hemoglobin   Result Value Ref Range    Hemoglobin 8.3 (L) 13.3 - 17.7 g/dL   Hemoglobin   Result Value Ref Range    Hemoglobin 8.7 (L) 13.3 - 17.7 g/dL   Hemoglobin   Result Value Ref Range    Hemoglobin 8.0 (L) 13.3 - 17.7 g/dL   Hemoglobin   Result Value Ref Range    Hemoglobin 8.3 (L) 13.3 - 17.7 g/dL   Hemoglobin   Result Value Ref Range    Hemoglobin 8.2 (L) 13.3 - 17.7 g/dL   Lactic acid level STAT for sepsis protocol   Result Value Ref Range    Lactate for Sepsis Protocol 0.4 0.4 - 1.9 mmol/L   CBC with platelets   Result Value Ref Range    WBC 12.9 (H) 4.0 - 11.0 10e9/L    RBC Count 2.87 (L) 4.4 - 5.9 10e12/L    Hemoglobin 8.5 (L) 13.3 - 17.7 g/dL    Hematocrit 27.9 (L) 40.0 - 53.0 %    MCV 97 78 - 100 fl    MCH 29.6 26.5 - 33.0 pg    MCHC 30.5 (L) 31.5 - 36.5 g/dL    RDW 17.0 (H) 10.0 - 15.0 %    Platelet Count 199 150 - 450 10e9/L   Comprehensive metabolic panel   Result Value Ref Range    Sodium 138 133 - 144 mmol/L    Potassium 4.1 3.4 - 5.3 mmol/L    Chloride 103 94 - 109 mmol/L    Carbon Dioxide 29 20 - 32 mmol/L    Anion Gap 6 3 - 14 mmol/L    Glucose 87 70 - 99 mg/dL    Urea Nitrogen 10 7 - 30 mg/dL    Creatinine 0.53 (L) 0.66 - 1.25 mg/dL    GFR Estimate >90 >60 mL/min/1.7m2    GFR Estimate If Black >90 >60 mL/min/1.7m2    Calcium 8.1 (L) 8.5 - 10.1 mg/dL    Bilirubin Total 1.2 0.2 - 1.3 mg/dL    Albumin 2.7 (L) 3.4 - 5.0 g/dL    Protein Total 7.2 6.8 - 8.8 g/dL    Alkaline Phosphatase 115 40 - 150 U/L    ALT 92 (H) 0 - 70 U/L     (HH) 0 - 45 U/L   Hemoglobin   Result Value Ref Range    Hemoglobin 8.4 (L) 13.3 - 17.7 g/dL   Hemoglobin   Result Value Ref Range    Hemoglobin 8.4 (L) 13.3 - 17.7 g/dL   AFP tumor marker   Result Value Ref Range    Alpha  Fetoprotein 9.1 (H) 0 - 8 ug/L   Hemoglobin   Result Value Ref Range    Hemoglobin 8.6 (L) 13.3 - 17.7 g/dL   Hemoglobin   Result Value Ref Range    Hemoglobin 8.2 (L) 13.3 - 17.7 g/dL   Lactic acid level STAT for sepsis protocol   Result Value Ref Range    Lactate for Sepsis Protocol 0.7 0.4 - 1.9 mmol/L   Hemoglobin   Result Value Ref Range    Hemoglobin 7.5 (L) 13.3 - 17.7 g/dL   Comprehensive metabolic panel   Result Value Ref Range    Sodium 136 133 - 144 mmol/L    Potassium 3.6 3.4 - 5.3 mmol/L    Chloride 103 94 - 109 mmol/L    Carbon Dioxide 28 20 - 32 mmol/L    Anion Gap 6 3 - 14 mmol/L    Glucose 95 70 - 99 mg/dL    Urea Nitrogen 7 7 - 30 mg/dL    Creatinine 0.49 (L) 0.66 - 1.25 mg/dL    GFR Estimate >90 >60 mL/min/1.7m2    GFR Estimate If Black >90 >60 mL/min/1.7m2    Calcium 7.7 (L) 8.5 - 10.1 mg/dL    Bilirubin Total 1.4 (H) 0.2 - 1.3 mg/dL    Albumin 2.4 (L) 3.4 - 5.0 g/dL    Protein Total 6.5 (L) 6.8 - 8.8 g/dL    Alkaline Phosphatase 96 40 - 150 U/L    ALT 63 0 - 70 U/L     (H) 0 - 45 U/L   INR   Result Value Ref Range    INR 1.15 (H) 0.86 - 1.14   Hemoglobin   Result Value Ref Range    Hemoglobin 8.0 (L) 13.3 - 17.7 g/dL   CRP inflammation   Result Value Ref Range    CRP Inflammation 65.0 (H) 0.0 - 8.0 mg/L   Nt probnp inpatient   Result Value Ref Range    N-Terminal Pro BNP Inpatient 1328 (H) 0 - 900 pg/mL   Procalcitonin   Result Value Ref Range    Procalcitonin 0.50 ng/ml   CBC with platelets   Result Value Ref Range    WBC 10.3 4.0 - 11.0 10e9/L    RBC Count 2.68 (L) 4.4 - 5.9 10e12/L    Hemoglobin 8.0 (L) 13.3 - 17.7 g/dL    Hematocrit 25.6 (L) 40.0 - 53.0 %    MCV 96 78 - 100 fl    MCH 29.9 26.5 - 33.0 pg    MCHC 31.3 (L) 31.5 - 36.5 g/dL    RDW 15.8 (H) 10.0 - 15.0 %    Platelet Count 201 150 - 450 10e9/L   Hemoglobin   Result Value Ref Range    Hemoglobin 8.4 (L) 13.3 - 17.7 g/dL   Hemoglobin   Result Value Ref Range    Hemoglobin 8.3 (L) 13.3 - 17.7 g/dL   CBC with platelets   Result  "Value Ref Range    WBC 9.4 4.0 - 11.0 10e9/L    RBC Count 2.76 (L) 4.4 - 5.9 10e12/L    Hemoglobin 8.2 (L) 13.3 - 17.7 g/dL    Hematocrit 25.8 (L) 40.0 - 53.0 %    MCV 94 78 - 100 fl    MCH 29.7 26.5 - 33.0 pg    MCHC 31.8 31.5 - 36.5 g/dL    RDW 15.8 (H) 10.0 - 15.0 %    Platelet Count 252 150 - 450 10e9/L     *Note: Due to a large number of results and/or encounters for the requested time period, some results have not been displayed. A complete set of results can be found in Results Review.     FINAL DIAGNOSIS:   OMENTUM, BIOPSY:   - Metastatic hepatocellular carcinoma     I have personally reviewed all specimens  and/or slides, including the   listed special stains, and used them   with my medical judgement to determine or confirm the final diagnosis.     Electronically signed out by:   Alonzo Avendano M.D., [facility]     CLINICAL HISTORY:   63 year old male with liver mass and omental nodule.   GROSS:   The specimen is received in formalin with proper patient identification,   labeled \"omentum\".  The specimen   consists of multiple pink-tan needle cores ranging in size from 0.2 up to   1.2 cm in length and up to 0.1 cm in   diameter.  The specimen is wrapped and entirely submitted in cassette A1.   (Dictated by: Riana Massey   5/9/2018 02:07 PM)     MICROSCOPIC:   Microscopic examination was performed. Tumor is positive for Arginase-1,   HEP-1, CK AE1/AE3 and CD10 (sinusoid   pattern) while negative for PAX-8 supporting the diagnosis of metastatic   hepatocellular carcinoma.     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    We discussed the situation in detail. His disease is not curable. Any treatment that would be offered would be palliative with the hope of slowing down the growth and " possibly shrinking the cancer maintaining his quality of life and possibly increasing survival. We discussed that the standard of treatment would be to try sorafenib. We discussed the rationale schedule and potential side effects of sorafenib in detail.  He tells me that he is not interested in trying it and he would rather focus on the efforts at trying to make him as comfortable as possible.    We also discussed the other option of comfort care with hospice and we spent quite a long time today discussing that. He is willing to talk to hospice and I gave him a refill for that.    Abdominal discomfort. This is due to the large liver lesion as well as omental/peritoneal spread of the cancer. Part of the discomfort could also be due to the recent hemorrhage after the biopsy. At this time it is mild and manageable without any pain medications. He will likely need more pain medications as the disease progresses and his pain would be managed by hospice in the future    He carries a diagnosis of porphyria but I do not know the details and he also is not aware of the details. He tells me that he never had any issues related to it. Since he is opting for hospice at this time I do not believe that doing a thorough workup for that is warranted    I answered all of his and his wife's questions to their satisfaction and they are agreeable and comfortable with the plan    Jesse Choe

## 2018-05-17 NOTE — MR AVS SNAPSHOT
After Visit Summary   5/17/2018    Naseem Ferrer    MRN: [id number]           Patient Information     Date Of Birth          1955        Visit Information        Provider Department      5/17/2018 5:00 PM Jesse Choe MD [facility]        Today's Diagnoses     Metastasis from malignant tumor of liver (H)    -  1    Liver mass        HCC (hepatocellular carcinoma) (H)          Care Instructions    Refer to hospice          Follow-ups after your visit        Your next 10 appointments already scheduled     Jun 11, 2018 10:30 AM CDT   Lab with  LAB   [facility] ([facility])    [address]  1st Floor  [facility] [id number] [phone]            Jun 11, 2018 11:30 AM CDT   (Arrive by 11:15 AM)   Return General Liver with Dacia Juan MD   [facility] ([facility])    [address] [place] [address]  [address]  [facility] [id number] [phone]              Who to contact     If you have questions or need follow up information about today's clinic visit or your schedule please contact [facility] directly at [phone].  Normal or non-critical lab and imaging results will be communicated to you by MyChart, letter or phone within 4 business days after the clinic has received the results. If you do not hear from us within 7 days, please contact the clinic through MyChart or phone. If you have a critical or abnormal lab result, we will notify you by phone as soon as possible.  Submit refill requests through [employer] or call your pharmacy and they will forward the refill request to us. Please allow 3 business days for your refill to be completed.          Additional Information About Your Visit        Care EveryWhere ID     This is your Care EveryWhere ID. This could be used by other organizations to access your [place] medical records  [id number]        Your Vitals Were   "   Pulse Temperature Height Pulse Oximetry BMI (Body Mass Index)       96 98.5  F (36.9  C) (Oral) 1.626 m (5' 4.02\") 91% 24.77 kg/m2        Blood Pressure from Last 3 Encounters:   05/17/18 144/77   05/14/18 123/83   05/09/18 (!) 131/94    Weight from Last 3 Encounters:   05/17/18 65.5 kg (144 lb 6.4 oz)   05/14/18 65 kg (143 lb 3.2 oz)   04/03/18 60.8 kg (134 lb 0.6 oz)              Today, you had the following     No orders found for display       Primary Care Provider Office Phone # Fax #    Jonna Clements [phone]       [facility] [address] [address]        Equal Access to Services     [facility]: Hadii norma velasquez Sosrinivas, waaxda luoc, qaybta kaalmada curtis, aure garcia . So [facility] [phone].    ATENCIÓN: Si habla español, tiene a bhagat disposición servicios gratuitos de asistencia lingüística. Estelle al [phone].    We comply with applicable federal civil rights laws and [place] laws. We do not discriminate on the basis of race, color, national origin, age, disability, sex, sexual orientation, or gender identity.            Thank you!     Thank you for choosing [facility]  for your care. Our goal is always to provide you with excellent care. Hearing back from our patients is one way we can continue to improve our services. Please take a few minutes to complete the written survey that you may receive in the mail after your visit with us. Thank you!             Your Updated Medication List - Protect others around you: Learn how to safely use, store and throw away your medicines at www.disposemymeds.org.          This list is accurate as of 5/17/18  6:15 PM.  Always use your most recent med list.                   Brand Name Dispense Instructions for use Diagnosis    albuterol 108 (90 Base) MCG/ACT Inhaler    PROAIR HFA/PROVENTIL HFA/VENTOLIN HFA     Inhale 2 puffs into the lungs every 4 hours as needed " for shortness of breath / dyspnea or wheezing        amoxicillin-clavulanate 875-125 MG per tablet    AUGMENTIN    16 tablet    Take 1 tablet by mouth every 12 hours for 8 days    Community acquired pneumonia, unspecified laterality       aspirin 81 MG tablet      Take 81 mg by mouth daily        fluticasone 50 MCG/ACT spray    FLONASE     Spray 2 sprays into both nostrils daily        fluticasone-salmeterol 250-50 MCG/DOSE diskus inhaler    ADVAIR    1 Inhaler    Inhale 1 puff into the lungs 2 times daily    Chronic obstructive pulmonary disease, unspecified COPD type (H)       ipratropium - albuterol 0.5 mg/2.5 mg/3 mL 0.5-2.5 (3) MG/3ML neb solution    DUONEB    360 mL    Take 1 vial (3 mLs) by nebulization 4 times daily    Chronic obstructive pulmonary disease, unspecified COPD type (H)       loratadine 10 MG tablet    CLARITIN    30 tablet    Take 1 tablet (10 mg) by mouth daily    Chronic obstructive pulmonary disease, unspecified COPD type (H), Acute seasonal allergic rhinitis, unspecified trigger       thiamine 100 MG tablet     30 tablet    Take 1 tablet (100 mg) by mouth daily    Alcohol abuse

## 2018-05-17 NOTE — PROGRESS NOTES
Oncology initial visit:  Date on this visit: 5/17/2018    Naseem Ferrer  is referred by Dr.Julie Nila Morris* for an oncology consultation. He requires evaluation for new diagnosis of     Primary Physician: Jonna Clements     History Of Present Illness:  Mr. Ferrer is a 63 year old male with a history of COPD and frequent pneumonias was admitted to the hospital in late January. Severe abdominal pain and at that time he was found to have a large liver lesion with hemorrhage inside the liver mass. He underwent bland embolization on 1/29/2018. Later on he met with Dr. Connelly on 2/15/2018 for consideration of resection but he was not deemed a surgical candidate.   His abdominal pain significantly improved after the procedure. Prior to that he was developing jaundice but after that his cholesterol also improved. He then had a biopsy of the liver lesion on 4/3/2018 which did not show malignancy. He was also found to have hepatitis C which has never been treated  A follow-up MRI on 4/25/2018 showed the large mass involving the majority of the right lobe of liver with several satellite nodules. This was concerning for malignancy. He was also found to have a tumor thrombus in the right hepatic vein and its branches. The liver was not cirrhotic. There was increase in peritoneal/omental nodularity which was consistent with metastatic disease. Lung nodules were also seen at the bases consistent with metastatic disease  On 5/9/2018 he had biopsy of the omental nodule which was consistent with metastatic hepatocellular carcinoma. After the procedure he had intra-abdominal hemorrhage requiring embolization.  CT of the chest shows multiple pulmonary nodules consistent with metastatic disease.  He comes in today accompanied by his wife and a friend and tells me that he has some abdominal pain which is manageable and he does not require any pain medications. He occasionally has nausea and overall his been able to eat and  drink well. Bowels are working well. During his recent hospitalization he was also treated for community-acquired pneumonia with Augmentin which she continues to take. His breathing is much better now anything is that after he had the first embolization in January, overall his breathing has improved. Although he was on oxygen 6 L by nasal cannula currently he is not using supplemental oxygen and his oxygen saturation has been in low 90s.  He denies any new swellings but he tells me that his abdomen feels bloated for the last few months. Denies any leg edema. He denies any melena or hematochezia. He denies any other bleeding.  He is slowly gaining back his energy and is able to do his usual rest of although any strenuous work and will make him fatigued.  Climbing up 1 flight of stairs would make him short of breath  He denies any neuropathy.  He has been a chronic smoker for 50 years and often on used to drink heavily but not on a regular basis but after his episode of abdominal pain in January 2018 he quit smoking and alcohol.    ECOG 1    ROS:  A comprehensive ROS was otherwise neg        Past Medical/Surgical History:  Past Medical History:   Diagnosis Date     COPD (chronic obstructive pulmonary disease) (H)     very severe. O2 dependent, h/o intubation. frequent exacerbations.     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 This has not been documented in his prior notes and I do not see a workup regarding that.    Cancer History:   As above    Allergies:  Allergies as  of 05/17/2018 - Armin as Reviewed 05/17/2018   Allergen Reaction Noted     Mold Unknown 02/26/2016     Tramadol Unknown, Other (See Comments), Diarrhea, Rash, and Nausea and Vomiting 08/30/2012     Current Medications:  Current Outpatient Prescriptions   Medication Sig Dispense Refill     albuterol (PROAIR HFA/PROVENTIL HFA/VENTOLIN HFA) 108 (90 BASE) MCG/ACT Inhaler Inhale 2 puffs into the lungs every 4 hours as needed for shortness of breath / dyspnea or wheezing       amoxicillin-clavulanate (AUGMENTIN) 875-125 MG per tablet Take 1 tablet by mouth every 12 hours for 8 days 16 tablet 0     fluticasone (FLONASE) 50 MCG/ACT spray Spray 2 sprays into both nostrils daily       fluticasone-salmeterol (ADVAIR) 250-50 MCG/DOSE diskus inhaler Inhale 1 puff into the lungs 2 times daily 1 Inhaler 0     ipratropium - albuterol 0.5 mg/2.5 mg/3 mL (DUONEB) 0.5-2.5 (3) MG/3ML neb solution Take 1 vial (3 mLs) by nebulization 4 times daily 360 mL 0     loratadine (CLARITIN) 10 MG tablet Take 1 tablet (10 mg) by mouth daily 30 tablet 0     aspirin 81 MG tablet Take 81 mg by mouth daily       thiamine 100 MG tablet Take 1 tablet (100 mg) by mouth daily (Patient not taking: Reported on 5/17/2018) 30 tablet 0      Family History:  Family History   Problem Relation Age of Onset     LUNG DISEASE Mother      CANCER Father      unknown      CANCER Sister      lung    his brother has porphyria. Sister had hepatocellular carcinoma secondary to hepatitis C virus infection. He is not sure of the age. His father was involved in World War II and he tells me that a lot of people at that time developed cancer who went to [place] and his father also had some sort of her cancer but he is not sure of the details. He has 3 children who are healthy  Social History:  Social History     Social History     Marital status:      Spouse name: N/A     Number of children: N/A     Years of education: N/A     Occupational History     Not on file.  "    Social History Main Topics     Smoking status: Former Smoker     Packs/day: 1.00     Years: 50.00     Quit date: 1/29/2018     Smokeless tobacco: Never Used     Alcohol use No      Comment: Last drink Jan 27, 2018     Drug use: No     Sexual activity: Not on file     Other Topics Concern     Not on file     Social History Narrative    he has been a chronic smoker but he quit smoking in January. Off-and-on in the past he has been a heavy drinker especially on the weekends but he quit that in January 2018 as well. He works in childcare. He lives with his wife    Physical Exam:  /77 (BP Location: Right arm, Patient Position: Chair, Cuff Size: Adult Regular)  Pulse 96  Temp 98.5  F (36.9  C) (Oral)  Ht 1.626 m (5' 4.02\")  Wt 65.5 kg (144 lb 6.4 oz)  SpO2 91%  BMI 24.77 kg/m2  CONSTITUTIONAL: no acute distress  EYES: PERRLA, there is mild pallor as well as icterus   ENT/MOUTH: no mouth lesions. Ears normal  CVS: s1s2 no m r g .   RESPIRATORY: Decreased breath sounds bilaterally with occasional wheezes  GI: Abdomen is mildly firm and there is mild tenderness without any guarding rigidity or rebound. Hepatomegaly is noted.  NEURO: AAOX3  Grossly non focal neuro exam  INTEGUMENT: no obvious rashes  LYMPHATIC: no palpable cervical, supraclavicular, axillary LAD  MUSCULOSKELETAL: Unremarkable. No bony tenderness.   EXTREMITIES: no edema  PSYCH: Mentation, mood and affect are normal. Decision making capacity is intact      Laboratory/Imaging Studies      Results for orders placed or performed during the hospital encounter of 05/09/18   IR Visceral Angiogram    Narrative    Procedures 5/9/2018:    1. Left common femoral arteriotomy.  2. Selective catheterization and angiography of the contralateral  right external iliac artery.  3. Selective catheterization and angiography of the contralateral  right inferior epigastric artery.  4. Empiric embolization of the contralateral right inferior epigastric  artery.  5. " Selective catheterization and angiography of the contralateral  right circumflex iliac artery.    History: 63-year-old male with history of large right lobe liver mass  with spontaneous hemorrhage previously treated with bland  embolization. Patient returned to interventional radiology today for  repeat biopsy with postprocedural findings concerning for hemorrhage.  CTA performed which demonstrated active extravasation from what  appeared to be a branch vessel of the right inferior epigastric  artery. Patient taken to interventional radiology for urgent  embolization.    Comparison: CTA earlier same day    Staff: BERTA Ponce MD    Fellow: REJI Stearns MD    Medications:   1. 25 mcg Fentanyl  2. 0.5 mg Versed    Moderate sedation administered by the IR nurse at the supervision of  the attending. Vital signs and oxygenation continuously monitored. The  patient remained stable throughout the procedure.    Sedation time: 90 minutes of direct face-to-face evaluation    Fluoroscopy time: 17 minutes    Contrast: 50 cc Ultravist 320    Findings/procedure: Prior to the procedure, written and verbal  informed consent were obtained from the patient. The patient was  placed in the supine  position on the fluoroscopic examination table  and the right groin  was prepped and draped in the usual sterile  fashion. Limited preprocedural ultrasound demonstrated widely patent  right  common femoral artery  with suitable positioning for access. 1%  lidocaine without epinephrine used for local anesthesia. Under  ultrasound guidance, micropuncture needle was advanced into the right   common femoral artery  and key ultrasound image demonstrating needle  tip in the vessel lumen was saved and archived to PACS. The  micropuncture needle was exchanged for a 5  Liberian 10 cm sheath using  modified Seldinger technique.    5 Liberian VCF flush catheter was advanced to the lower abdominal aorta  and used to catheterize the right common iliac artery.  Catheter passed  over the wire to the level of the acetabulum and wire was exchanged  for 0.035 Amplatz stiff wire. Sheath removed and exchanged for a 6  Liberian 45 cm Terumo destination sheath which was advanced to the  distal right external iliac artery. Angiogram performed from the  sheath demonstrates widely patent right common, external, and internal  iliac arteries as well as common femoral artery. This also  demonstrated patent proximal superficial and deep femoral arteries.  Also demonstrated were origins of both the deep circumflex iliac and  inferior epigastric arteries.    Angled glide catheter was advanced over the wire to the distal  external iliac artery and used to catheterize the right inferior  epigastric artery. Angiography performed demonstrating patent anatomy  without clear evidence of active extravasation. 2.8 Liberian Terumo  Progreat catheter and fathom guidewire were used to select and  catheterize the distal inferior epigastric artery. Angiography  performed in multiple projections demonstrated no clear evidence of  active extravasation, though this did supply the area of extravasation  seen on previous CTA. Given high suspicion for hemorrhage from this  location, empiric embolization performed with approximately 8 cc  Gelfoam slurry to stasis. Stasis of flow confirmed with repeat  contrast injection from the microcatheter.    Micro-catheter was removed and a 5 Liberian angled glide catheter was  then used to selectively catheterize the right deep circumflex iliac  artery. Angiography of this vessel performed demonstrating no evidence  of active extravasation, and provided vascular supply to an area of  the abdominal wall not involved with hemorrhage.    Catheters removed. Destination sheath removed and exchanged over the  wire for 6 Liberian 10 cm vascular sheath. Arteriotomy closure performed  with MyVubiquity  arteriotomy closure device.      Impression    Impression:    1. Contralateral right  external iliac angiogram demonstrates widely  patent common, internal, external iliac arteries and branches.  2. Selective catheterization and angiography of the contralateral  right inferior epigastric artery demonstrates no evidence of active  extravasation, however this does supply the area of abdominal wall or  bleed as seen on CTA.  3. Empiric Gelfoam embolization performed of the right inferior  epigastric artery to stasis.  4. Selective catheterization and angiography of the right deep  circumflex iliac artery demonstrates no evidence of active  extravasation and supplies a portion of the abdominal wall away from  previously seen hemorrhage.    Plan:   Patient to be admitted to unit 6B for close hemodynamic monitoring.  Bedrest with right leg straight for 3 hours. I'll continue to follow.  Please call with any questions or concerns.    I have personally reviewed the examination and initial interpretation  and I agree with the findings.    JESSIE LERNER MD   CT Abdomen Pelvis w Contrast   Result Value Ref Range    Radiologist flags Active extravasation (AA)     Narrative    EXAMINATION: CT ABDOMEN PELVIS W CONTRAST, 5/9/2018 5:03 PM    TECHNIQUE:  Helical CT images from the lung bases through the  symphysis pubis were obtained with IV contrast. Contrast dose: isovue  370    COMPARISON: CT calf 2/15/2018    HISTORY: evaluate for complication s/p biopsy-abd  pain;     FINDINGS:    Abdomen and pelvis: Redemonstration of inferior right hepatic lobe  heterogeneously enhancing mass which demonstrates arterial enhancement  and progressive centripetal filling on subsequent venous and portal  venous phase images. Numerous tortuous arterial vessels course  throughout this lesion. Overall the lesion is not significantly  changed in size or appearance when compared to MRI dated 4/25/2018.    There are new changes of omental nodule biopsy in the right upper  quadrant anterior to the liver capsule and the liver mass  with  multiple small foci of gas in the abdominal wall fat (series 16 image  178). On comparison examinations there was a small amount of simple  free fluid about the liver and upper abdomen. On the current exam,  there is a moderate amount of complex fluid with focally hyperdense  fluid about the omental nodule biopsy site extending into the right  paracolic gutter, compatible with hemorrhage. On arterial and early  venous phase images there is a suspicious vessel arising from the  anterior abdominal wall of the right upper quadrant (series 10 image  353). On delayed portal venous phase images there is active  extravasation into a portion of the perihepatic bleed with layering  contrast material (series 13 image 385).    No dilated small or large bowel. No gross free air, pneumatosis, or  portal venous gas. Sigmoid diverticulosis without diverticulitis.  Normal prostate gland and urinary bladder. No hydronephrosis.  Bilateral simple renal cysts. Otherwise normal kidneys. Normal spleen,  adrenals, pancreas, and gallbladder. Unchanged small sliding-type  hiatal hernia. Grossly unchanged appearance to multiple omental  nodules suspicious for metastatic disease.    Lung bases: Multiple new right lower lobe pulmonary nodules measuring  9 mm (series 13 image 38) in the medial right lower lobe, 8 mm (series  13 image 38) in the mid posterior right lower lobe, and 5 mm (series  13 image 36) immediately adjacent. Wall thickening and mucous plugging  in the bronchi of the right lower lobe suggestive these may be  infectious or inflammatory in etiology. Other small nodules seen  previously in the left lower lobe have resolved.    Bones and soft tissues: No acute or suspicious osseous lesion. Soft  tissues of the abdominal walls are within normal limits.      Impression    IMPRESSION:   1. Postbiopsy changes for right upper quadrant anterior omental nodule  with evidence of active hemorrhage with increased volume of  now  moderate complex free fluid about the liver and throughout the  abdomen.  2. Grossly unchanged appearance to large, heterogeneously enhancing  right hepatic lobe mass and omental nodularity suspicious for  malignancy with metastatic disease.  3. New right lower lobe nodules measuring up to 8 mm in diameter.  Associated bronchial wall thickening and mucous plugging in the right  lower lobe suggests that these may be infectious or inflammatory  etiology although they remain highly suspicious for metastatic disease  and short interval follow-up is recommended to evaluate for  stability/resolution.  4. Tree-in-bud opacities and nodules in the left lower lobe have  resolved, compatible with resolution of prior infectious or  inflammatory process.    [Critical Result: Active extravasation]    Finding was identified on 5/9/2018 5:05 PM.     Dr. Stearns was contacted by Dr. Johnson at 5/9/2018 5:09 PM and  verbalized understanding of the critical finding.     I have personally reviewed the examination and initial interpretation  and I agree with the findings.    NABILA ESCAMILLA MD   CT Abdomen/Pelvis Angio wo & w Contrast   Result Value Ref Range    Radiologist flags (Urgent)      Possible small pseudoaneurysm, which is a potential    Narrative    Exam: Computed tomographic angiography of the abdomen without and with  contrast, including 3D reformations dated 5/10/2018 3:35 PM    Clinical information:  concern for intraabdominal bleed s/p procedure;      Technique: Helical scans through the abdomen and obtained before the  administration of intravenous contrast media and following the  injection of contrast media an arterial and delayed phases. Source  images reviewed as well as 3D and multi-planar reconstructions.    Contrast: 100cc Isovue 370    DLP: 3153 mGy*cm    Comparison study: CT angiogram from the prior day, CT 2/15/2018, MRI  4/25/2018.    Findings:   Vascular:  No definite focus of extraluminal  contrast extravasation is  identified. There is a cluster of prominent vessels in the region of  the prior biopsy, with a small 3 mm focus of contrast enhancement near  the prominent omental nodule (series 5 image 331) which does not  demonstrate clear blooming on the delayed phase images. The vessel  adjacent to the small contrast blush appears trace back to the  gastroduodenal artery. No other possible focus of acute contrast  extravasation is identified. There are mild atherosclerotic  calcifications in the aorta, without focal aneurysm or stenosis. The  celiac artery is patent. The hepatic arterial configuration is  conventional. The SMA is widely patent at its origin and into the  distal visualized branches. A single right and a single left renal  artery appear patent. Visualized iliac vessels demonstrate  atherosclerotic calcification without significant stenosis.    Abdomen/pelvis:   Since the CT comparison on the prior day there is been mild interval  increase in the amount of mildly heterogeneous hyperdense fluid within  the abdomen, representing an increase in hemoperitoneum. No  significant change in the appearance of numerous arterially enhancing  lesions throughout the right lobe with the liver which progressive  enhancement through the delayed phases. The spleen, pancreas, adrenal  glands remain within normal limits. No significant change in the  scattered of peritoneal nodularity including a previously biopsied  right mid abdominal peritoneal nodule as well as numerous other small  omental and mesenteric nodules. Diverticulosis. No large or small  bowel dilation. Biliary excretion of contrast with contrast within the  gallbladder lumen. Nonenhancing cystic lesion in the posterior left  kidney compatible with simple renal cyst. Other subcentimeter lesions  too small to characterize, most likely simple renal cyst. Enlarged and  prominent mesenteric lymph nodes are present throughout. No  retroperitoneal  or inguinal lymphadenopathy.    Lung bases:  Numerous solid appearing right basilar pulmonary nodules are again  identified, which appears similar to the most recent CT comparison,  but are new from the chest CT on 2/15/2018.    Bones and soft tissues:  Stable vertically trabeculated lesion in the T11 vertebral body is  compatible with a benign vertebral body hemangioma. There are  degenerative changes in the lumbar spine. Anterior wedge compression  deformity of T12 with Schmorl's node deformity also not significantly  changed.      Impression    Impression:  1. Small focus of contrast enhancement adjacent to the biopsied nodule  which does not demonstrate blooming on the delayed phase. This could  represent a small pseudoaneurysm or a small spastic vessel. Although  no definite contrast extravasation is identified, this is the most  suspicious finding that could be related to persistent hemorrhage.  Vessels feeding this focus likely arise from branch vessels off the  gastroduodenal artery.  2. Since the prior day, there has been a small interval increase in  hemoperitoneum. Again this is not definitive, as it could represent  hemorrhage that occurred between the time of the CT on the  embolization from the prior day, or could represent more recent or  ongoing hemorrhage.  3. No significant change in the large heterogeneous arterially  enhancing lesions within the liver, with findings worrisome for  metastatic disease including peritoneal and omental nodularity as well  as solid right basilar pulmonary nodules. Statistically, this is most  likely represent hepatocellular carcinoma, although not entirely  characteristic.      [Urgent Result: Possible small pseudoaneurysm, which is a potential  source of persistent intra-abdominal hemorrhage.]    Finding was identified on 5/10/2018 3:42 PM.     KATHRIN Moreno was contacted by Dr. Bean at 5/10/2018 4:41 PM and  verbalized understanding of the urgent finding.     I have  personally reviewed the examination and initial interpretation  and I agree with the findings.    BAKARI COFFEY MD   IR Visceral Angiogram    Narrative    Procedures 5/11/2018:    1. Ultrasound guided right common femoral arteriotomy.  2. Selective catheterization and angiography of the common hepatic  artery.  3. Selective catheterization and angiography right hepatic artery.  4. Selective catheterization and angiography of the gastroduodenal  artery.    History: 63-year-old male with newly diagnosed metastatic  hepatocellular carcinoma and postbiopsy bleeding A2 following appears  embolization of the right inferior epigastric artery. Serial  hemoglobin checks demonstrate continued hemoglobin drops. Patient  remains stable, however given continued drop in hemoglobin, decision  was made to perform repeat angiography to evaluate for other sources  of bleeding.    Comparison: CT angiogram previous day.    Staff: REJI Li MD    Fellow: REJI Stearns MD    Medications:   1. 250 mcg Fentanyl  2. 5 mg Versed    Moderate sedation administered by the IR nurse at the supervision of  the attending. Vital signs and oxygenation continuously monitored. The  patient remained stable throughout the procedure.    Sedation time: 100 minutes of direct face-to-face evaluation    Fluoroscopy time: 25 minutes    Contrast: 100 cc Visipaque 320    Findings/procedure: Prior to the procedure, written and verbal  informed consent were obtained from the patient. The patient was  placed in the supine  position on the fluoroscopic examination table  and the right groin  was prepped and draped in the usual sterile  fashion. Limited preprocedural ultrasound demonstrated widely patent  right  common femoral artery  with suitable positioning for access. 1%  lidocaine without epinephrine used for local anesthesia. Under  ultrasound guidance, micropuncture needle was advanced into the right   common femoral artery  and key ultrasound image  demonstrating needle  tip in the vessel lumen was saved and archived to PACS. The  micropuncture needle was exchanged for a 5  Georgian 10 cm vascular  sheath using modified Seldinger technique.    0.035 Bentson guidewire and C2 catheter advanced to the abdominal  aorta and used to selectively catheterize the celiac artery. Catheter  and 0.035 angle Glidewire then advanced the right hepatic artery.    Hepatic angiogram performed demonstrating enhancement of the large  right lobe tumor mass without evidence of active extravasation.  Catheter was retracted and limited angiogram from the celiac artery  performed demonstrating patent vasculature with a proximal small  vessel which appeared to progress to the right upper quadrant. This is  selectively catheterized with the C2 catheter and angiogram performed  demonstrated likely duplicated after duodenal artery supplies the  right upper quadrant tumor/hepatic flexure of the colon.    This was in the area previously seen bleed artery was selectively  catheterized with a direction microcatheter and wire angiography  performed from this location demonstrates no clear evidence of tumor  supply.    Microcatheter removed and base catheter placed in the common hepatic  artery. Power injection performed from this location demonstrated  patent vasculature with large right lobe tumor mass again noted with 2  canal and branch vessels from the common hepatic artery, presumably  gastroduodenal artery and gastroepiploic artery. 2 branch vessels are  noted to progress to the right in the area of previously seen  hemorrhage. The previously catheterize branch of the gastroduodenal  artery was again catheterized and selective injection performed which  demonstrated no clear evidence of active extravasation.    At this point the procedure, the patient had become progressively more  agitated and required with upper and lower extremity restraints as  well as physical restraint by support  staff. Despite restraints and  frequent renal orientation by nursing, the patient managed to dislodge  the right common femoral sheath which was able to replaced over the  wire. Given inability to tolerate the procedure, decision was made to  end the procedure.    Catheter removed. Wire maintained for access in case she became  dislodged again. Arteriotomy closure performed with Starclose device.  No immediate complication.      Impression    Impression:    1. Selective catheterization and angiography of the common hepatic  artery demonstrates known large right lobe tumor mass with 2 downgoing  branch vessels from the common hepatic artery, presumably a  gastroepiploic and gastroduodenal arteries.   2. Selective catheterization and angiography of the gastroduodenal  artery and subselective branches directed towards the right upper  quadrant demonstrated no clear evidence of active extravasation.  3. Selective catheterization and angiography of the right hepatic  artery demonstrates supply to large right lobe hepatic tumor.    Plan:   Patient to floor for continued cares. Continue close hemodynamic  monitoring. Patient decompensates and hemodynamic standpoint, repeat  angiography could be performed though would require general anesthesia  given intolerance to moderate sedation.   CT Chest w/o Contrast    Narrative    EXAMINATION: Chest CT 5/12/2018 2:53 PM.    CLINICAL HISTORY: leukocytosis, new diagnosis HCC w/ mets- eval for  PNA, pulmonary mets;     COMPARISON: Outside chest CT 1/17/2018    TECHNIQUE: Acquisition of CT images through the chest without  contrast. Coronal and axial MIP reformatted images obtained.    FINDINGS:    The thyroid is unremarkable. No axillary lymphadenopathy. A few small  mediastinal nodes. The central tracheobronchial tree is patent. The  heart is not enlarged. No pericardial effusion. No pleural effusion.    Multiple pulmonary nodules (greater than 20), bilateral but  predominantly in  the right lower lobe. The largest lesion measures 1.4  cm. Small area of groundglass opacity along the peripheral aspect of  the right middle lobe. There are tree in bud opacities in the right  upper and right lower lobes.     Limited evaluation of the upper abdomen. Large right hepatic lobe mass  and peritoneal implants consistent with known HCC is better evaluated  on CT abdomen 5/9/2018.    No worrisome osseous abnormality.      Impression    IMPRESSION:   1. Innumerable pulmonary nodules measuring up to 1.4 cm, consistent  with metastatic disease.  2. Small area of groundglass opacity along the peripheral aspect of  the right middle lobe and tree in bud opacities; differential includes  metastatic disease versus infection.  3. Liver mass and peritoneal carcinomatosis    I have personally reviewed the examination and initial interpretation  and I agree with the findings.    MARK ANTHONY ROSS MD   INR   Result Value Ref Range    INR 0.98 0.86 - 1.14   CBC with platelets differential   Result Value Ref Range    WBC 9.8 4.0 - 11.0 10e9/L    RBC Count 5.14 4.4 - 5.9 10e12/L    Hemoglobin 15.9 13.3 - 17.7 g/dL    Hematocrit 49.0 40.0 - 53.0 %    MCV 95 78 - 100 fl    MCH 30.9 26.5 - 33.0 pg    MCHC 32.4 31.5 - 36.5 g/dL    RDW 15.7 (H) 10.0 - 15.0 %    Platelet Count 240 150 - 450 10e9/L    Diff Method Automated Method     % Neutrophils 63.9 %    % Lymphocytes 23.3 %    % Monocytes 8.9 %    % Eosinophils 2.7 %    % Basophils 0.7 %    % Immature Granulocytes 0.5 %    Nucleated RBCs 0 0 /100    Absolute Neutrophil 6.3 1.6 - 8.3 10e9/L    Absolute Lymphocytes 2.3 0.8 - 5.3 10e9/L    Absolute Monocytes 0.9 0.0 - 1.3 10e9/L    Absolute Eosinophils 0.3 0.0 - 0.7 10e9/L    Absolute Basophils 0.1 0.0 - 0.2 10e9/L    Abs Immature Granulocytes 0.1 0 - 0.4 10e9/L    Absolute Nucleated RBC 0.0    Leukemia Lymphoma Evaluation (Flow Cytometry)   Result Value Ref Range    Copath Report       Patient Name: CARA FARFAN  MR#:  [id number]  Specimen #: [id number]  Collected: 5/9/2018 13:00  Received: 5/9/2018 15:15  Reported: 5/10/2018 11:25  Ordering Phy(s): KIARA ZARATE    For improved result formatting, select 'View Enhanced Report Format' under   Linked Documents section.  _________________________________________    SPECIMEN(S):  Omentum biopsy    INTERPRETATION:  Omentum biopsy  Canceled-Test Credited. The physician, Dr. Avendano, notified/approved on   05/10/18 at 11:20.    I have personally reviewed all specimens and/or slides, including the   listed special stains, and used them  with my medical judgment to determine the final diagnosis.    Electronically signed out by:    [facility] Technical Staff    This test was developed and its performance characteristics determined by   [facility]. It has not been cleared or   approved by the US Food and Drug  Administration.  FDA does not require this test to go throug h premarket   FDA review. This test is used for  clinical purposes and should not be regarded as investigational or for   research.  This laboratory is certified  under the Clinical Laboratory Improvement Amendments (CLIA) as qualified   to perform high complexity clinical  laboratory testing.    CPT Codes:    TESTING LAB LOCATION:  [address] [address]  [phone]    COLLECTION SITE:  Client:  [facility]  Location:  [facility] (B)     Hemoglobin   Result Value Ref Range    Hemoglobin 13.1 (L) 13.3 - 17.7 g/dL   Comprehensive metabolic panel   Result Value Ref Range    Sodium 137 133 - 144 mmol/L    Potassium 4.3 3.4 - 5.3 mmol/L    Chloride 104 94 - 109 mmol/L    Carbon Dioxide 25 20 - 32 mmol/L    Anion Gap 8 3 - 14 mmol/L    Glucose 227 (H) 70 - 99 mg/dL    Urea Nitrogen 16 7 - 30 mg/dL    Creatinine 0.85 0.66 - 1.25 mg/dL    GFR Estimate >90 >60  mL/min/1.7m2    GFR Estimate If Black >90 >60 mL/min/1.7m2    Calcium 8.4 (L) 8.5 - 10.1 mg/dL    Bilirubin Total 0.6 0.2 - 1.3 mg/dL    Albumin 3.1 (L) 3.4 - 5.0 g/dL    Protein Total 7.7 6.8 - 8.8 g/dL    Alkaline Phosphatase 126 40 - 150 U/L    ALT 69 0 - 70 U/L     (H) 0 - 45 U/L   Magnesium   Result Value Ref Range    Magnesium 2.2 1.6 - 2.3 mg/dL   Hemoglobin   Result Value Ref Range    Hemoglobin 11.7 (L) 13.3 - 17.7 g/dL   CBC with platelets   Result Value Ref Range    WBC 20.9 (H) 4.0 - 11.0 10e9/L    RBC Count 3.55 (L) 4.4 - 5.9 10e12/L    Hemoglobin 10.7 (L) 13.3 - 17.7 g/dL    Hematocrit 33.5 (L) 40.0 - 53.0 %    MCV 94 78 - 100 fl    MCH 30.1 26.5 - 33.0 pg    MCHC 31.9 31.5 - 36.5 g/dL    RDW 15.9 (H) 10.0 - 15.0 %    Platelet Count 282 150 - 450 10e9/L   Comprehensive metabolic panel   Result Value Ref Range    Sodium 138 133 - 144 mmol/L    Potassium 4.7 3.4 - 5.3 mmol/L    Chloride 108 94 - 109 mmol/L    Carbon Dioxide 20 20 - 32 mmol/L    Anion Gap 10 3 - 14 mmol/L    Glucose 115 (H) 70 - 99 mg/dL    Urea Nitrogen 23 7 - 30 mg/dL    Creatinine 0.77 0.66 - 1.25 mg/dL    GFR Estimate >90 >60 mL/min/1.7m2    GFR Estimate If Black >90 >60 mL/min/1.7m2    Calcium 8.2 (L) 8.5 - 10.1 mg/dL    Bilirubin Total 0.6 0.2 - 1.3 mg/dL    Albumin 2.8 (L) 3.4 - 5.0 g/dL    Protein Total 7.0 6.8 - 8.8 g/dL    Alkaline Phosphatase 100 40 - 150 U/L    ALT 72 (H) 0 - 70 U/L     (H) 0 - 45 U/L   Lactic acid level STAT for sepsis protocol   Result Value Ref Range    Lactate for Sepsis Protocol 1.1 0.4 - 1.9 mmol/L   Hemoglobin   Result Value Ref Range    Hemoglobin 9.9 (L) 13.3 - 17.7 g/dL   Hemoglobin   Result Value Ref Range    Hemoglobin 9.0 (L) 13.3 - 17.7 g/dL   Hemoglobin   Result Value Ref Range    Hemoglobin 9.3 (L) 13.3 - 17.7 g/dL   Hemoglobin   Result Value Ref Range    Hemoglobin 8.1 (L) 13.3 - 17.7 g/dL   Hemoglobin   Result Value Ref Range    Hemoglobin 8.3 (L) 13.3 - 17.7 g/dL   Hemoglobin    Result Value Ref Range    Hemoglobin 8.3 (L) 13.3 - 17.7 g/dL   Hemoglobin   Result Value Ref Range    Hemoglobin 8.7 (L) 13.3 - 17.7 g/dL   Hemoglobin   Result Value Ref Range    Hemoglobin 8.0 (L) 13.3 - 17.7 g/dL   Hemoglobin   Result Value Ref Range    Hemoglobin 8.3 (L) 13.3 - 17.7 g/dL   Hemoglobin   Result Value Ref Range    Hemoglobin 8.2 (L) 13.3 - 17.7 g/dL   Lactic acid level STAT for sepsis protocol   Result Value Ref Range    Lactate for Sepsis Protocol 0.4 0.4 - 1.9 mmol/L   CBC with platelets   Result Value Ref Range    WBC 12.9 (H) 4.0 - 11.0 10e9/L    RBC Count 2.87 (L) 4.4 - 5.9 10e12/L    Hemoglobin 8.5 (L) 13.3 - 17.7 g/dL    Hematocrit 27.9 (L) 40.0 - 53.0 %    MCV 97 78 - 100 fl    MCH 29.6 26.5 - 33.0 pg    MCHC 30.5 (L) 31.5 - 36.5 g/dL    RDW 17.0 (H) 10.0 - 15.0 %    Platelet Count 199 150 - 450 10e9/L   Comprehensive metabolic panel   Result Value Ref Range    Sodium 138 133 - 144 mmol/L    Potassium 4.1 3.4 - 5.3 mmol/L    Chloride 103 94 - 109 mmol/L    Carbon Dioxide 29 20 - 32 mmol/L    Anion Gap 6 3 - 14 mmol/L    Glucose 87 70 - 99 mg/dL    Urea Nitrogen 10 7 - 30 mg/dL    Creatinine 0.53 (L) 0.66 - 1.25 mg/dL    GFR Estimate >90 >60 mL/min/1.7m2    GFR Estimate If Black >90 >60 mL/min/1.7m2    Calcium 8.1 (L) 8.5 - 10.1 mg/dL    Bilirubin Total 1.2 0.2 - 1.3 mg/dL    Albumin 2.7 (L) 3.4 - 5.0 g/dL    Protein Total 7.2 6.8 - 8.8 g/dL    Alkaline Phosphatase 115 40 - 150 U/L    ALT 92 (H) 0 - 70 U/L     (HH) 0 - 45 U/L   Hemoglobin   Result Value Ref Range    Hemoglobin 8.4 (L) 13.3 - 17.7 g/dL   Hemoglobin   Result Value Ref Range    Hemoglobin 8.4 (L) 13.3 - 17.7 g/dL   AFP tumor marker   Result Value Ref Range    Alpha Fetoprotein 9.1 (H) 0 - 8 ug/L   Hemoglobin   Result Value Ref Range    Hemoglobin 8.6 (L) 13.3 - 17.7 g/dL   Hemoglobin   Result Value Ref Range    Hemoglobin 8.2 (L) 13.3 - 17.7 g/dL   Lactic acid level STAT for sepsis protocol   Result Value Ref Range     Lactate for Sepsis Protocol 0.7 0.4 - 1.9 mmol/L   Hemoglobin   Result Value Ref Range    Hemoglobin 7.5 (L) 13.3 - 17.7 g/dL   Comprehensive metabolic panel   Result Value Ref Range    Sodium 136 133 - 144 mmol/L    Potassium 3.6 3.4 - 5.3 mmol/L    Chloride 103 94 - 109 mmol/L    Carbon Dioxide 28 20 - 32 mmol/L    Anion Gap 6 3 - 14 mmol/L    Glucose 95 70 - 99 mg/dL    Urea Nitrogen 7 7 - 30 mg/dL    Creatinine 0.49 (L) 0.66 - 1.25 mg/dL    GFR Estimate >90 >60 mL/min/1.7m2    GFR Estimate If Black >90 >60 mL/min/1.7m2    Calcium 7.7 (L) 8.5 - 10.1 mg/dL    Bilirubin Total 1.4 (H) 0.2 - 1.3 mg/dL    Albumin 2.4 (L) 3.4 - 5.0 g/dL    Protein Total 6.5 (L) 6.8 - 8.8 g/dL    Alkaline Phosphatase 96 40 - 150 U/L    ALT 63 0 - 70 U/L     (H) 0 - 45 U/L   INR   Result Value Ref Range    INR 1.15 (H) 0.86 - 1.14   Hemoglobin   Result Value Ref Range    Hemoglobin 8.0 (L) 13.3 - 17.7 g/dL   CRP inflammation   Result Value Ref Range    CRP Inflammation 65.0 (H) 0.0 - 8.0 mg/L   Nt probnp inpatient   Result Value Ref Range    N-Terminal Pro BNP Inpatient 1328 (H) 0 - 900 pg/mL   Procalcitonin   Result Value Ref Range    Procalcitonin 0.50 ng/ml   CBC with platelets   Result Value Ref Range    WBC 10.3 4.0 - 11.0 10e9/L    RBC Count 2.68 (L) 4.4 - 5.9 10e12/L    Hemoglobin 8.0 (L) 13.3 - 17.7 g/dL    Hematocrit 25.6 (L) 40.0 - 53.0 %    MCV 96 78 - 100 fl    MCH 29.9 26.5 - 33.0 pg    MCHC 31.3 (L) 31.5 - 36.5 g/dL    RDW 15.8 (H) 10.0 - 15.0 %    Platelet Count 201 150 - 450 10e9/L   Hemoglobin   Result Value Ref Range    Hemoglobin 8.4 (L) 13.3 - 17.7 g/dL   Hemoglobin   Result Value Ref Range    Hemoglobin 8.3 (L) 13.3 - 17.7 g/dL   CBC with platelets   Result Value Ref Range    WBC 9.4 4.0 - 11.0 10e9/L    RBC Count 2.76 (L) 4.4 - 5.9 10e12/L    Hemoglobin 8.2 (L) 13.3 - 17.7 g/dL    Hematocrit 25.8 (L) 40.0 - 53.0 %    MCV 94 78 - 100 fl    MCH 29.7 26.5 - 33.0 pg    MCHC 31.8 31.5 - 36.5 g/dL    RDW 15.8 (H)  "10.0 - 15.0 %    Platelet Count 252 150 - 450 10e9/L     *Note: Due to a large number of results and/or encounters for the requested time period, some results have not been displayed. A complete set of results can be found in Results Review.     FINAL DIAGNOSIS:   OMENTUM, BIOPSY:   - Metastatic hepatocellular carcinoma     I have personally reviewed all specimens  and/or slides, including the   listed special stains, and used them   with my medical judgement to determine or confirm the final diagnosis.     Electronically signed out by:   Alonzo Avendano M.D., [facility]     CLINICAL HISTORY:   63 year old male with liver mass and omental nodule.   GROSS:   The specimen is received in formalin with proper patient identification,   labeled \"omentum\".  The specimen   consists of multiple pink-tan needle cores ranging in size from 0.2 up to   1.2 cm in length and up to 0.1 cm in   diameter.  The specimen is wrapped and entirely submitted in cassette A1.   (Dictated by: Riana Massey   5/9/2018 02:07 PM)     MICROSCOPIC:   Microscopic examination was performed. Tumor is positive for Arginase-1,   HEP-1, CK AE1/AE3 and CD10 (sinusoid   pattern) while negative for PAX-8 supporting the diagnosis of metastatic   hepatocellular carcinoma.     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    We discussed the situation in detail. His disease is not curable. Any treatment that would be offered would be palliative with the hope of slowing down the growth and possibly shrinking the cancer maintaining his quality of life and possibly increasing survival. We discussed that the standard of treatment would be to try sorafenib. We discussed the rationale schedule and potential side effects of sorafenib in detail.  He tells " me that he is not interested in trying it and he would rather focus on the efforts at trying to make him as comfortable as possible.    We also discussed the other option of comfort care with hospice and we spent quite a long time today discussing that. He is willing to talk to hospice and I gave him a refill for that.    Abdominal discomfort. This is due to the large liver lesion as well as omental/peritoneal spread of the cancer. Part of the discomfort could also be due to the recent hemorrhage after the biopsy. At this time it is mild and manageable without any pain medications. He will likely need more pain medications as the disease progresses and his pain would be managed by hospice in the future    He carries a diagnosis of porphyria but I do not know the details and he also is not aware of the details. He tells me that he never had any issues related to it. Since he is opting for hospice at this time I do not believe that doing a thorough workup for that is warranted    I answered all of his and his wife's questions to their satisfaction and they are agreeable and comfortable with the plan    Jesse Choe

## 2018-05-17 NOTE — NURSING NOTE
"Oncology Rooming Note    May 17, 2018 4:53 PM   Naseem Ferrer is a 63 year old male who presents for:    Chief Complaint   Patient presents with     Oncology Clinic Visit     New - HCC/Inpt      Initial Vitals: /77 (BP Location: Right arm, Patient Position: Chair, Cuff Size: Adult Regular)  Pulse 96  Temp 98.5  F (36.9  C) (Oral)  Ht 1.626 m (5' 4.02\")  Wt 65.5 kg (144 lb 6.4 oz)  SpO2 91%  BMI 24.77 kg/m2 Estimated body mass index is 24.77 kg/(m^2) as calculated from the following:    Height as of this encounter: 1.626 m (5' 4.02\").    Weight as of this encounter: 65.5 kg (144 lb 6.4 oz). Body surface area is 1.72 meters squared.  Data Unavailable Comment: Data Unavailable   No LMP for male patient.  Allergies reviewed: Yes  Medications reviewed: Yes    Medications:  No refills today   Pharmacy name entered into [facility]: [facility] [address] [place] [address] [address] [place] [address]    Clinical concerns: would like some pain medication at night  Dr. Choe was NOT notified.    10 minutes for nursing intake (face to face time)     Janell Decker CMA            "

## 2018-06-11 PROBLEM — C22.0 HCC (HEPATOCELLULAR CARCINOMA) (H): Status: ACTIVE | Noted: 2018-01-01

## 2018-06-11 PROBLEM — R16.0 LIVER MASS: Status: RESOLVED | Noted: 2018-01-01 | Resolved: 2018-01-01

## 2018-06-11 PROBLEM — K76.9 LIVER LESION: Status: RESOLVED | Noted: 2018-01-01 | Resolved: 2018-01-01

## 2018-06-11 NOTE — TELEPHONE ENCOUNTER
----- Message from Génesis Spencer RN sent at 6/11/2018  2:53 PM CDT -----  Regarding: Hospice referral  Joshua Gusman's spouse called back regarding hospice, they would like to proceed with hospice enrollment. I put in a referral and gave his wife the hospice number to call if she has not heard from someone tomorrow. She also has questions/concerns about hospice coverage - any out of pocket costs or restrictions on hospice providers.     Jazmine - not sure if you know more about this, I thought hospice was covered 100% with no networks applying. Also do you know of any other hospice agencies in the area? Not sure if  Hospice services the [facility]?     Thanks  Nallely Capps

## 2018-06-11 NOTE — MR AVS SNAPSHOT
"              After Visit Summary   6/11/2018    Naseem Ferrer    MRN: [id number]           Patient Information     Date Of Birth          1955        Visit Information        Provider Department      6/11/2018 11:30 AM Dacia Juan MD [facility] Hepatology        Today's Diagnoses     HCC (hepatocellular carcinoma) (H)    -  1       Follow-ups after your visit        Who to contact     If you have questions or need follow up information about today's clinic visit or your schedule please contact [facility]PAT[facility] directly at [phone].  Normal or non-critical lab and imaging results will be communicated to you by MyChart, letter or phone within 4 business days after the clinic has received the results. If you do not hear from us within 7 days, please contact the clinic through MyChart or phone. If you have a critical or abnormal lab result, we will notify you by phone as soon as possible.  Submit refill requests through [employer] or call your pharmacy and they will forward the refill request to us. Please allow 3 business days for your refill to be completed.          Additional Information About Your Visit        Care EveryWhere ID     This is your Care EveryWhere ID. This could be used by other organizations to access your [facility] medical records  [id number]        Your Vitals Were     Pulse Temperature Height Pulse Oximetry BMI (Body Mass Index)       80 98.5  F (36.9  C) (Oral) 1.626 m (5' 4.02\") 92% 23.33 kg/m2        Blood Pressure from Last 3 Encounters:   06/11/18 159/84   05/17/18 144/77   05/14/18 123/83    Weight from Last 3 Encounters:   06/11/18 61.7 kg (136 lb)   05/17/18 65.5 kg (144 lb 6.4 oz)   05/14/18 65 kg (143 lb 3.2 oz)              Today, you had the following     No orders found for display       Primary Care Provider Office Phone # Fax #    Jonna Clements [phone]       [facility] [address] [place] [id number]        Equal Access to " Services     [facility]: Hadii norma weems ron Saavedra, waaxda luqadaha, qaybta kaalkyrie faustinojordypaolo, waxlisa oksana cortesalvinjeanmarie garcia . So [facility] [phone].    ATENCIÓN: Si jaylala brooke, tiene a bhagat disposición servicios gratuitos de asistencia lingüística. Llame al [phone].    We comply with applicable federal civil rights laws and [place] laws. We do not discriminate on the basis of race, color, national origin, age, disability, sex, sexual orientation, or gender identity.            Thank you!     Thank you for choosing [facility] HEPATOLOGY  for your care. Our goal is always to provide you with excellent care. Hearing back from our patients is one way we can continue to improve our services. Please take a few minutes to complete the written survey that you may receive in the mail after your visit with us. Thank you!             Your Updated Medication List - Protect others around you: Learn how to safely use, store and throw away your medicines at www.disposemymeds.org.          This list is accurate as of 6/11/18  2:47 PM.  Always use your most recent med list.                   Brand Name Dispense Instructions for use Diagnosis    albuterol 108 (90 Base) MCG/ACT Inhaler    PROAIR HFA/PROVENTIL HFA/VENTOLIN HFA     Inhale 2 puffs into the lungs every 4 hours as needed for shortness of breath / dyspnea or wheezing        aspirin 81 MG tablet      Take 81 mg by mouth daily        fluticasone 50 MCG/ACT spray    FLONASE     Spray 2 sprays into both nostrils daily        fluticasone-salmeterol 250-50 MCG/DOSE diskus inhaler    ADVAIR    1 Inhaler    Inhale 1 puff into the lungs 2 times daily    Chronic obstructive pulmonary disease, unspecified COPD type (H)       ipratropium - albuterol 0.5 mg/2.5 mg/3 mL 0.5-2.5 (3) MG/3ML neb solution    DUONEB    360 mL    Take 1 vial (3 mLs) by nebulization 4 times daily    Chronic obstructive pulmonary disease, unspecified COPD type (H)       loratadine 10 MG  tablet    CLARITIN    30 tablet    Take 1 tablet (10 mg) by mouth daily    Chronic obstructive pulmonary disease, unspecified COPD type (H), Acute seasonal allergic rhinitis, unspecified trigger       thiamine 100 MG tablet     30 tablet    Take 1 tablet (100 mg) by mouth daily    Alcohol abuse

## 2018-06-11 NOTE — PROGRESS NOTES
A/P  63 y M HCV cirrhosis with metastatic HCC, biopsy proven. Hemorrhage of tumor in January. I reviewed his diagnosis and goals of care today    Explained the roles of various care providers. I agree with Dr. Choe that he should pursue hospice. I also discussed palliative care. They told me today they are ready to call them. I explained the support they could offer including symptom management    I do not have ay further therapeutic or diagnostic goals for him. He does not need to come back to the liver clinic.    This was a 45 minute visit, over 50% counseling and coordination of care.    Dacia Juan MD  Hepatology/Liver Transplant  Medical Director, Liver Transplantation  [facility]  ===================================================================      S: 62 yo M with HCC, biopsy proven. H/o tumor rupture with hemorrhage. No options for treatment.  HCV cirrhosis. He is here with his wife, daughter, brother and family friend.     He was first diagnosed in January when the mass bled. It was not clearly HCC so biopsy was pursued. First biopsy was non diagnostic. On 5/9/18 he underwent another biopsy and it was consistent with metastatic HCC. He unfortunately had intraabdominal hemorrhage after the biopsy. His CT chest shows multiple pulmonary nodules c/w metastatic HCC and the liver imaging shows the large mass involving the majority of the liver with several satellites, as well as tumor thrombus.    He indicates his goals are to be pain free. He describes his pain as a stitch in his back and abdomen. He has pain nearly all the time. He says it is bearable. It affects his ability to sleep. The pain started when he had the tumor rupture. It seems to be about the same. He has not had any treatment for the pain. He doesn't want to be addicted to pain medications. He had a friend who had a pain medication addiction. He doesn't have an appetite. Some of it is because of the pain, some is because of  "not feeling well.   He has trouble sleeping because of the pain. His wife states she is \"very very very against\" any time of pain medication.    He would like to live long enough to see his daughter graduate from high school. She is 8.     He is losing weight. Trying to cook with higher fat to help him gain weight.    Found out his sister had liver cancer upon her death. She had hepatitis C and developed HCC and lived a few years with that diagnosis so they are hopeful.    He is not using oxygen at any time now.     He saw Dr. Choe on 5/17/18. They were given information on hospice. They decided not to contact them because he thinks he will live longer than a year. When he saw Dr. Choe, per notes, the abdominal pain was not that bothersome.     The patient and his wife do think he is doing better though, as they repeatedly state.    O: /84  Pulse 80  Temp 98.5  F (36.9  C) (Oral)  Ht 1.626 m (5' 4.02\")  Wt 61.7 kg (136 lb)  SpO2 92%  BMI 23.33 kg/m2   /84  Pulse 80  Temp 98.5  F (36.9  C) (Oral)  Ht 1.626 m (5' 4.02\")  Wt 61.7 kg (136 lb)  SpO2 92%  BMI 23.33 kg/m2    Constitutional: alert and no distress. Thin.  Head: Normocephalic. No masses, lesions, tenderness or abnormalities  Neck: Neck supple. No adenopathy. Thyroid symmetric, normal size  ENT: ENT exam normal, no neck nodes or sinus tenderness. No oral lesions  Cardiovascular: negative, No lifts, heaves, or thrills. RRR. No murmurs, clicks gallops or rub  Respiratory: negative, Good diaphragmatic excursion. Lungs clear  Gastrointestinal: Abdomen soft, non-tender. BS normal.  No masses, organomegaly  Skin: no suspicious lesions or rashes. No spider angiomata or palmar erythema. Nails normal.  Neurologic: Gait normal. Reflexes normal and symmetric. Sensation grossly WNL.  Psychiatric:  Appropriate, well groomed.  Hematologic/Lymphatic/Immunologic: Normal cervical and supraclavicular  lymph nodes    Labs from today reviewed.Liver tests " are improved.

## 2018-06-11 NOTE — LETTER
6/11/2018      RE: Naseem Ferrer  [address] [place] [address]       A/P  63 y M HCV cirrhosis with metastatic HCC, biopsy proven. Hemorrhage of tumor in January. I reviewed his diagnosis and goals of care today    Explained the roles of various care providers. I agree with Dr. Choe that he should pursue hospice. I also discussed palliative care. They told me today they are ready to call them. I explained the support they could offer including symptom management    I do not have ay further therapeutic or diagnostic goals for him. He does not need to come back to the liver clinic.    This was a 45 minute visit, over 50% counseling and coordination of care.    Dacia Juan MD  Hepatology/Liver Transplant  Medical Director, Liver Transplantation  [facility]  ===================================================================      S: 62 yo M with HCC, biopsy proven. H/o tumor rupture with hemorrhage. No options for treatment.  HCV cirrhosis. He is here with his wife, daughter, brother and family friend.     He was first diagnosed in January when the mass bled. It was not clearly HCC so biopsy was pursued. First biopsy was non diagnostic. On 5/9/18 he underwent another biopsy and it was consistent with metastatic HCC. He unfortunately had intraabdominal hemorrhage after the biopsy. His CT chest shows multiple pulmonary nodules c/w metastatic HCC and the liver imaging shows the large mass involving the majority of the liver with several satellites, as well as tumor thrombus.    He indicates his goals are to be pain free. He describes his pain as a stitch in his back and abdomen. He has pain nearly all the time. He says it is bearable. It affects his ability to sleep. The pain started when he had the tumor rupture. It seems to be about the same. He has not had any treatment for the pain. He doesn't want to be addicted to pain medications. He had a friend who had a pain medication  "addiction. He doesn't have an appetite. Some of it is because of the pain, some is because of not feeling well.   He has trouble sleeping because of the pain. His wife states she is \"very very very against\" any time of pain medication.    He would like to live long enough to see his daughter graduate from high school. She is 8.     He is losing weight. Trying to cook with higher fat to help him gain weight.    Found out his sister had liver cancer upon her death. She had hepatitis C and developed HCC and lived a few years with that diagnosis so they are hopeful.    He is not using oxygen at any time now.     He saw Dr. Choe on 5/17/18. They were given information on hospice. They decided not to contact them because he thinks he will live longer than a year. When he saw Dr. Choe, per notes, the abdominal pain was not that bothersome.     The patient and his wife do think he is doing better though, as they repeatedly state.    O: /84  Pulse 80  Temp 98.5  F (36.9  C) (Oral)  Ht 1.626 m (5' 4.02\")  Wt 61.7 kg (136 lb)  SpO2 92%  BMI 23.33 kg/m2   /84  Pulse 80  Temp 98.5  F (36.9  C) (Oral)  Ht 1.626 m (5' 4.02\")  Wt 61.7 kg (136 lb)  SpO2 92%  BMI 23.33 kg/m2    Constitutional: alert and no distress. Thin.  Head: Normocephalic. No masses, lesions, tenderness or abnormalities  Neck: Neck supple. No adenopathy. Thyroid symmetric, normal size  ENT: ENT exam normal, no neck nodes or sinus tenderness. No oral lesions  Cardiovascular: negative, No lifts, heaves, or thrills. RRR. No murmurs, clicks gallops or rub  Respiratory: negative, Good diaphragmatic excursion. Lungs clear  Gastrointestinal: Abdomen soft, non-tender. BS normal.  No masses, organomegaly  Skin: no suspicious lesions or rashes. No spider angiomata or palmar erythema. Nails normal.  Neurologic: Gait normal. Reflexes normal and symmetric. Sensation grossly WNL.  Psychiatric:  Appropriate, well " groomed.  Hematologic/Lymphatic/Immunologic: Normal cervical and supraclavicular  lymph nodes    Labs from today reviewed.Liver tests are improved.           Dacia Juan MD

## 2018-06-12 NOTE — PROGRESS NOTES
VM from pt's wife, Miya, asking for an Rx for medical marijuana for appetite and for pain control    Return call to pt's wife to discuss the options for medical marijuana, and that there is also the option to get an Rx for Marinol as medical marijuana can be very expensive. Miya stated that she is not concerned with the cost of the Rx, and is most concerned with pt's comfort. Advised Miya to discuss this need with the intake nurse who is coming out from [facility] tomorrow as they will be appointing an attending physician for pt since they do not want to go through pt's PCP for hospice care. Miya stated understanding.     TC to [facility]. Spoke with Nedra, the nurse who will be doing the intake for pt tomorrow to let her know that pt will need an attending provider appointed, and that pt is interested in medical marijuana. Nedra stated that the  Hospice will be the attending provider, and that he is able to prescribe medical marijuana. She will discuss this with pt during intake tomorrow.

## 2018-06-12 NOTE — TELEPHONE ENCOUNTER
TC to pt's wife, Miya, to review options for hospice agencies in the [facility]. Gave her the names of two agencies that came up on a Google search in her area: [facility] and [facility]. Advised her to speak with friends and family in the [facility] to see if anyone has had first hand experience with either agency, and to let writer know once she makes a decision so a referral can be sent. As far as benefits go, advised she call the number on the back of her insurance card, and that pt's benefits will be checked by the hospice agency as well. Miya stated understanding and agreed to call the clinic once she decides on an agency.

## 2018-09-07 NOTE — TELEPHONE ENCOUNTER
"Spoke with Jodi.  Patient has HCC due to hep C.    He has been in hospice but not worsening. No treatment available. He is full code. He has not worsened and will \"graduate\" hospice.     He has pain - abdominal pain - due to his cancer. He is looking for medical marijuana and has been certified.     Has COPD. Low dose of dexamethasone. Will continue. Has zpak on hand if needed. Needs plan for treatment of pneumonia.     May return to hospice in the future.     Has declined further scans.     See last note from Dr. Ryan.           "

## 2018-09-07 NOTE — PROGRESS NOTES
Hospice MD visit  location//home  reason for visit// assess symptoms  HPI// Pt is a 62 yo man with metastatic hepatocellular carcinoma with mets to lung and peritoneum, hepatitis C, thrombus of the right hepatic vein, h/o weight loss, depression and severe COPD. Reviewed medical records and spoke with his oncologist at [facility], Dr. Choe. Has a history of frequent pneumonia per patient, 4-5 episodes per year (vs copd exacerbations). In January had worsening ab pain, and CT was done revealing large liver lesion, subsequent drop in hemoglobin and spontaneous bleeding from the liver mass. Further workup revealed HCC with metastases to the lung.  Currently states he continues to feel stronger.  Having new twinges of right upper ab pain, radiating around to the back, no relieving or exacerbating factors, no associated symptoms. Also has his chronic pain in the left shoulder and hip, generalized arthritis pain in the knees, low back (history of DDD lumbar spine). Has been using oxycodone 1-2 pills per day.    BMs are daily, Eating more fruit to increase fiber. No nausea, denies ab fullness. No urinary complaints. Chronic SOB and sputum production from COPD, using dexamethasone 4 mg daily with duonebs 4-6 times a day. Coughs with mucous daily, gets short of breath with  activity such as stairs, prolonged walking, but feels it's not that bad, he's used to it. Did quit smoking in the past, now back to smoking 4 cig/day. Has a history of pneumonia, more likely to be copd flares, that have resolved with oral steroids and azithromycin in the past.   Denies dizziness, falls.  Has had weight loss with hospitalizations, but weight and appetite have rebounded. Is taking medical marijuana for weight loss and breathing issues.   Honestly, patient has had no decline at all in the 3 months he has been on hospice. He continues to be full code. He wants to live, goal is to see his 8 year old daughter graduate from high school.   Medications//  reviewed in detail  PMH//htn and as above. PSH//left rotator cuff repair x 3. h/o CTS. Social hx// , lives with wife and 9 yo dtr. Has 2 older children (ages 34, 35) from previous relationship and 4 grandchildren. 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 FHx// sister with HCC, brother recently  of unknown cancer. 12 point ROS negative except as per HPI.  CODE STATUS FULL CODE  Objective  Awake, alert, significantly less dyspneic today.   no scleral icterus. Skin//jaundice improved, just tan.   Psych// mood euthymic, alert and oriented x 3  Assessment// 64 yo with metastatic HCC, severe COPD. His prognosis is poor given his cancer. Patients with metastatic HCC who do not do palliation have a 10% one year survival per Dr. Choe. The patient and his wife do not want to discuss time frames, they find that it makes them anxious. Pt expresses that he wants to live his life to the fullest.   Plan//   The patient no longer meets hospice criteria, given his lack of decline. Plans are underway to coordinate his discharge from hospice and arrange follow up with primary care, oncology if he chooses to.   1. pain.  continue dexamethasone, Continue oxycodone, 5 mg q 4 hours prn pain/sob.   2. SOB// copd is related to hospice diagnosis, is severe. continue qid nebs with q 4 hours prn. Albuterol MDI for when he travels outside the home. Dexmethasone helping.  Recommend continue Dexamethasone (has tolerated better than prednisone in past), annemarie. Will have prescription on hand for azithromycin for him at his local [facility] pharmacy, should he have a copd flare. Reviewed with him and wife when to start this, and to contact primary care after starting.  3. anxorexia with history of weight loss// medical marijuana has helped some4  4. Metastatic HCC. His prognosis is poor, but he is doing well right now. Dr. Choe and I discussed the plan, should he choose to return to oncology. They would do lab  work and repeat imaging, then discuss if there were any chemotherapy treatments that were an option for him. It is unlikely that any chemotherapy would extend his life more than 1-2 months, and would have side effects. Miya Cerrato and I discussed this today, and he is not planning to seek any cancer care at this time. His wife is concerned that repeating the scans will make the 2 of them more anxious. Discussed plan for him to  follow up with primary care. [facility] is closest to him and I have recommended Dr. Cleary.  I would like him to schedule a visit within 1 month and will provide refills on medication to last that long.   Riana Ryan MD

## 2018-09-07 NOTE — TELEPHONE ENCOUNTER
Received a call from Dr. Jodi Ryan, with [facility].  She stated that the patient is graduating from the hospice program with advanced cancer.  He is going to be followed by Dr. Cleary.  He has never seen SF before and Dr. Ryan is requesting SF call her to discuss the patient in a little more detail.    Provider, please call Dr. Ryan at [phone] at your convenience.

## 2018-09-17 NOTE — TELEPHONE ENCOUNTER
Dr. Maldonado calling regarding this patient, who you may be treating so she wants to give you and update.   Please call

## 2018-09-17 NOTE — TELEPHONE ENCOUNTER
Per previous phone encounter on 09/07, patient will be seeing Dr. Cleary. Will route.  Lexy Leiva CMA

## 2018-09-18 NOTE — TELEPHONE ENCOUNTER
Discussed with Dr. Ryan.  She is looking for good transition plan for this patient who is no longer a hospice candidate but has high potential to returning.  I have agreed to accept him as primary care.

## 2018-09-18 NOTE — TELEPHONE ENCOUNTER
Spoke with Dr. Maldonado. Patient is not able to follow up at [facility] due to insurance issues.     Dr. Maldonado was trying to reach out to Chely Chong MD as it appears that she is on his insurance.     Will forward back to  to contact Dr. Maldonado

## 2018-09-21 NOTE — PROGRESS NOTES
SUBJECTIVE:   Naseem Ferrer is a 63 year old male who presents to clinic today for the following health issues:    History of Present Illness   Frequency of exercise:  6-7 days/week  Duration of exercise:  N/A  Additional concerns today:  No  Answers for HPI/ROS submitted by the patient on 9/25/2018   PHQ-2 Score: 3  If you checked off any problems, how difficult have these problems made it for you to do your work, take care of things at home, or get along with other people?: Not difficult at all  PHQ9 TOTAL SCORE: 4    About 6 months ago patient was diagnosed with hepatitis C and found to have a liver mass.  He was subsequently diagnosed with metastatic hepatocellular carcinoma with metastases to the lung and peritoneum.  He also has thrombus of the right hepatic vein and severe COPD.  He had been on hospice however he had been doing very well and has now been discharged from hospice.  He states that he wants to focus on being positive and wants to at least try to beat the odds.  He is not interested in seeing oncology again.  He tells me that he did use a Zithromax a week or 2 ago because of increased yellow sputum.  He feels his breathing is back to baseline.  He does state that he does get short of breath with prolonged walking but feels he is stable.  He has returned to smoking.    Off hospice 9/3, then 9/5 started passing dark black stools (looked like bile), then has been normal since then.    He states that he had been using the oxycodone at night for sleeping and sometimes another time during the day.  However a few days ago he was doing some more activity such as moving a little safe and carrying a laundry basket.  The next day he noticed increased mid back pain.  He states this can shoot around his rib cage.  The pain is quite intense.  He because of this he has now been using oxycodone every 4 hours but he does not feel that this is touching the pain.  During the visit he is continuing shifting and  moving around trying to get comfortable.  He tells me that he has known bulging disks.    He was frustrated filling out the PHQ 9 as he did not feel that the questions adequately dressed what he was going through.  He states that he may have a loss of interest in things but that is because he has an interest in doing other things.  He states he feels he is getting ready for the possibility of dying but yet still trying to enjoy life.  He does not feel the questions captured this.    Problem list and histories reviewed & adjusted, as indicated.  Additional history: as documented    Patient Active Problem List   Diagnosis     COPD (chronic obstructive pulmonary disease) (H)     Essential hypertension     Low back pain     Major depressive disorder without psychotic features     Tobacco abuse     HCC (hepatocellular carcinoma) (H)     Hepatitis C, chronic (H)     Past Surgical History:   Procedure Laterality Date     CARPAL TUNNEL RELEASE RT/LT       ORTHOPEDIC SURGERY       ROTATOR CUFF REPAIR RT/LT         Social History   Substance Use Topics     Smoking status: Current Every Day Smoker     Packs/day: 1.00     Years: 50.00     Last attempt to quit: 1/29/2018     Smokeless tobacco: Never Used     Alcohol use No      Comment: Last drink Jan 27, 2018     Family History   Problem Relation Age of Onset     LUNG DISEASE Mother      Cancer Father      unknown      Cancer Sister      lung           ROS:  CONSTITUTIONAL: Weight stable for the last 3 months.  ENT/MOUTH: NEGATIVE for ear, mouth and throat problems  RESP: He feels the shortness of breath is back to baseline  CV: NEGATIVE for chest pain, palpitations or peripheral edema    OBJECTIVE:     BP (!) 150/94 (BP Location: Right arm, Patient Position: Chair, Cuff Size: Adult Regular)  Pulse 100  Temp 98.9  F (37.2  C) (Temporal)  Resp 16  Wt 135 lb (61.2 kg)  SpO2 93%  BMI 23.16 kg/m2  Body mass index is 23.16 kg/(m^2).  Gen: no apparent distress, he is getting up  and pacing and moving during the exam  Chest: Distant breath sounds, poor air exchange, no rales or wheezes  Cor: regular rate and rhythm  Abd: Soft, nontender, liver extends about 5 cm below his rib cage.  Bowel sounds are normoactive.  Ext: No edema   Psych: Alert and oriented times 3; coherent speech, normal   rate and volume, able to articulate logical thoughts, able   to abstract reason, no tangential thoughts, no hallucinations   or delusions  His affect is neutral.  Back: No obvious spinous process tenderness over the thoracic spine.  Has some mild parathoracic tenderness to palpation.  He is able to twist without issue.  Skin: no rash    ASSESSMENT/PLAN:     1. HCC (hepatocellular carcinoma) (H)  He is currently not on hospice as he was doing well.  He does not want to follow with oncology.  He wants to live life to the fullest.  He is using oxycodone for pain and had been using 1-2 a day but with the increase in his back pain he is now taking 6 a day.  He is hopeful that the pain will improve and he will be able to decrease his pain medications as he does not like to take pills.  I have given him a prescription for 120 tablets.  I would like to see him back in 1 month.    - oxyCODONE IR (ROXICODONE) 5 MG tablet; Take 1 tablet (5 mg) by mouth every 4 hours as needed for pain  Dispense: 120 tablet; Refill: 0    2. COPD exacerbation (H)  He feels he is back to baseline.  He stopped the Advair as he felt that was making him worse instead of better.  He is using his DuoNeb's 4 times a day.  Will have another Zithromax prescription for him to have on hand if he develops another flare.  He is not using oxygen.    - azithromycin (ZITHROMAX) 250 MG tablet; Two tablets first day, then one tablet daily for four days.  Dispense: 6 tablet; Refill: 0    3. Chronic hepatitis C without hepatic coma (H)  Diagnosed in February 2018.  No treatment as he was then diagnosed with hepatocellular carcinoma.    4. Acute midline  thoracic back pain  Discussed obtaining x-ray to look for possible compression fracture.  Discussed that he could have metastases to the bone.  Patient declines imaging at this time.  He like to give this a few more days to see if it improves on its own.  - oxyCODONE IR (ROXICODONE) 5 MG tablet; Take 1 tablet (5 mg) by mouth every 4 hours as needed for pain  Dispense: 120 tablet; Refill: 0  - gabapentin (NEURONTIN) 100 MG capsule; Take 1-2 capsules (100-200 mg) by mouth 3 times daily  Dispense: 90 capsule; Refill: 1    5. Constipation, unspecified constipation type  Discussed constipation with increase in his opioids.  Patient states that he noticed he is becoming more constipated and so is eating bananas and black grapes and feels that this is resolved his issue.    Chely Laguerre MD  [facility]

## 2018-09-25 PROBLEM — B18.2 HEPATITIS C, CHRONIC (H): Status: ACTIVE | Noted: 2018-01-01

## 2018-09-27 NOTE — TELEPHONE ENCOUNTER
Prior Authorization Retail Medication Request    Medication/Dose: roxicodone 5 mg  ICD code (if different than what is on RX):    Previously Tried and Failed:    Rationale:   Patient has reached Max 14 days per 60 days.  Insurance Name:    Insurance ID:        Pharmacy Information (if different than what is on RX)  Name:    Phone:

## 2018-09-27 NOTE — TELEPHONE ENCOUNTER
Prior Authorization Approval    Authorization Effective Date: 9/27/2018  Authorization Expiration Date: 9/27/2021  Medication: roxicodone 5 mg  Approved Dose/Quantity:    Reference #: [id number]   Insurance Company: [employer] - Phone [phone] Fax [phone]  Expected CoPay:       CoPay Card Available:      Foundation Assistance Needed:    Which Pharmacy is filling the prescription (Not needed for infusion/clinic administered): [facility] [address] [place] [address] [id number] [facility]  Pharmacy Notified: Yes  Patient Notified: Yes

## 2018-09-27 NOTE — TELEPHONE ENCOUNTER
Central Prior Authorization Team   Phone: [phone]      [place] Initiation    Medication: roxicodone 5 mg  Insurance Company: [employer] - Phone [phone] Fax [phone]  Pharmacy Filling the Rx: [facility] [address] [place] [address] [address] [place] [address]  Filling Pharmacy Phone: [phone]  Filling Pharmacy Fax:    Start Date: 9/27/2018

## 2018-10-01 NOTE — TELEPHONE ENCOUNTER
Reason for Call:  Medication or medication refill:    Do you use a [facility]?  Name of the pharmacy and phone number for the current request:  [facility] - [phone]    Name of the medication requested: oxycontin    Other request: patient states he was denied this and he is wondering why. He states he is having a lot of pain. He would also like to discuss increase.    Can we leave a detailed message on this number? YES    Phone number patient can be reached at: Home number on file [phone] (home)    Best Time: any    Call taken on 10/1/2018 at 10:03 AM by Lesli Stephens

## 2018-10-01 NOTE — TELEPHONE ENCOUNTER
Attempted to contact patient, phone busy.  Will try back later.    Susie Shepherd, [facility]  October 1, 2018

## 2018-10-01 NOTE — TELEPHONE ENCOUNTER
This medication was filled at his last OV on 9/25/18 and the prior authorization was approved. I spoke to patient and informed him of this. He states that he is in a great deal of pain and would like to meet with the doctor. He has an appointment scheduled for 10/26/18, I offered him an earlier appointment but he declined. He will call back if he wishes to reschedule.

## 2018-10-01 NOTE — TELEPHONE ENCOUNTER
Reason for Call:  Medication or medication refill:    Do you use a [facility]?  Name of the pharmacy and phone number for the current request:  [facility] - [phone]    Name of the medication requested: OxyContin    Other request: patient states that he was told someone can authorize the other half of this Rx if they call [facility].       Can we leave a detailed message on this number? YES    Phone number patient can be reached at: Home number on file [phone] (home)    Best Time: any    Call taken on 10/1/2018 at 2:29 PM by Lesli Stephens

## 2018-10-02 NOTE — TELEPHONE ENCOUNTER
Called and spoke with patient regarding message below.  Patient stated he is in pain and would like to go back on hospice.  Informed patient that is a decision the provider would have to make.  Scheduled patient for an earlier appointment on 10/12/18 @ 10:20am.  Informed patient to call back with any other concerns or questions.  Laura Stephenson CMA ([facility])

## 2018-10-02 NOTE — PROGRESS NOTES
[facility] utilizes an encounter to take the place of a direct phone call to your office. Please take a moment to review the below request. Please reply or route message to author of this encounter.  Message will act as a verbal OK of orders requested below. Thank you.    ORDER    MD SUMMARY/PLAN OF CARE    DISCHARGE SUMMARY    Pt called and is wanting to resume hospice services with [facility].  Medical Director does feel pt continues to qualify based on report of increased symptoms and signs of decline. If you agree we are requesting orders as below.     OK to admit to hospice. OK for hospice orders. OK for current meds and treatments.  MSW eval and treat and admit to hospice by hospice consult.    We are planning to see pt today at 1pm for assess visit with hospice.    Thank you for your assistance with this.     Abigail Franks RN PN  [facility]  Hospice Day Triage RN    [phone]  [email or web address]

## 2018-10-25 ENCOUNTER — TELEPHONE (OUTPATIENT)
Dept: FAMILY MEDICINE | Facility: OTHER | Age: 63
End: 2018-10-25

## 2018-10-25 NOTE — TELEPHONE ENCOUNTER
Reason for Call:  Other    Detailed comments: Patient's wife called clinic to notify provider patient has passed away.     Phone Number Patient can be reached at: Home number on file [phone]    Best Time: any    Can we leave a detailed message on this number? Not Applicable    Call taken on 10/25/2018 at 2:24 PM by Kinjal Lu

## 2024-03-04 NOTE — PROGRESS NOTES
Returned to unit following liver biopsy. Right upper abdominal dressing is dry. No hematoma. Received sedation. Alert and orient. No complaint of nausea or discomfort. Respiratory status stable. Vital signs within normal limits.    36.2

## 2024-07-29 NOTE — PLAN OF CARE
Problem: Patient Care Overview  Goal: Plan of Care/Patient Progress Review  4A - Evaluation completed and treatment initiated.     Discharge Planner PT   Patient plan for discharge: unknown  Current status: Walked 200-250' x 4 with min-CGA while on 6L O2 via oxiplus mask, with ambulation SpO2 87-91%, with standing rest breaks > 88%; with seated rest breaks 92-95%. Pt demonstrates LOB with head turns, quick turns, or distraction; impulsive at times resulting in impaired safety. Recommend ambulation 3x/day with gait belt and staff assistance, recommend use of chair alarm; cues for breathing technique and coughing.  Barriers to return to prior living situation: stairs, balance, impulsive, unknown ability of wife to assist with  out of home as well  Recommendations for discharge: home with assist and outpatient PT pending progress with gait and balance  Rationale for recommendations: primarily limited by balance deficits, impulsive, impaired respiratory status resulting in below baseline functional mobility.        Entered by: Emmie Grossman 02/01/2018 10:14 PM            Detail Level: Detailed Detail Level: Simple

## 2024-10-10 NOTE — MR AVS SNAPSHOT
After Visit Summary   9/25/2018    Naseem Ferrer    MRN: [id number]           Patient Information     Date Of Birth          1955        Visit Information        Provider Department      9/25/2018 3:20 PM Chely Laguerre MD [facility]        Today's Diagnoses     HCC (hepatocellular carcinoma) (H)    -  1    COPD exacerbation (H)        Chronic hepatitis C without hepatic coma (H)        Acute midline thoracic back pain        Constipation, unspecified constipation type           Follow-ups after your visit        Follow-up notes from your care team     Return in about 4 weeks (around 10/23/2018) for Routine Visit.      Who to contact     If you have questions or need follow up information about today's clinic visit or your schedule please contact [facility] directly at [phone].  Normal or non-critical lab and imaging results will be communicated to you by MyChart, letter or phone within 4 business days after the clinic has received the results. If you do not hear from us within 7 days, please contact the clinic through MyChart or phone. If you have a critical or abnormal lab result, we will notify you by phone as soon as possible.  Submit refill requests through [employer] or call your pharmacy and they will forward the refill request to us. Please allow 3 business days for your refill to be completed.          Additional Information About Your Visit        Care EveryWhere ID     This is your Care EveryWhere ID. This could be used by other organizations to access your [facility] medical records  [id number]        Your Vitals Were     Pulse Temperature Respirations Pulse Oximetry BMI (Body Mass Index)       100 98.9  F (37.2  C) (Temporal) 16 93% 23.16 kg/m2        Blood Pressure from Last 3 Encounters:   09/25/18 (!) 150/94   06/11/18 159/84   05/17/18 144/77    Weight from Last 3 Encounters:   09/25/18 135 lb (61.2 kg)   06/11/18 136 lb (61.7 kg)    05/17/18 144 lb 6.4 oz (65.5 kg)              Today, you had the following     No orders found for display         Today's Medication Changes          These changes are accurate as of 9/25/18  4:12 PM.  If you have any questions, ask your nurse or doctor.               Start taking these medicines.        Dose/Directions    gabapentin 100 MG capsule   Commonly known as:  NEURONTIN   Used for:  Acute midline thoracic back pain   Started by:  Chely Laguerre MD        Dose:  100-200 mg   Take 1-2 capsules (100-200 mg) by mouth 3 times daily   Quantity:  90 capsule   Refills:  1       oxyCODONE IR 5 MG tablet   Commonly known as:  ROXICODONE   Used for:  HCC (hepatocellular carcinoma) (H), Acute midline thoracic back pain   Started by:  Chely Laguerre MD        Dose:  5 mg   Take 1 tablet (5 mg) by mouth every 4 hours as needed for pain   Quantity:  120 tablet   Refills:  0            Where to get your medicines      These medications were sent to [address] [address]     Phone:  [phone]     azithromycin 250 MG tablet    gabapentin 100 MG capsule         Some of these will need a paper prescription and others can be bought over the counter.  Ask your nurse if you have questions.     Bring a paper prescription for each of these medications     oxyCODONE IR 5 MG tablet               Information about OPIOIDS     PRESCRIPTION OPIOIDS: WHAT YOU NEED TO KNOW   We gave you an opioid (narcotic) pain medicine. It is important to manage your pain, but opioids are not always the best choice. You should first try all the other options your care team gave you. Take this medicine for as short a time (and as few doses) as possible.    Some activities can increase your pain, such as bandage changes or therapy sessions. It may help to take your pain medicine 30 to 60 minutes before these activities. Reduce your stress by getting enough  non clinical appointment information/Event Note sleep, working on hobbies you enjoy and practicing relaxation or meditation. Talk to your care team about ways to manage your pain beyond prescription opioids.    These medicines have risks:    DO NOT drive when on new or higher doses of pain medicine. These medicines can affect your alertness and reaction times, and you could be arrested for driving under the influence (DUI). If you need to use opioids long-term, talk to your care team about driving.    DO NOT operate heavy machinery    DO NOT do any other dangerous activities while taking these medicines.    DO NOT drink any alcohol while taking these medicines.     If the opioid prescribed includes acetaminophen, DO NOT take with any other medicines that contain acetaminophen. Read all labels carefully. Look for the word  acetaminophen  or  Tylenol.  Ask your pharmacist if you have questions or are unsure.    You can get addicted to pain medicines, especially if you have a history of addiction (chemical, alcohol or substance dependence). Talk to your care team about ways to reduce this risk.    All opioids tend to cause constipation. Drink plenty of water and eat foods that have a lot of fiber, such as fruits, vegetables, prune juice, apple juice and high-fiber cereal. Take a laxative (Miralax, milk of magnesia, Colace, Senna) if you don t move your bowels at least every other day. Other side effects include upset stomach, sleepiness, dizziness, throwing up, tolerance (needing more of the medicine to have the same effect), physical dependence and slowed breathing.    Store your pills in a secure place, locked if possible. We will not replace any lost or stolen medicine. If you don t finish your medicine, please throw away (dispose) as directed by your pharmacist. The [place] Pollution Control Agency has more information about safe disposal: https://www.pca.state.[place]/living-green/managing-unwanted-medications         Primary Care Provider Office Phone # Fax #     Chely Laguerre -[phone]       [address] [facility] [address]  [facility] [address]        Equal Access to Services     GETACHEW MICHEL : Hadii aad ku hadmamadoucharley Saavedra, imanipaolo ramierzcarisaha, madina giuliaelidia acevedo, aure renardin hayaatia hramonileana suárez jose bang. So [facility] [phone].    ATENCIÓN: Si habla español, tiene a bhagat disposición servicios gratuitos de asistencia lingüística. Llame al [phone].    We comply with applicable federal civil rights laws and [place] laws. We do not discriminate on the basis of race, color, national origin, age, disability, sex, sexual orientation, or gender identity.            Thank you!     Thank you for choosing [facility]  for your care. Our goal is always to provide you with excellent care. Hearing back from our patients is one way we can continue to improve our services. Please take a few minutes to complete the written survey that you may receive in the mail after your visit with us. Thank you!             Your Updated Medication List - Protect others around you: Learn how to safely use, store and throw away your medicines at www.disposemymeds.org.          This list is accurate as of 9/25/18  4:12 PM.  Always use your most recent med list.                   Brand Name Dispense Instructions for use Diagnosis    albuterol 108 (90 Base) MCG/ACT inhaler    PROAIR HFA/PROVENTIL HFA/VENTOLIN HFA     Inhale 2 puffs into the lungs every 4 hours as needed for shortness of breath / dyspnea or wheezing        azithromycin 250 MG tablet    ZITHROMAX    6 tablet    Two tablets first day, then one tablet daily for four days.    COPD exacerbation (H)       gabapentin 100 MG capsule    NEURONTIN    90 capsule    Take 1-2 capsules (100-200 mg) by mouth 3 times daily    Acute midline thoracic back pain       ipratropium - albuterol 0.5 mg/2.5 mg/3 mL 0.5-2.5 (3) MG/3ML neb solution    DUONEB    360 mL    Take 1 vial (3 mLs) by nebulization 4 times daily     Chronic obstructive pulmonary disease, unspecified COPD type (H)       loratadine 10 MG tablet    CLARITIN    30 tablet    Take 1 tablet (10 mg) by mouth daily    Chronic obstructive pulmonary disease, unspecified COPD type (H), Acute seasonal allergic rhinitis, unspecified trigger       oxyCODONE IR 5 MG tablet    ROXICODONE    120 tablet    Take 1 tablet (5 mg) by mouth every 4 hours as needed for pain    HCC (hepatocellular carcinoma) (H), Acute midline thoracic back pain       thiamine 100 MG tablet     30 tablet    Take 1 tablet (100 mg) by mouth daily    Alcohol abuse

## (undated) RX ORDER — SODIUM CHLORIDE 9 MG/ML
INJECTION, SOLUTION INTRAVENOUS
Status: DISPENSED
Start: 2018-01-01

## (undated) RX ORDER — LIDOCAINE HYDROCHLORIDE 10 MG/ML
INJECTION, SOLUTION EPIDURAL; INFILTRATION; INTRACAUDAL; PERINEURAL
Status: DISPENSED
Start: 2018-01-01

## (undated) RX ORDER — FENTANYL CITRATE 50 UG/ML
INJECTION, SOLUTION INTRAMUSCULAR; INTRAVENOUS
Status: DISPENSED
Start: 2018-01-01

## (undated) RX ORDER — DOBUTAMINE HYDROCHLORIDE 200 MG/100ML
INJECTION INTRAVENOUS
Status: DISPENSED
Start: 2018-01-01

## (undated) RX ORDER — METOPROLOL TARTRATE 1 MG/ML
INJECTION, SOLUTION INTRAVENOUS
Status: DISPENSED
Start: 2018-01-01

## (undated) RX ORDER — NITROGLYCERIN 5 MG/ML
VIAL (ML) INTRAVENOUS
Status: DISPENSED
Start: 2018-01-01

## (undated) RX ORDER — HYDROMORPHONE HYDROCHLORIDE 1 MG/ML
INJECTION, SOLUTION INTRAMUSCULAR; INTRAVENOUS; SUBCUTANEOUS
Status: DISPENSED
Start: 2018-01-01

## (undated) RX ORDER — ALBUTEROL SULFATE 0.83 MG/ML
SOLUTION RESPIRATORY (INHALATION)
Status: DISPENSED
Start: 2018-01-01